# Patient Record
Sex: MALE | Race: WHITE | HISPANIC OR LATINO
[De-identification: names, ages, dates, MRNs, and addresses within clinical notes are randomized per-mention and may not be internally consistent; named-entity substitution may affect disease eponyms.]

---

## 2017-01-09 ENCOUNTER — APPOINTMENT (OUTPATIENT)
Age: 62
End: 2017-01-09

## 2017-01-09 VITALS
HEART RATE: 88 BPM | DIASTOLIC BLOOD PRESSURE: 90 MMHG | HEIGHT: 66 IN | TEMPERATURE: 98.1 F | SYSTOLIC BLOOD PRESSURE: 174 MMHG | RESPIRATION RATE: 18 BRPM | BODY MASS INDEX: 30.37 KG/M2 | WEIGHT: 189 LBS

## 2017-04-09 ENCOUNTER — FORM ENCOUNTER (OUTPATIENT)
Age: 62
End: 2017-04-09

## 2017-04-10 ENCOUNTER — OUTPATIENT (OUTPATIENT)
Dept: OUTPATIENT SERVICES | Facility: HOSPITAL | Age: 62
LOS: 1 days | End: 2017-04-10
Payer: COMMERCIAL

## 2017-04-10 ENCOUNTER — APPOINTMENT (OUTPATIENT)
Dept: ULTRASOUND IMAGING | Facility: CLINIC | Age: 62
End: 2017-04-10

## 2017-04-10 DIAGNOSIS — Z98.89 OTHER SPECIFIED POSTPROCEDURAL STATES: Chronic | ICD-10-CM

## 2017-04-10 DIAGNOSIS — B18.2 CHRONIC VIRAL HEPATITIS C: ICD-10-CM

## 2017-04-10 PROCEDURE — 76700 US EXAM ABDOM COMPLETE: CPT

## 2017-05-11 ENCOUNTER — APPOINTMENT (OUTPATIENT)
Age: 62
End: 2017-05-11

## 2017-05-11 VITALS
RESPIRATION RATE: 17 BRPM | SYSTOLIC BLOOD PRESSURE: 150 MMHG | HEART RATE: 80 BPM | WEIGHT: 190 LBS | BODY MASS INDEX: 30.53 KG/M2 | DIASTOLIC BLOOD PRESSURE: 82 MMHG | TEMPERATURE: 97.7 F | HEIGHT: 66 IN

## 2017-11-16 ENCOUNTER — FORM ENCOUNTER (OUTPATIENT)
Age: 62
End: 2017-11-16

## 2017-11-17 ENCOUNTER — APPOINTMENT (OUTPATIENT)
Dept: ULTRASOUND IMAGING | Facility: CLINIC | Age: 62
End: 2017-11-17
Payer: COMMERCIAL

## 2017-11-17 ENCOUNTER — OUTPATIENT (OUTPATIENT)
Dept: OUTPATIENT SERVICES | Facility: HOSPITAL | Age: 62
LOS: 1 days | End: 2017-11-17
Payer: COMMERCIAL

## 2017-11-17 DIAGNOSIS — Z98.89 OTHER SPECIFIED POSTPROCEDURAL STATES: Chronic | ICD-10-CM

## 2017-11-17 DIAGNOSIS — B18.2 CHRONIC VIRAL HEPATITIS C: ICD-10-CM

## 2017-11-17 PROCEDURE — 76700 US EXAM ABDOM COMPLETE: CPT | Mod: 26

## 2017-11-17 PROCEDURE — 76700 US EXAM ABDOM COMPLETE: CPT

## 2017-12-07 ENCOUNTER — APPOINTMENT (OUTPATIENT)
Age: 62
End: 2017-12-07
Payer: COMMERCIAL

## 2017-12-07 LAB
IRON SATN MFR SERPL: 31 %
IRON SERPL-MCNC: 75 UG/DL
TIBC SERPL-MCNC: 243 UG/DL
UIBC SERPL-MCNC: 168 UG/DL

## 2017-12-07 PROCEDURE — 99214 OFFICE O/P EST MOD 30 MIN: CPT

## 2017-12-08 LAB — FERRITIN SERPL-MCNC: 156 NG/ML

## 2017-12-12 LAB — HFE GENE MUT ANL BLD/T: NORMAL

## 2018-01-10 ENCOUNTER — APPOINTMENT (OUTPATIENT)
Age: 63
End: 2018-01-10
Payer: COMMERCIAL

## 2018-01-10 PROCEDURE — 91200 LIVER ELASTOGRAPHY: CPT

## 2018-01-12 LAB
BASOPHILS # BLD AUTO: 0.03 K/UL
BASOPHILS NFR BLD AUTO: 0.4 %
EOSINOPHIL # BLD AUTO: 0.46 K/UL
EOSINOPHIL NFR BLD AUTO: 6.8 %
HCT VFR BLD CALC: 42.9 %
HGB BLD-MCNC: 14.8 G/DL
IMM GRANULOCYTES NFR BLD AUTO: 0.1 %
LYMPHOCYTES # BLD AUTO: 1.51 K/UL
LYMPHOCYTES NFR BLD AUTO: 22.4 %
MAN DIFF?: NORMAL
MCHC RBC-ENTMCNC: 30.5 PG
MCHC RBC-ENTMCNC: 34.5 GM/DL
MCV RBC AUTO: 88.5 FL
MONOCYTES # BLD AUTO: 1.16 K/UL
MONOCYTES NFR BLD AUTO: 17.2 %
NEUTROPHILS # BLD AUTO: 3.57 K/UL
NEUTROPHILS NFR BLD AUTO: 53.1 %
PLATELET # BLD AUTO: 257 K/UL
RBC # BLD: 4.85 M/UL
RBC # FLD: 13.6 %
WBC # FLD AUTO: 6.74 K/UL

## 2018-01-16 LAB
ALBUMIN SERPL ELPH-MCNC: 4.2 G/DL
ALP BLD-CCNC: 71 U/L
ALT SERPL-CCNC: 14 U/L
ANION GAP SERPL CALC-SCNC: 22 MMOL/L
AST SERPL-CCNC: 23 U/L
BILIRUB SERPL-MCNC: 0.9 MG/DL
BUN SERPL-MCNC: 18 MG/DL
CALCIUM SERPL-MCNC: 10.1 MG/DL
CHLORIDE SERPL-SCNC: 103 MMOL/L
CO2 SERPL-SCNC: 19 MMOL/L
CREAT SERPL-MCNC: 1.21 MG/DL
GLUCOSE SERPL-MCNC: 199 MG/DL
INR PPP: 1.16 RATIO
POTASSIUM SERPL-SCNC: 3.8 MMOL/L
PROT SERPL-MCNC: 8.1 G/DL
PT BLD: 13.1 SEC
SODIUM SERPL-SCNC: 144 MMOL/L

## 2018-01-22 ENCOUNTER — RESULT REVIEW (OUTPATIENT)
Age: 63
End: 2018-01-22

## 2018-01-22 ENCOUNTER — OUTPATIENT (OUTPATIENT)
Dept: OUTPATIENT SERVICES | Facility: HOSPITAL | Age: 63
LOS: 1 days | End: 2018-01-22
Payer: COMMERCIAL

## 2018-01-22 ENCOUNTER — APPOINTMENT (OUTPATIENT)
Dept: ULTRASOUND IMAGING | Facility: IMAGING CENTER | Age: 63
End: 2018-01-22
Payer: COMMERCIAL

## 2018-01-22 DIAGNOSIS — Z98.89 OTHER SPECIFIED POSTPROCEDURAL STATES: Chronic | ICD-10-CM

## 2018-01-22 DIAGNOSIS — E83.119 HEMOCHROMATOSIS, UNSPECIFIED: ICD-10-CM

## 2018-01-22 DIAGNOSIS — K74.60 UNSPECIFIED CIRRHOSIS OF LIVER: ICD-10-CM

## 2018-01-22 DIAGNOSIS — K76.9 LIVER DISEASE, UNSPECIFIED: ICD-10-CM

## 2018-01-22 PROCEDURE — 47000 NEEDLE BIOPSY OF LIVER PERQ: CPT

## 2018-01-22 PROCEDURE — 76942 ECHO GUIDE FOR BIOPSY: CPT | Mod: 26

## 2018-01-22 PROCEDURE — 88313 SPECIAL STAINS GROUP 2: CPT

## 2018-01-22 PROCEDURE — 76942 ECHO GUIDE FOR BIOPSY: CPT

## 2018-01-22 PROCEDURE — 88307 TISSUE EXAM BY PATHOLOGIST: CPT

## 2018-01-22 PROCEDURE — 88313 SPECIAL STAINS GROUP 2: CPT | Mod: 26

## 2018-01-22 PROCEDURE — 88307 TISSUE EXAM BY PATHOLOGIST: CPT | Mod: 26

## 2018-03-15 ENCOUNTER — FORM ENCOUNTER (OUTPATIENT)
Age: 63
End: 2018-03-15

## 2018-03-16 ENCOUNTER — APPOINTMENT (OUTPATIENT)
Dept: ULTRASOUND IMAGING | Facility: CLINIC | Age: 63
End: 2018-03-16
Payer: COMMERCIAL

## 2018-03-16 ENCOUNTER — OUTPATIENT (OUTPATIENT)
Dept: OUTPATIENT SERVICES | Facility: HOSPITAL | Age: 63
LOS: 1 days | End: 2018-03-16
Payer: COMMERCIAL

## 2018-03-16 DIAGNOSIS — Z98.89 OTHER SPECIFIED POSTPROCEDURAL STATES: Chronic | ICD-10-CM

## 2018-03-16 DIAGNOSIS — K76.9 LIVER DISEASE, UNSPECIFIED: ICD-10-CM

## 2018-03-16 DIAGNOSIS — K74.60 UNSPECIFIED CIRRHOSIS OF LIVER: ICD-10-CM

## 2018-03-16 PROCEDURE — 76700 US EXAM ABDOM COMPLETE: CPT

## 2018-03-16 PROCEDURE — 76700 US EXAM ABDOM COMPLETE: CPT | Mod: 26

## 2018-06-14 ENCOUNTER — APPOINTMENT (OUTPATIENT)
Dept: HEPATOLOGY | Facility: CLINIC | Age: 63
End: 2018-06-14
Payer: COMMERCIAL

## 2018-06-14 VITALS
WEIGHT: 160 LBS | OXYGEN SATURATION: 99 % | RESPIRATION RATE: 17 BRPM | BODY MASS INDEX: 25.71 KG/M2 | HEIGHT: 66 IN | DIASTOLIC BLOOD PRESSURE: 92 MMHG | HEART RATE: 77 BPM | SYSTOLIC BLOOD PRESSURE: 184 MMHG | TEMPERATURE: 98.3 F

## 2018-06-14 DIAGNOSIS — Z86.39 PERSONAL HISTORY OF OTHER ENDOCRINE, NUTRITIONAL AND METABOLIC DISEASE: ICD-10-CM

## 2018-06-14 PROCEDURE — 99214 OFFICE O/P EST MOD 30 MIN: CPT

## 2018-06-14 RX ORDER — METOPROLOL SUCCINATE 100 MG/1
100 TABLET, EXTENDED RELEASE ORAL
Qty: 90 | Refills: 0 | Status: ACTIVE | COMMUNITY
Start: 2017-12-05

## 2018-06-14 RX ORDER — BLOOD SUGAR DIAGNOSTIC
STRIP MISCELLANEOUS
Qty: 300 | Refills: 0 | Status: ACTIVE | COMMUNITY
Start: 2018-04-01

## 2018-06-14 RX ORDER — LISINOPRIL 2.5 MG/1
2.5 TABLET ORAL
Qty: 30 | Refills: 0 | Status: DISCONTINUED | COMMUNITY
Start: 2018-06-07 | End: 2018-06-14

## 2018-06-14 RX ORDER — LANCETS 33 GAUGE
EACH MISCELLANEOUS
Qty: 300 | Refills: 0 | Status: ACTIVE | COMMUNITY
Start: 2017-07-07

## 2018-06-14 RX ORDER — HYDROCODONE BITARTRATE AND ACETAMINOPHEN 10; 325 MG/1; MG/1
10-325 TABLET ORAL
Qty: 180 | Refills: 0 | Status: ACTIVE | COMMUNITY
Start: 2018-01-11

## 2018-06-14 RX ORDER — AMOXICILLIN 500 MG/1
500 CAPSULE ORAL
Qty: 21 | Refills: 0 | Status: DISCONTINUED | COMMUNITY
Start: 2018-04-06 | End: 2018-06-14

## 2018-11-28 ENCOUNTER — FORM ENCOUNTER (OUTPATIENT)
Age: 63
End: 2018-11-28

## 2018-11-29 ENCOUNTER — OUTPATIENT (OUTPATIENT)
Dept: OUTPATIENT SERVICES | Facility: HOSPITAL | Age: 63
LOS: 1 days | End: 2018-11-29
Payer: COMMERCIAL

## 2018-11-29 ENCOUNTER — APPOINTMENT (OUTPATIENT)
Dept: RADIOLOGY | Facility: CLINIC | Age: 63
End: 2018-11-29
Payer: COMMERCIAL

## 2018-11-29 ENCOUNTER — APPOINTMENT (OUTPATIENT)
Dept: MRI IMAGING | Facility: CLINIC | Age: 63
End: 2018-11-29
Payer: COMMERCIAL

## 2018-11-29 DIAGNOSIS — Z98.89 OTHER SPECIFIED POSTPROCEDURAL STATES: Chronic | ICD-10-CM

## 2018-11-29 DIAGNOSIS — K74.60 UNSPECIFIED CIRRHOSIS OF LIVER: ICD-10-CM

## 2018-11-29 PROCEDURE — 73030 X-RAY EXAM OF SHOULDER: CPT

## 2018-11-29 PROCEDURE — 72156 MRI NECK SPINE W/O & W/DYE: CPT | Mod: 26

## 2018-11-29 PROCEDURE — 74183 MRI ABD W/O CNTR FLWD CNTR: CPT | Mod: 26

## 2018-11-29 PROCEDURE — 73221 MRI JOINT UPR EXTREM W/O DYE: CPT | Mod: 26,RT

## 2018-11-29 PROCEDURE — 73030 X-RAY EXAM OF SHOULDER: CPT | Mod: 26,RT

## 2018-11-29 PROCEDURE — A9585: CPT

## 2018-11-29 PROCEDURE — 72156 MRI NECK SPINE W/O & W/DYE: CPT

## 2018-11-29 PROCEDURE — 74183 MRI ABD W/O CNTR FLWD CNTR: CPT

## 2018-11-29 PROCEDURE — 73221 MRI JOINT UPR EXTREM W/O DYE: CPT

## 2019-02-14 ENCOUNTER — APPOINTMENT (OUTPATIENT)
Dept: HEPATOLOGY | Facility: CLINIC | Age: 64
End: 2019-02-14
Payer: COMMERCIAL

## 2019-02-14 VITALS
WEIGHT: 180 LBS | HEIGHT: 65 IN | SYSTOLIC BLOOD PRESSURE: 172 MMHG | DIASTOLIC BLOOD PRESSURE: 87 MMHG | TEMPERATURE: 97.9 F | BODY MASS INDEX: 29.99 KG/M2 | RESPIRATION RATE: 16 BRPM | HEART RATE: 94 BPM

## 2019-02-14 PROCEDURE — 99214 OFFICE O/P EST MOD 30 MIN: CPT

## 2019-06-25 ENCOUNTER — FORM ENCOUNTER (OUTPATIENT)
Age: 64
End: 2019-06-25

## 2019-06-26 ENCOUNTER — APPOINTMENT (OUTPATIENT)
Dept: ULTRASOUND IMAGING | Facility: CLINIC | Age: 64
End: 2019-06-26
Payer: COMMERCIAL

## 2019-06-26 ENCOUNTER — OUTPATIENT (OUTPATIENT)
Dept: OUTPATIENT SERVICES | Facility: HOSPITAL | Age: 64
LOS: 1 days | End: 2019-06-26

## 2019-06-26 DIAGNOSIS — K74.60 UNSPECIFIED CIRRHOSIS OF LIVER: ICD-10-CM

## 2019-06-26 DIAGNOSIS — Z98.89 OTHER SPECIFIED POSTPROCEDURAL STATES: Chronic | ICD-10-CM

## 2019-06-26 PROCEDURE — 76700 US EXAM ABDOM COMPLETE: CPT | Mod: 26

## 2019-07-05 ENCOUNTER — APPOINTMENT (OUTPATIENT)
Dept: CARDIOLOGY | Facility: CLINIC | Age: 64
End: 2019-07-05
Payer: COMMERCIAL

## 2019-07-05 ENCOUNTER — OTHER (OUTPATIENT)
Age: 64
End: 2019-07-05

## 2019-07-05 ENCOUNTER — NON-APPOINTMENT (OUTPATIENT)
Age: 64
End: 2019-07-05

## 2019-07-05 VITALS
HEART RATE: 80 BPM | WEIGHT: 187 LBS | DIASTOLIC BLOOD PRESSURE: 72 MMHG | HEIGHT: 66 IN | OXYGEN SATURATION: 99 % | SYSTOLIC BLOOD PRESSURE: 160 MMHG | BODY MASS INDEX: 30.05 KG/M2

## 2019-07-05 PROCEDURE — 99244 OFF/OP CNSLTJ NEW/EST MOD 40: CPT

## 2019-07-05 PROCEDURE — 93000 ELECTROCARDIOGRAM COMPLETE: CPT

## 2019-08-15 ENCOUNTER — APPOINTMENT (OUTPATIENT)
Dept: HEPATOLOGY | Facility: CLINIC | Age: 64
End: 2019-08-15

## 2019-08-19 ENCOUNTER — APPOINTMENT (OUTPATIENT)
Dept: CARDIOLOGY | Facility: CLINIC | Age: 64
End: 2019-08-19
Payer: COMMERCIAL

## 2019-08-19 PROCEDURE — 93306 TTE W/DOPPLER COMPLETE: CPT

## 2019-08-21 ENCOUNTER — APPOINTMENT (OUTPATIENT)
Dept: HEPATOLOGY | Facility: CLINIC | Age: 64
End: 2019-08-21
Payer: COMMERCIAL

## 2019-08-21 VITALS
HEIGHT: 66 IN | SYSTOLIC BLOOD PRESSURE: 144 MMHG | TEMPERATURE: 98.1 F | RESPIRATION RATE: 16 BRPM | DIASTOLIC BLOOD PRESSURE: 72 MMHG | HEART RATE: 81 BPM

## 2019-08-21 VITALS — BODY MASS INDEX: 29.7 KG/M2 | WEIGHT: 184 LBS

## 2019-08-21 PROCEDURE — 99214 OFFICE O/P EST MOD 30 MIN: CPT

## 2019-08-21 NOTE — HISTORY OF PRESENT ILLNESS
[de-identified] : Mr. Palmer comes in today for followup visit. He has cirrhosis secondary to hepatitis C. He was treated for hepatitis C  in 2013 and is a sustained virologic responder.  He reports feeling well. He has no abdominal pain, nausea, vomiting, jaundice or pruritus. \par \par Liver biopsy in 2006 shows Stage 3-4 disease. He reports that he has hemochromatosis and was diagnosed in 2005 and has been seeing his gastroenterologist Dr. Nunn for periodic phlebotomies as needed. He had a repeat liver biopsy performed January 22, 2018 revealed F3/4 disease without steatosis and no iron. HII is 0.1\par \par Fibroscan test done 1/21/15 was consistent with F3\par Fibroscan test from 1/10/18 showed F2, S1\par \par Blood tests from 7/9/19 ALT 21, AST 20, Tbil 0.7, Albumin 4.7, CR 1.02, plts 312,000\par Abdo US from 6/26/19 showed no hepatic lesions, no ascites\par \par Blood test June 20, 2018 ALT 27, AST 30, platelet count 291,000. MRI November 29, 2018 shows 0.6 cm lesion in segment to unchanged since 2009 and possibility of early cirrhosis\par An abdominal sonogram March 16, 2018 shows no lesions in the liver. The common bile duct measures 9 mm\par \par An abdominal sonogram November 17, 2017 without lesions in the liver. Blood test November 25, 2017 ALT 18, AST 19, alkaline phosphatase 68, creatinine 1.1, CBC with platelets unremarkable, alpha-fetoprotein 0.8\par \par Abdominal sonogram of April 10, 2017 showed no lesions in the liver. Blood test May 1, 2017 ALT 16, AST 21, CBC with platelets unremarkable, INR 1.1, HCV RNA not detected\par \par An abdominal sonogram November 28, 2016 shows no lesions in the liver\par \par Sonogram January 11, 2016 showed no lesions in the liver. Blood tests January 6, 2016 platelet count 271,000, INR 1.14, iron saturation was 38%, ALT 22, creatinine 1.15, ferritin 124, alpha-fetoprotein 1.6, HCV RNA not detected\par \par Abdominal MRI from January 14, 2015 shows indeterminate finding in the left hepatic lobe measuring 0.3 cm with heterogeneous micronodular enhancement pattern in the arterial phase. Repeat MRI on April 6, 2015 shows stable subcentimeter lesion and is indeterminate.\par \par  He had a CAT scan done by Dr. Nunn on June 27, 2013 revealing no hepatic lesions. Incidental finding of small pulmonary nodule in left lower lobe. \par \par blood tests from April 30, 2015 ALT 27, AST 24, AFP 0.8, HCV RNA not detected.\par \par  blood tests from January 5, 2015 platelets 248,000, AFP 0.8, ALT 27, AST 26.\par \par Blood tests from January 2, 2014 serum glucose 399, platelets 229,000, ALT 23, AST 14, total bilirubin 0.5, HCV RNA not detected.\par \par abdominal sonogram from March 19, 2014 shows no hepatic lesions, slight dilatation of the common bile duct.\par \par

## 2019-08-21 NOTE — ASSESSMENT
[FreeTextEntry1] : 64-year-old male with  history of hepatitis C S/P hepatitis C treatment on 1/27/13. He is a sustained virologic responder. Liver biopsy in 2018 shows Stage 3-4 disease\par \par I explained the meaning of sustained virological response. I explained the that hepatitis C antibody will be present for life. I explained that the patient will not be able to donate blood because of the positive antibody. I have explained that the antibody is not protective and that hepatitis C infection can be acquired again if  exposed. I reviewed the risk factors for acquiring hepatitis C. \par \par I discussed the meaning of cirrhosis with the patient. I reviewed the natural history of the disease. I explained the risks for the development of esophageal varices with and without bleeding, hepatic encephalopathy, ascites, hepatocellular carcinoma, and liver failure. I have explained that disease can progress to the point of requiring an evaluation for liver transplantation. I have explained the need for imaging every 6 months to screen for liver cancer.\par \par  I would like to see him back in 6 months with repeat laboratory tests and sonogram. He will repeat fibroscan test at next follow up visit. \par \par

## 2019-08-21 NOTE — CONSULT LETTER
[Courtesy Letter:] : I had the pleasure of seeing your patient, [unfilled], in my office today. [Dear  ___] : Dear  [unfilled], [Please see my note below.] : Please see my note below. [Consult Closing:] : Thank you very much for allowing me to participate in the care of this patient.  If you have any questions, please do not hesitate to contact me. [Sincerely,] : Sincerely, [Chief, Division of Hepatology] : Chief, Division of Hepatology [Karlo Nelson MD, FACP, ROBERTA, GUILLE, ANAHY] : Karlo Nelson MD, FACP, ROBERTA, GUILLE, ANAHY [[facility]] : [facility] [Professor of Medicine] : Professor of Medicine

## 2019-08-23 ENCOUNTER — APPOINTMENT (OUTPATIENT)
Dept: CARDIOLOGY | Facility: CLINIC | Age: 64
End: 2019-08-23
Payer: COMMERCIAL

## 2019-08-23 VITALS
DIASTOLIC BLOOD PRESSURE: 80 MMHG | WEIGHT: 184 LBS | HEIGHT: 66 IN | BODY MASS INDEX: 29.57 KG/M2 | HEART RATE: 81 BPM | SYSTOLIC BLOOD PRESSURE: 140 MMHG | OXYGEN SATURATION: 97 %

## 2019-08-23 PROCEDURE — 99214 OFFICE O/P EST MOD 30 MIN: CPT

## 2019-09-09 ENCOUNTER — APPOINTMENT (OUTPATIENT)
Dept: ULTRASOUND IMAGING | Facility: CLINIC | Age: 64
End: 2019-09-09
Payer: COMMERCIAL

## 2019-09-09 PROCEDURE — 76536 US EXAM OF HEAD AND NECK: CPT

## 2019-09-10 ENCOUNTER — OUTPATIENT (OUTPATIENT)
Dept: OUTPATIENT SERVICES | Facility: HOSPITAL | Age: 64
LOS: 1 days | End: 2019-09-10
Payer: COMMERCIAL

## 2019-09-10 DIAGNOSIS — Z98.89 OTHER SPECIFIED POSTPROCEDURAL STATES: Chronic | ICD-10-CM

## 2019-09-10 PROCEDURE — 93320 DOPPLER ECHO COMPLETE: CPT | Mod: 26

## 2019-09-10 PROCEDURE — 93312 ECHO TRANSESOPHAGEAL: CPT | Mod: 26

## 2019-09-23 ENCOUNTER — APPOINTMENT (OUTPATIENT)
Dept: CARDIOLOGY | Facility: CLINIC | Age: 64
End: 2019-09-23
Payer: COMMERCIAL

## 2019-09-23 VITALS
WEIGHT: 188 LBS | HEIGHT: 66 IN | SYSTOLIC BLOOD PRESSURE: 150 MMHG | BODY MASS INDEX: 30.22 KG/M2 | HEART RATE: 76 BPM | OXYGEN SATURATION: 97 % | DIASTOLIC BLOOD PRESSURE: 86 MMHG

## 2019-09-23 PROCEDURE — 93000 ELECTROCARDIOGRAM COMPLETE: CPT

## 2019-09-23 PROCEDURE — 99214 OFFICE O/P EST MOD 30 MIN: CPT | Mod: 25

## 2019-10-11 ENCOUNTER — APPOINTMENT (OUTPATIENT)
Dept: MRI IMAGING | Facility: CLINIC | Age: 64
End: 2019-10-11
Payer: COMMERCIAL

## 2019-10-11 PROCEDURE — 72156 MRI NECK SPINE W/O & W/DYE: CPT

## 2019-10-11 PROCEDURE — A9585A: CUSTOM

## 2019-10-11 PROCEDURE — A9585: CPT | Mod: JW

## 2019-11-18 ENCOUNTER — APPOINTMENT (OUTPATIENT)
Dept: MRI IMAGING | Facility: CLINIC | Age: 64
End: 2019-11-18
Payer: COMMERCIAL

## 2019-11-18 PROCEDURE — 73221 MRI JOINT UPR EXTREM W/O DYE: CPT | Mod: RT

## 2019-11-18 PROCEDURE — 72141 MRI NECK SPINE W/O DYE: CPT

## 2019-12-20 ENCOUNTER — APPOINTMENT (OUTPATIENT)
Dept: CARDIOLOGY | Facility: CLINIC | Age: 64
End: 2019-12-20
Payer: COMMERCIAL

## 2019-12-20 PROCEDURE — 93306 TTE W/DOPPLER COMPLETE: CPT

## 2019-12-23 ENCOUNTER — APPOINTMENT (OUTPATIENT)
Dept: CARDIOLOGY | Facility: CLINIC | Age: 64
End: 2019-12-23
Payer: COMMERCIAL

## 2019-12-23 VITALS
BODY MASS INDEX: 30.22 KG/M2 | HEART RATE: 85 BPM | WEIGHT: 188 LBS | HEIGHT: 66 IN | DIASTOLIC BLOOD PRESSURE: 80 MMHG | SYSTOLIC BLOOD PRESSURE: 138 MMHG | OXYGEN SATURATION: 96 %

## 2019-12-23 PROCEDURE — 99214 OFFICE O/P EST MOD 30 MIN: CPT

## 2020-01-21 ENCOUNTER — APPOINTMENT (OUTPATIENT)
Dept: ULTRASOUND IMAGING | Facility: CLINIC | Age: 65
End: 2020-01-21
Payer: COMMERCIAL

## 2020-01-21 PROCEDURE — 76700 US EXAM ABDOM COMPLETE: CPT

## 2020-01-24 ENCOUNTER — OTHER (OUTPATIENT)
Age: 65
End: 2020-01-24

## 2020-02-12 ENCOUNTER — APPOINTMENT (OUTPATIENT)
Dept: HEPATOLOGY | Facility: CLINIC | Age: 65
End: 2020-02-12

## 2020-02-27 NOTE — PHYSICAL EXAM
abnormal lab result [General Appearance - In No Acute Distress] : in no acute distress [Sclera] : the sclera and conjunctiva were normal [General Appearance - Alert] : alert [Neck Appearance] : the appearance of the neck was normal [Neck Cervical Mass (___cm)] : no neck mass was observed [Jugular Venous Distention Increased] : there was no jugular-venous distention [Thyroid Nodule] : there were no palpable thyroid nodules [Thyroid Diffuse Enlargement] : the thyroid was not enlarged [Heart Rate And Rhythm] : heart rate was normal and rhythm regular [Auscultation Breath Sounds / Voice Sounds] : lungs were clear to auscultation bilaterally [Heart Sounds] : normal S1 and S2 [Full Pulse] : the pedal pulses are present [Edema] : there was no peripheral edema [Bowel Sounds] : normal bowel sounds [Abdomen Soft] : soft [Abdomen Tenderness] : non-tender [Abdomen Mass (___ Cm)] : no abdominal mass palpated [No CVA Tenderness] : no ~M costovertebral angle tenderness [] : no hepato-splenomegaly [No Spinal Tenderness] : no spinal tenderness [No Focal Deficits] : no focal deficits [Oriented To Time, Place, And Person] : oriented to person, place, and time [Affect] : the affect was normal [Scleral Icterus] : No Scleral Icterus [Hepatojugular Reflux] : patient did not have a sustained hepatojugular reflux [Spider Angioma] : No spider angioma(s) were observed [Abdominal Bruit] : no abdominal bruit [Abdominal  Ascites] : no ascites [Asterixis] : no asterixis observed [Splenomegaly] : no splenomegaly [Jaundice] : No jaundice [Palmar Erythema] : no Palmar Erythema

## 2020-03-10 ENCOUNTER — APPOINTMENT (OUTPATIENT)
Dept: MRI IMAGING | Facility: CLINIC | Age: 65
End: 2020-03-10
Payer: COMMERCIAL

## 2020-03-10 PROCEDURE — A9585A: CUSTOM

## 2020-03-10 PROCEDURE — 74183 MRI ABD W/O CNTR FLWD CNTR: CPT

## 2020-03-10 PROCEDURE — A9585: CPT | Mod: JW

## 2020-03-25 ENCOUNTER — APPOINTMENT (OUTPATIENT)
Dept: GASTROENTEROLOGY | Facility: CLINIC | Age: 65
End: 2020-03-25
Payer: MEDICARE

## 2020-03-25 ENCOUNTER — APPOINTMENT (OUTPATIENT)
Dept: HEPATOLOGY | Facility: CLINIC | Age: 65
End: 2020-03-25

## 2020-03-25 VITALS
WEIGHT: 190 LBS | HEIGHT: 66 IN | BODY MASS INDEX: 30.53 KG/M2 | DIASTOLIC BLOOD PRESSURE: 93 MMHG | HEART RATE: 93 BPM | SYSTOLIC BLOOD PRESSURE: 173 MMHG | RESPIRATION RATE: 16 BRPM | OXYGEN SATURATION: 98 % | TEMPERATURE: 98.3 F

## 2020-03-25 DIAGNOSIS — K80.50 CALCULUS OF BILE DUCT W/OUT CHOLANGITIS OR CHOLECYSTITIS W/OUT OBSTRUCTION: ICD-10-CM

## 2020-03-25 PROCEDURE — 99203 OFFICE O/P NEW LOW 30 MIN: CPT

## 2020-03-25 NOTE — PHYSICAL EXAM
[General Appearance - Alert] : alert [General Appearance - In No Acute Distress] : in no acute distress [Sclera] : the sclera and conjunctiva were normal [PERRL With Normal Accommodation] : pupils were equal in size, round, and reactive to light [Extraocular Movements] : extraocular movements were intact [Outer Ear] : the ears and nose were normal in appearance [Oropharynx] : the oropharynx was normal [Neck Appearance] : the appearance of the neck was normal [Neck Cervical Mass (___cm)] : no neck mass was observed [Jugular Venous Distention Increased] : there was no jugular-venous distention [Thyroid Diffuse Enlargement] : the thyroid was not enlarged [Thyroid Nodule] : there were no palpable thyroid nodules [Auscultation Breath Sounds / Voice Sounds] : lungs were clear to auscultation bilaterally [Heart Sounds] : normal S1 and S2 [Bowel Sounds] : normal bowel sounds [Abdomen Soft] : soft [Abdomen Tenderness] : non-tender [] : no hepato-splenomegaly [Abdomen Mass (___ Cm)] : no abdominal mass palpated [No CVA Tenderness] : no ~M costovertebral angle tenderness [No Spinal Tenderness] : no spinal tenderness [Oriented To Time, Place, And Person] : oriented to person, place, and time [Impaired Insight] : insight and judgment were intact [Affect] : the affect was normal [FreeTextEntry1] : obese

## 2020-03-25 NOTE — CONSULT LETTER
[Dear  ___] : Dear  [unfilled], [Consult Letter:] : I had the pleasure of evaluating your patient, [unfilled]. [Please see my note below.] : Please see my note below. [Consult Closing:] : Thank you very much for allowing me to participate in the care of this patient.  If you have any questions, please do not hesitate to contact me. [Sincerely,] : Sincerely, [DrEvelyn  ___] : Dr. SUERO [FreeTextEntry3] : Henry Solo MD [DrEvelyn ___] : Dr. SUERO

## 2020-03-25 NOTE — HISTORY OF PRESENT ILLNESS
[de-identified] : This is a 65 year old man with a history of HTN, DM,AS, Hep C cirrhosis who was treated for Hepatitis C and is a sustained virologic responder.  He is a patient of Dr. Nelson and follows up with him regularly. He had a routine ultrasound of the abdomen which showed a dilated CBD to 1 cm. A follow up MRCP showed a distal CBD stone. He has normal liver function tests. He states that he has occasional right upper quadrant pain. It is intermittent and usually mild. It can last hours to days when it occurs but he is not aware of any precipitating factors. He denies fevers, chills, nausea or vomiting currently.  [de-identified] : FINDINGS: \par \par LOWER CHEST: Clear. \par \par LIVER: Posterior nodule of the right lobe again noted. Normal signal. Patent \par hepatic vasculature including portal and hepatic veins. Conventional \par arterial anatomy. No varices. No evidence of hepatocellular carcinoma. \par \par BILE DUCTS: 11 mm common bile duct dilatation with normal distal tapering. 3 \par mm distal CBD calculus. No intrahepatic dilatation. Cystic duct is \par unremarkable. \par \par GALLBLADDER: Normal without stones or thickening. \par SPLEEN: Normal. \par PANCREAS: Normal. \par ADRENALS: Normal. \par KIDNEYS/URETERS: Symmetric nephrograms. No hydronephrosis. \par \par VISUALIZED PORTIONS: \par \par BOWEL: No bowel-related abnormality. \par PERITONEUM: No ascites. \par VESSELS: Normal caliber aorta. \par RETROPERITONEUM/LYMPH NODES: Stable increased signal of the small bowel \par mesentery with associated mild mesenteric lymphadenopathy. \par ABDOMINAL WALL: Normal. \par BONES: No aggressive lesion. \par \par IMPRESSION: \par \par No evidence of hepatocellular carcinoma. \par \par 3 mm distal common bile duct calculus with 11 mm dilatation of the common \par duct.  [de-identified] : FINDINGS: \par \par Liver: Normal echogenicity and echotexture. Smooth surface contour. No \par focal lesion. A it measures 17.2 cm sagittally. The main portal vein is \par patent. \par \par Bile ducts: No intrahepatic biliary ductal dilatation. Common bile duct \par measures 10 mm. \par \par Gallbladder: Within normal limits. \par \par Pancreas: Visualized portions are within normal limits. \par \par Spleen: 9.1 cm. Within normal limits. \par \par Right kidney: 11.7 cm. No hydronephrosis. Normal cortical echogenicity. \par \par Left kidney: 11.5 cm. No hydronephrosis. Normal cortical echogenicity. \par \par Ascites: None. \par \par Aorta and IVC: Visualized portions are within normal limits. \par \par IMPRESSION: \par \par Dilated CBD measuring 1 cm. \par Otherwise unremarkable abdominal ultrasound.

## 2020-03-25 NOTE — ASSESSMENT
[FreeTextEntry1] : This is a 65 year old man with a history of hepatitis C and cirrhosis although his most recent fibroscan shows an improvement in his fibrosis score. He was incidentally found to have a distal CBD stone. I will schedule him for an ERCP. We discussed the risks, benefits and alternatives of the procedure in detail. He will follow up with his cardiologist Dr. Desouza regarding cardiac clearance.

## 2020-03-26 ENCOUNTER — APPOINTMENT (OUTPATIENT)
Dept: CARDIOLOGY | Facility: CLINIC | Age: 65
End: 2020-03-26
Payer: MEDICARE

## 2020-03-26 ENCOUNTER — NON-APPOINTMENT (OUTPATIENT)
Age: 65
End: 2020-03-26

## 2020-03-26 VITALS
OXYGEN SATURATION: 98 % | HEART RATE: 87 BPM | WEIGHT: 190 LBS | HEIGHT: 66 IN | BODY MASS INDEX: 30.53 KG/M2 | DIASTOLIC BLOOD PRESSURE: 86 MMHG | SYSTOLIC BLOOD PRESSURE: 142 MMHG

## 2020-03-26 DIAGNOSIS — Z00.00 ENCOUNTER FOR GENERAL ADULT MEDICAL EXAMINATION W/OUT ABNORMAL FINDINGS: ICD-10-CM

## 2020-03-26 PROCEDURE — 99215 OFFICE O/P EST HI 40 MIN: CPT

## 2020-03-26 PROCEDURE — 93000 ELECTROCARDIOGRAM COMPLETE: CPT

## 2020-03-26 NOTE — REASON FOR VISIT
[Follow-Up - Clinic] : a clinic follow-up of [Aortic Stenosis] : aortic stenosis [Hyperlipidemia] : hyperlipidemia [Hypertension] : hypertension [Medication Management] : Medication management [Family Member] : family member

## 2020-03-26 NOTE — DISCUSSION/SUMMARY
[Patient] : the patient [___ Month(s)] : [unfilled] month(s) [With Me] : with me [Procedure Low Risk] : the procedure risk is low [Patient Low Risk] : the patient is a low surgical risk [Optimized for Surgery] : the patient is optimized for surgery [As per surgery] : as per surgery [Continue] : Continue medications as currently directed [FreeTextEntry1] : 65yr M w/ PMH as above presenting for clearance for ERCP\par Cleared for ERCP. \par \par  He has severe MR but no other echo parameters to warrant surgery at this point.  \par \par 1. Severe MR - asymptomatic, repeat echo in 6 months time to look for change in echo parameters\par 2. HTN - Exforge  mg PO daily, HCTZ 12.5 mg PO daily, toprol 150 mg PO daily \par 3. Continue healthy diet and exercise\par \par

## 2020-03-26 NOTE — PHYSICAL EXAM
[General Appearance - Well Developed] : well developed [Normal Appearance] : normal appearance [Well Groomed] : well groomed [General Appearance - Well Nourished] : well nourished [No Deformities] : no deformities [General Appearance - In No Acute Distress] : no acute distress [Normal Conjunctiva] : the conjunctiva exhibited no abnormalities [Eyelids - No Xanthelasma] : the eyelids demonstrated no xanthelasmas [Normal Oral Mucosa] : normal oral mucosa [No Oral Pallor] : no oral pallor [No Oral Cyanosis] : no oral cyanosis [Normal Jugular Venous A Waves Present] : normal jugular venous A waves present [Normal Jugular Venous V Waves Present] : normal jugular venous V waves present [No Jugular Venous Boo A Waves] : no jugular venous boo A waves [Respiration, Rhythm And Depth] : normal respiratory rhythm and effort [Exaggerated Use Of Accessory Muscles For Inspiration] : no accessory muscle use [Auscultation Breath Sounds / Voice Sounds] : lungs were clear to auscultation bilaterally [Heart Rate And Rhythm] : heart rate and rhythm were normal [Heart Sounds] : normal S1 and S2 [Arterial Pulses Normal] : the arterial pulses were normal [Edema] : no peripheral edema present [Bowel Sounds] : normal bowel sounds [Abdomen Soft] : soft [Abdomen Tenderness] : non-tender [Abdomen Mass (___ Cm)] : no abdominal mass palpated [Abnormal Walk] : normal gait [Gait - Sufficient For Exercise Testing] : the gait was sufficient for exercise testing [Nail Clubbing] : no clubbing of the fingernails [Cyanosis, Localized] : no localized cyanosis [Petechial Hemorrhages (___cm)] : no petechial hemorrhages [Skin Color & Pigmentation] : normal skin color and pigmentation [] : no rash [No Venous Stasis] : no venous stasis [Skin Lesions] : no skin lesions [No Skin Ulcers] : no skin ulcer [No Xanthoma] : no  xanthoma was observed [Oriented To Time, Place, And Person] : oriented to person, place, and time [Affect] : the affect was normal [Mood] : the mood was normal [No Anxiety] : not feeling anxious [FreeTextEntry1] : 2/6 holosystolic murmur at the apex, early peaking

## 2020-03-26 NOTE — HISTORY OF PRESENT ILLNESS
[Preoperative Visit] : for a medical evaluation prior to surgery [Scheduled Procedure ___] : a [unfilled] [Surgeon Name ___] : surgeon: [unfilled] [Good] : Good [Abdominal Pain] : abdominal pain [Diabetes] : diabetes [Cardiovascular Disease] : cardiovascular disease [GI Disease] : gastrointestinal disease [Prior Anesthesia] : Prior anesthesia [Electrocardiogram] : ~T an ECG ~C was performed [Echocardiogram] : ~T an echocardiogram ~C was performed [Fever] : no fever [Chills] : no chills [Fatigue] : no fatigue [Chest Pain] : no chest pain [Cough] : no cough [Dyspnea] : no dyspnea [Dysuria] : no dysuria [Urinary Frequency] : no urinary frequency [Nausea] : no nausea [Vomiting] : no vomiting [Diarrhea] : no diarrhea [Easy Bruising] : no easy bruising [Lower Extremity Swelling] : no lower extremity swelling [Poor Exercise Tolerance] : no poor exercise tolerance [Pulmonary Disease] : no pulmonary disease [Anti-Platelet Agents] : no anti-platelet agents [Nicotine Dependence] : no nicotine dependence [Alcohol Use] : no  alcohol use [Renal Disease] : no renal disease [Sleep Apnea] : no sleep apnea [Thromboembolic Problems] : no thromboembolic problems [Frequent use of NSAIDs] : no use of NSAIDs [Transfusion Reaction] : no transfusion reaction [Impaired Immunity] : no impaired immunity [Steroid Use in Last 6 Months] : no steroid use in the last six months [Frequent Aspirin Use] : no frequent aspirin use [Prev Anesthesia Reaction] : no previous anesthesia reaction [Anesthesia Reaction] : no anesthesia reaction [Sudden Death] : no sudden death [Clotting Disorder] : no clotting disorder [Bleeding Disorder] : no bleeding disorder [FreeTextEntry1] : 65M was seen on 03/26/20 for clearance for ERCP.\par  w/ PMH of HTN, HLD, DM on insulin, HCV s/p treatment, AS with long-term history of murmur  He is active at the gym and is able to use the elipitical and bike for up to an hour at a time without chest pain, SOB or palpitations.  He has previously had a non-invasive workup through [facility] but has switched insurance.  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  LVIDs < 4.0 and EF was 60% by visual estimation. QUINCY revealed severe eccentric MR with a partially flail PMVL. He continues to be asymptomatic and exercise regularly at the gym.\par \par Repeat TTE showed similar EF, MR and pulmonary pressures\par \par he has no chest pain\par He has no shortness of breath\par He has no palpitations\par He has no syncope\par He is neurologically intact\par He has no edema\par He has no GI symptoms\par

## 2020-04-01 ENCOUNTER — APPOINTMENT (OUTPATIENT)
Dept: GASTROENTEROLOGY | Facility: HOSPITAL | Age: 65
End: 2020-04-01

## 2020-04-02 ENCOUNTER — APPOINTMENT (OUTPATIENT)
Dept: DISASTER EMERGENCY | Facility: CLINIC | Age: 65
End: 2020-04-02
Payer: MEDICARE

## 2020-04-02 VITALS — TEMPERATURE: 98.4 F

## 2020-04-02 DIAGNOSIS — R68.89 OTHER GENERAL SYMPTOMS AND SIGNS: ICD-10-CM

## 2020-04-02 DIAGNOSIS — Z20.828 CONTACT WITH AND (SUSPECTED) EXPOSURE TO OTHER VIRAL COMMUNICABLE DISEASES: ICD-10-CM

## 2020-04-02 PROCEDURE — 99202 OFFICE O/P NEW SF 15 MIN: CPT | Mod: CS

## 2020-04-02 NOTE — HISTORY OF PRESENT ILLNESS
[] : no diaphoresis [None Known] : none known [Age >= 60 years] : age >= 60 years [None] : none [Speaks in full sentences] : speaks in full sentences [Grossly normal, interacts, not tired or weak] : grossly normal, interacts, not tired or weak [COVID-19 testing ordered and done] : COVID-19 testing ordered and done [FreeTextEntry1] : LYDIA is a 65 year male who is here for COVID 19 testing.\par Fever broke yesterday afternoon. He is supposed to have gal blader surgery. needs testing to access surgery [TextBox_[id number]] : sinus congestion \par no diarrhea

## 2020-04-07 LAB — SARS-COV-2 N GENE NPH QL NAA+PROBE: NOT DETECTED

## 2020-04-20 ENCOUNTER — OUTPATIENT (OUTPATIENT)
Dept: OUTPATIENT SERVICES | Facility: HOSPITAL | Age: 65
LOS: 1 days | End: 2020-04-20

## 2020-04-20 DIAGNOSIS — Z98.89 OTHER SPECIFIED POSTPROCEDURAL STATES: Chronic | ICD-10-CM

## 2020-04-23 ENCOUNTER — TRANSCRIPTION ENCOUNTER (OUTPATIENT)
Age: 65
End: 2020-04-23

## 2020-04-24 ENCOUNTER — RESULT REVIEW (OUTPATIENT)
Age: 65
End: 2020-04-24

## 2020-04-24 ENCOUNTER — INPATIENT (INPATIENT)
Facility: HOSPITAL | Age: 65
LOS: 1 days | Discharge: HOME | DRG: 393 | End: 2020-04-26
Attending: HOSPITALIST
Payer: MEDICARE

## 2020-04-24 ENCOUNTER — APPOINTMENT (OUTPATIENT)
Dept: GASTROENTEROLOGY | Facility: HOSPITAL | Age: 65
End: 2020-04-24

## 2020-04-24 ENCOUNTER — OUTPATIENT (OUTPATIENT)
Dept: OUTPATIENT SERVICES | Facility: HOSPITAL | Age: 65
LOS: 1 days | End: 2020-04-24
Payer: MEDICARE

## 2020-04-24 VITALS
HEIGHT: 66 IN | RESPIRATION RATE: 20 BRPM | TEMPERATURE: 98 F | OXYGEN SATURATION: 96 % | SYSTOLIC BLOOD PRESSURE: 150 MMHG | DIASTOLIC BLOOD PRESSURE: 76 MMHG | HEART RATE: 83 BPM | WEIGHT: 182.1 LBS

## 2020-04-24 DIAGNOSIS — Z98.89 OTHER SPECIFIED POSTPROCEDURAL STATES: Chronic | ICD-10-CM

## 2020-04-24 DIAGNOSIS — K80.50 CALCULUS OF BILE DUCT WITHOUT CHOLANGITIS OR CHOLECYSTITIS WITHOUT OBSTRUCTION: ICD-10-CM

## 2020-04-24 LAB
ALBUMIN SERPL ELPH-MCNC: 4.5 G/DL — SIGNIFICANT CHANGE UP (ref 3.3–5.2)
ALP SERPL-CCNC: 111 U/L — SIGNIFICANT CHANGE UP (ref 40–120)
ALT FLD-CCNC: 62 U/L — HIGH
ANION GAP SERPL CALC-SCNC: 17 MMOL/L — SIGNIFICANT CHANGE UP (ref 5–17)
AST SERPL-CCNC: 82 U/L — HIGH
BASOPHILS # BLD AUTO: 0.01 K/UL — SIGNIFICANT CHANGE UP (ref 0–0.2)
BASOPHILS NFR BLD AUTO: 0.1 % — SIGNIFICANT CHANGE UP (ref 0–2)
BILIRUB SERPL-MCNC: 2.7 MG/DL — HIGH (ref 0.4–2)
BUN SERPL-MCNC: 18 MG/DL — SIGNIFICANT CHANGE UP (ref 8–20)
CALCIUM SERPL-MCNC: 9.5 MG/DL — SIGNIFICANT CHANGE UP (ref 8.6–10.2)
CHLORIDE SERPL-SCNC: 98 MMOL/L — SIGNIFICANT CHANGE UP (ref 98–107)
CO2 SERPL-SCNC: 23 MMOL/L — SIGNIFICANT CHANGE UP (ref 22–29)
CREAT SERPL-MCNC: 0.87 MG/DL — SIGNIFICANT CHANGE UP (ref 0.5–1.3)
EOSINOPHIL # BLD AUTO: 0 K/UL — SIGNIFICANT CHANGE UP (ref 0–0.5)
EOSINOPHIL NFR BLD AUTO: 0 % — SIGNIFICANT CHANGE UP (ref 0–6)
GLUCOSE BLDC GLUCOMTR-MCNC: 168 MG/DL — HIGH (ref 70–99)
GLUCOSE BLDC GLUCOMTR-MCNC: 274 MG/DL — HIGH (ref 70–99)
GLUCOSE SERPL-MCNC: 311 MG/DL — HIGH (ref 70–99)
HCT VFR BLD CALC: 43.5 % — SIGNIFICANT CHANGE UP (ref 39–50)
HGB BLD-MCNC: 15.5 G/DL — SIGNIFICANT CHANGE UP (ref 13–17)
IMM GRANULOCYTES NFR BLD AUTO: 0.6 % — SIGNIFICANT CHANGE UP (ref 0–1.5)
LACTATE BLDV-MCNC: 1.9 MMOL/L — SIGNIFICANT CHANGE UP (ref 0.5–2)
LIDOCAIN IGE QN: 114 U/L — HIGH (ref 22–51)
LYMPHOCYTES # BLD AUTO: 0.58 K/UL — LOW (ref 1–3.3)
LYMPHOCYTES # BLD AUTO: 7 % — LOW (ref 13–44)
MCHC RBC-ENTMCNC: 30.6 PG — SIGNIFICANT CHANGE UP (ref 27–34)
MCHC RBC-ENTMCNC: 35.6 GM/DL — SIGNIFICANT CHANGE UP (ref 32–36)
MCV RBC AUTO: 86 FL — SIGNIFICANT CHANGE UP (ref 80–100)
MONOCYTES # BLD AUTO: 0.18 K/UL — SIGNIFICANT CHANGE UP (ref 0–0.9)
MONOCYTES NFR BLD AUTO: 2.2 % — SIGNIFICANT CHANGE UP (ref 2–14)
NEUTROPHILS # BLD AUTO: 7.51 K/UL — HIGH (ref 1.8–7.4)
NEUTROPHILS NFR BLD AUTO: 90.1 % — HIGH (ref 43–77)
PLATELET # BLD AUTO: 320 K/UL — SIGNIFICANT CHANGE UP (ref 150–400)
POTASSIUM SERPL-MCNC: 4.1 MMOL/L — SIGNIFICANT CHANGE UP (ref 3.5–5.3)
POTASSIUM SERPL-SCNC: 4.1 MMOL/L — SIGNIFICANT CHANGE UP (ref 3.5–5.3)
PROT SERPL-MCNC: 7.7 G/DL — SIGNIFICANT CHANGE UP (ref 6.6–8.7)
RBC # BLD: 5.06 M/UL — SIGNIFICANT CHANGE UP (ref 4.2–5.8)
RBC # FLD: 13 % — SIGNIFICANT CHANGE UP (ref 10.3–14.5)
SODIUM SERPL-SCNC: 138 MMOL/L — SIGNIFICANT CHANGE UP (ref 135–145)
WBC # BLD: 8.33 K/UL — SIGNIFICANT CHANGE UP (ref 3.8–10.5)
WBC # FLD AUTO: 8.33 K/UL — SIGNIFICANT CHANGE UP (ref 3.8–10.5)

## 2020-04-24 PROCEDURE — 71045 X-RAY EXAM CHEST 1 VIEW: CPT | Mod: 26

## 2020-04-24 PROCEDURE — 74177 CT ABD & PELVIS W/CONTRAST: CPT | Mod: 26

## 2020-04-24 PROCEDURE — C1773: CPT

## 2020-04-24 PROCEDURE — 99218: CPT

## 2020-04-24 PROCEDURE — C1769: CPT

## 2020-04-24 PROCEDURE — 43264 ERCP REMOVE DUCT CALCULI: CPT

## 2020-04-24 PROCEDURE — 74330 X-RAY BILE/PANC ENDOSCOPY: CPT

## 2020-04-24 PROCEDURE — 82962 GLUCOSE BLOOD TEST: CPT

## 2020-04-24 PROCEDURE — 43262 ENDO CHOLANGIOPANCREATOGRAPH: CPT

## 2020-04-24 RX ORDER — METRONIDAZOLE 500 MG
500 TABLET ORAL EVERY 8 HOURS
Refills: 0 | Status: DISCONTINUED | OUTPATIENT
Start: 2020-04-25 | End: 2020-04-26

## 2020-04-24 RX ORDER — DEXTROSE 50 % IN WATER 50 %
25 SYRINGE (ML) INTRAVENOUS ONCE
Refills: 0 | Status: DISCONTINUED | OUTPATIENT
Start: 2020-04-24 | End: 2020-04-26

## 2020-04-24 RX ORDER — GLUCAGON INJECTION, SOLUTION 0.5 MG/.1ML
1 INJECTION, SOLUTION SUBCUTANEOUS ONCE
Refills: 0 | Status: DISCONTINUED | OUTPATIENT
Start: 2020-04-24 | End: 2020-04-26

## 2020-04-24 RX ORDER — METRONIDAZOLE 500 MG
500 TABLET ORAL ONCE
Refills: 0 | Status: COMPLETED | OUTPATIENT
Start: 2020-04-24 | End: 2020-04-24

## 2020-04-24 RX ORDER — VALSARTAN 80 MG/1
320 TABLET ORAL DAILY
Refills: 0 | Status: DISCONTINUED | OUTPATIENT
Start: 2020-04-24 | End: 2020-04-26

## 2020-04-24 RX ORDER — SODIUM CHLORIDE 9 MG/ML
1000 INJECTION, SOLUTION INTRAVENOUS
Refills: 0 | Status: DISCONTINUED | OUTPATIENT
Start: 2020-04-24 | End: 2020-04-26

## 2020-04-24 RX ORDER — AMLODIPINE BESYLATE 2.5 MG/1
10 TABLET ORAL DAILY
Refills: 0 | Status: DISCONTINUED | OUTPATIENT
Start: 2020-04-24 | End: 2020-04-26

## 2020-04-24 RX ORDER — HYDROCHLOROTHIAZIDE 25 MG
12.5 TABLET ORAL DAILY
Refills: 0 | Status: DISCONTINUED | OUTPATIENT
Start: 2020-04-24 | End: 2020-04-26

## 2020-04-24 RX ORDER — SODIUM CHLORIDE 9 MG/ML
1000 INJECTION INTRAMUSCULAR; INTRAVENOUS; SUBCUTANEOUS ONCE
Refills: 0 | Status: COMPLETED | OUTPATIENT
Start: 2020-04-24 | End: 2020-04-24

## 2020-04-24 RX ORDER — DEXTROSE 50 % IN WATER 50 %
12.5 SYRINGE (ML) INTRAVENOUS ONCE
Refills: 0 | Status: DISCONTINUED | OUTPATIENT
Start: 2020-04-24 | End: 2020-04-26

## 2020-04-24 RX ORDER — ACETAMINOPHEN 500 MG
650 TABLET ORAL EVERY 6 HOURS
Refills: 0 | Status: DISCONTINUED | OUTPATIENT
Start: 2020-04-24 | End: 2020-04-26

## 2020-04-24 RX ORDER — DEXTROSE 50 % IN WATER 50 %
15 SYRINGE (ML) INTRAVENOUS ONCE
Refills: 0 | Status: DISCONTINUED | OUTPATIENT
Start: 2020-04-24 | End: 2020-04-26

## 2020-04-24 RX ORDER — MORPHINE SULFATE 50 MG/1
4 CAPSULE, EXTENDED RELEASE ORAL ONCE
Refills: 0 | Status: DISCONTINUED | OUTPATIENT
Start: 2020-04-24 | End: 2020-04-24

## 2020-04-24 RX ORDER — MORPHINE SULFATE 50 MG/1
4 CAPSULE, EXTENDED RELEASE ORAL EVERY 6 HOURS
Refills: 0 | Status: DISCONTINUED | OUTPATIENT
Start: 2020-04-24 | End: 2020-04-26

## 2020-04-24 RX ORDER — INSULIN LISPRO 100/ML
VIAL (ML) SUBCUTANEOUS
Refills: 0 | Status: DISCONTINUED | OUTPATIENT
Start: 2020-04-24 | End: 2020-04-26

## 2020-04-24 RX ORDER — SODIUM CHLORIDE 9 MG/ML
1000 INJECTION INTRAMUSCULAR; INTRAVENOUS; SUBCUTANEOUS
Refills: 0 | Status: DISCONTINUED | OUTPATIENT
Start: 2020-04-24 | End: 2020-04-26

## 2020-04-24 RX ORDER — METRONIDAZOLE 500 MG
TABLET ORAL
Refills: 0 | Status: DISCONTINUED | OUTPATIENT
Start: 2020-04-24 | End: 2020-04-26

## 2020-04-24 RX ORDER — CIPROFLOXACIN LACTATE 400MG/40ML
400 VIAL (ML) INTRAVENOUS EVERY 12 HOURS
Refills: 0 | Status: DISCONTINUED | OUTPATIENT
Start: 2020-04-24 | End: 2020-04-26

## 2020-04-24 RX ORDER — ASPIRIN/CALCIUM CARB/MAGNESIUM 324 MG
81 TABLET ORAL DAILY
Refills: 0 | Status: DISCONTINUED | OUTPATIENT
Start: 2020-04-24 | End: 2020-04-26

## 2020-04-24 RX ORDER — METOPROLOL TARTRATE 50 MG
100 TABLET ORAL DAILY
Refills: 0 | Status: DISCONTINUED | OUTPATIENT
Start: 2020-04-24 | End: 2020-04-26

## 2020-04-24 RX ADMIN — MORPHINE SULFATE 4 MILLIGRAM(S): 50 CAPSULE, EXTENDED RELEASE ORAL at 22:51

## 2020-04-24 RX ADMIN — Medication 400 MILLIGRAM(S): at 22:20

## 2020-04-24 RX ADMIN — SODIUM CHLORIDE 125 MILLILITER(S): 9 INJECTION INTRAMUSCULAR; INTRAVENOUS; SUBCUTANEOUS at 22:50

## 2020-04-24 RX ADMIN — MORPHINE SULFATE 4 MILLIGRAM(S): 50 CAPSULE, EXTENDED RELEASE ORAL at 17:59

## 2020-04-24 RX ADMIN — Medication 200 MILLIGRAM(S): at 21:20

## 2020-04-24 RX ADMIN — Medication 100 MILLIGRAM(S): at 22:50

## 2020-04-24 RX ADMIN — SODIUM CHLORIDE 1000 MILLILITER(S): 9 INJECTION INTRAMUSCULAR; INTRAVENOUS; SUBCUTANEOUS at 17:52

## 2020-04-24 NOTE — ED CLERICAL - NS ED CLERK NOTE PRE-ARRIVAL INFORMATION; ADDITIONAL PRE-ARRIVAL INFORMATION
Received call from Dr. Solo. patient with ERCP today- now with severe abdominal pain. Sent to ED to evaluate for post ERCP pancreatitis.

## 2020-04-24 NOTE — ED STATDOCS - PROGRESS NOTE DETAILS
NP NOTE:  Charting reviewed.  Had ERCP today with Dr Solo at [facility]  Presents with right sided abdominal pain and nausea.  Ct pending. CT showing small foci possibly representing extraluminal air in anterior proximal CBD within faby hepatis. Findings d/w Dr. Solo who is recommending prophylactic abx, NPO, IVF. Dr. Solo/GI will see in AM. Pt noting some improvement in pain at this time, mildly ttp RUQ. Will keep in obs overnight.

## 2020-04-24 NOTE — ED CDU PROVIDER INITIAL DAY NOTE - ATTENDING CONTRIBUTION TO CARE
I agree with the PA's note and was available for any issues/concerns. I was directly involved in patient care. My brief overall assessment is as follows: ercp today, had some abd discomfort, with very small air near site, abd non tender, mild distension, no rigidity. reports improved pain and states he feels fine now. lipase wnl. GI requesting pt be obs overnight with abx, and they will reassess in morning. pt well appearing with nl wbc, lactate and benign abd.

## 2020-04-24 NOTE — ED STATDOCS - OBJECTIVE STATEMENT
64 y/o M pt with significant PMHx of DM, HTN, s/p appe, and s/p hernia repair presents to the ED c/o constant right sided stabbing pain s/p ERCP. He notes 1 episode of emesis.  Denies associated fever, chills, SOB, nausea, vomiting, diarrhea, dizziness at the time of evaluation. 66 y/o M pt with significant PMHx of DM, HTN, s/p appe, and s/p hernia repair presents to the ED c/o constant right sided stabbing pain s/p ERCP earlier today. he states that since the procedure he has been having abdominal pain and nausea. He notes 1 episode of emesis.  Denies associated fever, chills, SOB, nausea, vomiting, diarrhea, dizziness at the time of evaluation. Sent in by his gastroenterologist, Dr. Solo, for evaluation and concern for post-ERCP pancreatitis.

## 2020-04-24 NOTE — ED STATDOCS - PSH
H/O hernia repair  1985  H/O neck surgery  PCDF 1992,  ACDF 1993  S/P appendectomy  1974,  right shoulder dislocation 1962

## 2020-04-24 NOTE — ED CDU PROVIDER INITIAL DAY NOTE - OBJECTIVE STATEMENT
64 y/o Male  pt with significant PMHx of DM, HTN, hx of hep C and cirrhosis,  s/p appe, and s/p hernia repair presents to the ED c/o constant right sided stabbing pain s/p ERCP earlier today. he states that since the procedure he has been having abdominal pain and nausea. He notes 1 episode of emesis and 1 episode of NBNB diarrhea . states this happened after he had some Shira min after ERCP . pt states now he is feeling better after fluid. pt is feeling to have BM

## 2020-04-24 NOTE — ED ADULT TRIAGE NOTE - CHIEF COMPLAINT QUOTE
Pt s/o abd surgery this morning , as per pt he had stone removed from bile duct this am , pt c/o  worsening abd pain n/v

## 2020-04-24 NOTE — ED CDU PROVIDER INITIAL DAY NOTE - MEDICAL DECISION MAKING DETAILS
64 y/o Male  pt with significant PMHx of DM, HTN, hx of hep C and cirrhosis,  s/p appe, and s/p hernia repair presents to the ED c/o constant right sided stabbing pain s/p ERCP earlier today  spoke With DR DR Solo,  as per Gi keep the pt on cipro and flagyl and NPO over night pain control , AM GI eval , Home med

## 2020-04-24 NOTE — ED STATDOCS - ATTENDING CONTRIBUTION TO CARE
I, Vera Meyers, performed a face to face bedside interview with this patient regarding history of present illness, review of symptoms and relevant past medical, social and family history.  I completed an independent physical examination. Medical decision making, follow-up on ordered tests (ie labs, radiologic studies) and re-evaluation of the patient's status has been communicated to the ACP.  Disposition of the patient will be based on test outcome and response to ED interventions.

## 2020-04-24 NOTE — ED STATDOCS - CLINICAL SUMMARY MEDICAL DECISION MAKING FREE TEXT BOX
66 y/o M with right sided abd pain with vomiting s/p ERCP. differential includes pancreatitis vs perforation vs other acute intraabdominal pathology will check labs Ct give fluids, analgesia and reassess

## 2020-04-24 NOTE — BRIEF OPERATIVE NOTE - OPERATION/FINDINGS
CBD selectively cannulated. CBD dilated to 10 mm, filling defect noted  s/p sphincterotomy  multiple balloon sweeps with 12 mm extraction balloon with removal of a 5 mm yellow pigmented stone

## 2020-04-24 NOTE — ED ADULT NURSE NOTE - OBJECTIVE STATEMENT
66 y/o male presenting to ED post discharge after stone extraction this am at [facility].  c/o right sided abdominal pain radiating to flank, 1 episode of vomiting and loose yellow bowel.  vomitus clear.

## 2020-04-24 NOTE — ED STATDOCS - PMH
Chronic neck pain    Diabetes  history of retinal hemorrhage  Hepatitis C  treated 2014  Hypertension    Lung nodule    Murmur

## 2020-04-25 DIAGNOSIS — K85.90 ACUTE PANCREATITIS WITHOUT NECROSIS OR INFECTION, UNSPECIFIED: ICD-10-CM

## 2020-04-25 LAB
ALBUMIN SERPL ELPH-MCNC: 4.2 G/DL — SIGNIFICANT CHANGE UP (ref 3.3–5.2)
ALP SERPL-CCNC: 99 U/L — SIGNIFICANT CHANGE UP (ref 40–120)
ALT FLD-CCNC: 66 U/L — HIGH
AMYLASE P1 CFR SERPL: 375 U/L — HIGH (ref 36–128)
ANION GAP SERPL CALC-SCNC: 17 MMOL/L — SIGNIFICANT CHANGE UP (ref 5–17)
AST SERPL-CCNC: 48 U/L — HIGH
BILIRUB DIRECT SERPL-MCNC: 0.4 MG/DL — HIGH (ref 0–0.3)
BILIRUB SERPL-MCNC: 1.4 MG/DL — SIGNIFICANT CHANGE UP (ref 0.4–2)
BUN SERPL-MCNC: 14 MG/DL — SIGNIFICANT CHANGE UP (ref 8–20)
CALCIUM SERPL-MCNC: 8.8 MG/DL — SIGNIFICANT CHANGE UP (ref 8.6–10.2)
CHLORIDE SERPL-SCNC: 102 MMOL/L — SIGNIFICANT CHANGE UP (ref 98–107)
CO2 SERPL-SCNC: 22 MMOL/L — SIGNIFICANT CHANGE UP (ref 22–29)
CREAT SERPL-MCNC: 0.8 MG/DL — SIGNIFICANT CHANGE UP (ref 0.5–1.3)
GLUCOSE BLDC GLUCOMTR-MCNC: 183 MG/DL — HIGH (ref 70–99)
GLUCOSE BLDC GLUCOMTR-MCNC: 203 MG/DL — HIGH (ref 70–99)
GLUCOSE BLDC GLUCOMTR-MCNC: 210 MG/DL — HIGH (ref 70–99)
GLUCOSE BLDC GLUCOMTR-MCNC: 214 MG/DL — HIGH (ref 70–99)
GLUCOSE SERPL-MCNC: 210 MG/DL — HIGH (ref 70–99)
LIDOCAIN IGE QN: 382 U/L — HIGH (ref 22–51)
POTASSIUM SERPL-MCNC: 3.9 MMOL/L — SIGNIFICANT CHANGE UP (ref 3.5–5.3)
POTASSIUM SERPL-SCNC: 3.9 MMOL/L — SIGNIFICANT CHANGE UP (ref 3.5–5.3)
PROT SERPL-MCNC: 7.4 G/DL — SIGNIFICANT CHANGE UP (ref 6.6–8.7)
SODIUM SERPL-SCNC: 141 MMOL/L — SIGNIFICANT CHANGE UP (ref 135–145)

## 2020-04-25 PROCEDURE — 99223 1ST HOSP IP/OBS HIGH 75: CPT

## 2020-04-25 PROCEDURE — 99217: CPT

## 2020-04-25 PROCEDURE — 74181 MRI ABDOMEN W/O CONTRAST: CPT | Mod: 26

## 2020-04-25 RX ORDER — HEPARIN SODIUM 5000 [USP'U]/ML
5000 INJECTION INTRAVENOUS; SUBCUTANEOUS EVERY 8 HOURS
Refills: 0 | Status: DISCONTINUED | OUTPATIENT
Start: 2020-04-25 | End: 2020-04-26

## 2020-04-25 RX ORDER — DEXTROSE 50 % IN WATER 50 %
25 SYRINGE (ML) INTRAVENOUS ONCE
Refills: 0 | Status: DISCONTINUED | OUTPATIENT
Start: 2020-04-25 | End: 2020-04-26

## 2020-04-25 RX ORDER — DEXTROSE 50 % IN WATER 50 %
15 SYRINGE (ML) INTRAVENOUS ONCE
Refills: 0 | Status: DISCONTINUED | OUTPATIENT
Start: 2020-04-25 | End: 2020-04-26

## 2020-04-25 RX ORDER — SODIUM CHLORIDE 9 MG/ML
1000 INJECTION, SOLUTION INTRAVENOUS
Refills: 0 | Status: DISCONTINUED | OUTPATIENT
Start: 2020-04-25 | End: 2020-04-26

## 2020-04-25 RX ORDER — ONDANSETRON 8 MG/1
4 TABLET, FILM COATED ORAL EVERY 6 HOURS
Refills: 0 | Status: DISCONTINUED | OUTPATIENT
Start: 2020-04-25 | End: 2020-04-26

## 2020-04-25 RX ORDER — INSULIN LISPRO 100/ML
VIAL (ML) SUBCUTANEOUS AT BEDTIME
Refills: 0 | Status: DISCONTINUED | OUTPATIENT
Start: 2020-04-25 | End: 2020-04-26

## 2020-04-25 RX ORDER — INSULIN LISPRO 100/ML
VIAL (ML) SUBCUTANEOUS
Refills: 0 | Status: DISCONTINUED | OUTPATIENT
Start: 2020-04-25 | End: 2020-04-26

## 2020-04-25 RX ORDER — HUMAN INSULIN 100 [IU]/ML
4 INJECTION, SUSPENSION SUBCUTANEOUS AT BEDTIME
Refills: 0 | Status: DISCONTINUED | OUTPATIENT
Start: 2020-04-25 | End: 2020-04-26

## 2020-04-25 RX ORDER — GLUCAGON INJECTION, SOLUTION 0.5 MG/.1ML
1 INJECTION, SOLUTION SUBCUTANEOUS ONCE
Refills: 0 | Status: DISCONTINUED | OUTPATIENT
Start: 2020-04-25 | End: 2020-04-26

## 2020-04-25 RX ORDER — DEXTROSE 50 % IN WATER 50 %
12.5 SYRINGE (ML) INTRAVENOUS ONCE
Refills: 0 | Status: DISCONTINUED | OUTPATIENT
Start: 2020-04-25 | End: 2020-04-26

## 2020-04-25 RX ADMIN — Medication 200 MILLIGRAM(S): at 17:04

## 2020-04-25 RX ADMIN — Medication 81 MILLIGRAM(S): at 12:34

## 2020-04-25 RX ADMIN — Medication 650 MILLIGRAM(S): at 21:32

## 2020-04-25 RX ADMIN — SODIUM CHLORIDE 200 MILLILITER(S): 9 INJECTION, SOLUTION INTRAVENOUS at 12:34

## 2020-04-25 RX ADMIN — Medication 12.5 MILLIGRAM(S): at 05:38

## 2020-04-25 RX ADMIN — HEPARIN SODIUM 5000 UNIT(S): 5000 INJECTION INTRAVENOUS; SUBCUTANEOUS at 21:34

## 2020-04-25 RX ADMIN — Medication 100 MILLIGRAM(S): at 16:59

## 2020-04-25 RX ADMIN — Medication 500 MILLIGRAM(S): at 08:07

## 2020-04-25 RX ADMIN — HEPARIN SODIUM 5000 UNIT(S): 5000 INJECTION INTRAVENOUS; SUBCUTANEOUS at 14:41

## 2020-04-25 RX ADMIN — HUMAN INSULIN 4 UNIT(S): 100 INJECTION, SUSPENSION SUBCUTANEOUS at 21:28

## 2020-04-25 RX ADMIN — Medication 400 MILLIGRAM(S): at 10:27

## 2020-04-25 RX ADMIN — Medication 1: at 08:13

## 2020-04-25 RX ADMIN — SODIUM CHLORIDE 125 MILLILITER(S): 9 INJECTION INTRAMUSCULAR; INTRAVENOUS; SUBCUTANEOUS at 05:37

## 2020-04-25 RX ADMIN — Medication 100 MILLIGRAM(S): at 21:23

## 2020-04-25 RX ADMIN — Medication 200 MILLIGRAM(S): at 08:19

## 2020-04-25 RX ADMIN — VALSARTAN 320 MILLIGRAM(S): 80 TABLET ORAL at 05:38

## 2020-04-25 RX ADMIN — Medication 2: at 11:37

## 2020-04-25 RX ADMIN — AMLODIPINE BESYLATE 10 MILLIGRAM(S): 2.5 TABLET ORAL at 05:39

## 2020-04-25 RX ADMIN — Medication 100 MILLIGRAM(S): at 05:38

## 2020-04-25 RX ADMIN — Medication 100 MILLIGRAM(S): at 05:37

## 2020-04-25 RX ADMIN — Medication 2: at 16:26

## 2020-04-25 NOTE — H&P ADULT - NSHPPHYSICALEXAM_GEN_ALL_CORE
Vital Signs Last 24 Hrs  T(C): 37.1 (25 Apr 2020 11:05), Max: 37.1 (25 Apr 2020 04:04)  T(F): 98.8 (25 Apr 2020 11:05), Max: 98.8 (25 Apr 2020 08:00)  HR: 79 (25 Apr 2020 11:05) (79 - 89)  BP: 128/81 (25 Apr 2020 11:05) (128/74 - 159/83)  BP(mean): --  RR: 18 (25 Apr 2020 11:05) (18 - 20)  SpO2: 97% (25 Apr 2020 11:05) (96% - 97%) Vital Signs Last 24 Hrs  T(C): 37.1 (25 Apr 2020 11:05), Max: 37.1 (25 Apr 2020 04:04)  T(F): 98.8 (25 Apr 2020 11:05), Max: 98.8 (25 Apr 2020 08:00)  HR: 79 (25 Apr 2020 11:05) (79 - 89)  BP: 128/81 (25 Apr 2020 11:05) (128/74 - 159/83)  RR: 18 (25 Apr 2020 11:05) (18 - 20)  SpO2: 97% (25 Apr 2020 11:05) (96% - 97%)    PHYSICAL EXAM:    GENERAL: Elderly male looking comfortable   HEENT: PERRL, +EOMI  NECK: soft, Supple, No JVD,   CHEST/LUNG: Clear to auscultate bilaterally; No wheezing  HEART: S1S2+, Regular rate and rhythm; No murmurs  ABDOMEN: Soft, RUQ mild tenderness, Nondistended; Bowel sounds present  EXTREMITIES:  1+ Peripheral Pulses, No edema  SKIN: No rashes or lesions  NEURO: AAOX3, no focal deficits, no motor r sensory loss  PSYCH: normal mood

## 2020-04-25 NOTE — ED CDU PROVIDER DISPOSITION NOTE - CLINICAL COURSE
This is 64 y/o M pt with significant PMHx of DM, HTN, s/p appe, and s/p hernia repair presents to the ED c/o constant right sided stabbing pain s/p ERCP earlier today. he states that since the procedure he has been having abdominal pain and nausea. He notes 1 episode of emesis.  Denies associated fever, chills, SOB, nausea, vomiting, diarrhea, dizziness at the time of evaluation. Sent in by his gastroenterologist, Dr. Solo, for evaluation and concern for post-ERCP pancreatitis.  While in ED patient had lab results indicating pancreatitis, CT showed tiny foci of air likely due to instrumentation from recent ERCP. Patient placed in observation for MRCP, negative for retained stone.  Patient feeling better in NAD, abdomen soft NT ND, stable for discharge home.  Given copies of all results.  Understands and agrees to proceed. This is 64 y/o M pt with significant PMHx of DM, HTN, s/p appe, and s/p hernia repair presents to the ED c/o constant right sided stabbing pain s/p ERCP earlier today. he states that since the procedure he has been having abdominal pain and nausea. He notes 1 episode of emesis.  Denies associated fever, chills, SOB, nausea, vomiting, diarrhea, dizziness at the time of evaluation. Sent in by his gastroenterologist, Dr. Solo, for evaluation and concern for post-ERCP pancreatitis.  While in ED patient had lab results indicating pancreatitis, CT showed tiny foci of air likely due to instrumentation from recent ERCP. Patient placed in observation for MRCP, negative for retained stone.  Spoke to GI impression post ERCP biliary pancreatitis, MD Solo recommends admission for IVF hydration and trending amylase and lipase and LFTs.   Case to be discussed with tele hospitalist for MD Sultana.

## 2020-04-25 NOTE — PROGRESS NOTE ADULT - ASSESSMENT
65y man with choledocholithiasis s/p ERCP with stone removal. He developed pain yesterday evening. Mildly elevated lipase likely mild post ERCP pancreatitis. Abnormal CT a/p.   monitor LFT  cont antibiotics  MRCP pending  NPO  cont LR     Thanks

## 2020-04-25 NOTE — ED CDU PROVIDER SUBSEQUENT DAY NOTE - MEDICAL DECISION MAKING DETAILS
66 y/o Male  pt with significant PMHx of DM, HTN, hx of hep C and cirrhosis,  s/p appe, and s/p hernia repair presents to the ED c/o constant right sided stabbing pain s/p ERCP earlier today  spoke With DR DR Solo,  as per Gi keep the pt on cipro and flagyl and NPO over night pain control , AM GI eval ,

## 2020-04-25 NOTE — ED ADULT NURSE REASSESSMENT NOTE - COMFORT CARE
plan of care explained/side rails up/ambulated to bathroom/darkened lights/repositioned/wait time explained

## 2020-04-25 NOTE — ED ADULT NURSE REASSESSMENT NOTE - NS ED NURSE REASSESS COMMENT FT1
Assumed pt. care at 2215 from off going RN. Pt. A&Ox3. Pt. respirations even and unlabored. Pt. resting comfortably in stretcher in no apparent distress. Pt. states he had an in and out procedure this morning to remove stones. Pt. states when he went home he had a lot of pain with N/V/D. Pt. states his pain is tolerable right now. Pt. informed of plan of care for IV antibiotics, fluids and an MRI in the morning.
Pt. mental status unchanged. Pt. respirations  even and unlabored. Pt. ambulatory with steady gait. Pt. states the morphine helped his pain.
Pt. mental status unchanged. Pt. respirations even and unlabored. Pt. resting comfortably in stretcher. Pt. has fluids and antibiotics infusing well. FS performed as ordered.
Assumed care of the patient at 0730. Patient A&Ox4. No s/s of distress or pain. Denies abdominal pain/nausea or vomiting. Patient remains NPO for MRCP testing this am. IVF infusing as per orders. Patient ambulatory. Patient also pending rpt blood work and GI consult. Patient in understanding of plan of care. Patient with no further questions for the RN. Call bell within reach and encouraged to use when assistance needed. Resting in comfort. Will continue to monitor.

## 2020-04-25 NOTE — H&P ADULT - ASSESSMENT
65 year old male w DM, hemochromatosis, HCV w chronic liver disease + cirrhosis, HTN presented to ED 04-24 c/o constant right sided stabbing pain s/p ERCP earlier.    #Acute pancreatitis  s/p ERCP 04-24  1. admit to med  2. advance NPO to consistant carb liquids  3. zofran prn  4. morphine prn  5. IVF  6. GI following  7. cipro /flagyl  IV    #DM  1. BGM  2. ISS    #elevated LFT  #Hemochromatosis  #HCV associated liver disease  1. trend  2. w PT/INR in AM    #HTN  already ordered  1. amlodipine 10 mg  2. valsartan 320 mg  3. HCTZ 12.5  4. toprol 100 mg      IMPROVE VTE Individual Risk Assessment    RISK                                                                Points    [  ] Previous VTE                                                  3    [  ] Thrombophilia                                               2    [  ] Lower limb paralysis                                      2        (unable to hold up >15 seconds)      [  ] Current Cancer                                              2         (within 6 months)    [  ] Immobilization > 24 hrs                                1    [  ] ICU/CCU stay > 24 hours                              1    [ X ] Age > 60                                                      1    IMPROVE VTE Score ____1_____    IMPROVE Score 0-1: Low Risk, No VTE prophylaxis required for most patients, encourage ambulation.   IMPROVE Score 2-3: At risk, pharmacologic VTE prophylaxis is indicated for most patients (in the absence of a contraindication)  IMPROVE Score > or = 4: High Risk, pharmacologic VTE prophylaxis is indicated for most patients (in the absence of a contraindication) 65 year old male w DM, hemochromatosis, HCV w chronic liver disease + cirrhosis, HTN presented to ED 04-24 c/o constant right sided stabbing pain s/p ERCP earlier.    #Acute pancreatitis  s/p ERCP 04-24  1. admit to med  2. advance NPO to consistant carb liquids  3. zofran prn  4. morphine prn  5. IVF  6. GI following  7. cipro /flagyl  IV    #DM  1. BGM  2. ISS    #elevated LFT  #Hemochromatosis  #HCV associated liver disease  1. trend  2. w PT/INR in AM    #HTN  already ordered  1. amlodipine 10 mg  2. valsartan 320 mg  3. HCTZ 12.5  4. toprol 100 mg    #Hyperthyroid  1. methimazole 5 mg pt states q 48 hrs      IMPROVE VTE Individual Risk Assessment    RISK                                                                Points    [  ] Previous VTE                                                  3    [  ] Thrombophilia                                               2    [  ] Lower limb paralysis                                      2        (unable to hold up >15 seconds)      [  ] Current Cancer                                              2         (within 6 months)    [  ] Immobilization > 24 hrs                                1    [  ] ICU/CCU stay > 24 hours                              1    [ X ] Age > 60                                                      1    IMPROVE VTE Score ____1_____    IMPROVE Score 0-1: Low Risk, No VTE prophylaxis required for most patients, encourage ambulation.   IMPROVE Score 2-3: At risk, pharmacologic VTE prophylaxis is indicated for most patients (in the absence of a contraindication)  IMPROVE Score > or = 4: High Risk, pharmacologic VTE prophylaxis is indicated for most patients (in the absence of a contraindication) 65 year old male w DM, hemochromatosis, HCV w chronic liver disease + cirrhosis, HTN presented to ED 04-24 c/o constant right sided stabbing pain s/p ERCP earlier.    #Acute pancreatitis  s/p ERCP 04-24  1. admit to med  2. advance NPO to consistant carb liquids once ok from GI  3. zofran prn  4. morphine prn  5. IVF  6. GI following  7. cipro /flagyl  IV as per GI.     #DM  1. BGM  2. ISS    #elevated LFT:   #Hemochromatosis  #HCV associated liver disease  1. trend  2. w PT/INR in AM    #HTN  already ordered  1. amlodipine 10 mg  2. valsartan 320 mg  3. HCTZ 12.5  4. toprol 100 mg    #Hyperthyroid  1. methimazole 5 mg pt states q 48 hrs      IMPROVE VTE Individual Risk Assessment    RISK                                                                Points    [  ] Previous VTE                                                  3    [  ] Thrombophilia                                               2    [  ] Lower limb paralysis                                      2        (unable to hold up >15 seconds)      [  ] Current Cancer                                              2         (within 6 months)    [  ] Immobilization > 24 hrs                                1    [  ] ICU/CCU stay > 24 hours                              1    [ X ] Age > 60                                                      1    IMPROVE VTE Score ____1_____    IMPROVE Score 0-1: Low Risk, No VTE prophylaxis required for most patients, encourage ambulation.   IMPROVE Score 2-3: At risk, pharmacologic VTE prophylaxis is indicated for most patients (in the absence of a contraindication)  IMPROVE Score > or = 4: High Risk, pharmacologic VTE prophylaxis is indicated for most patients (in the absence of a contraindication)

## 2020-04-25 NOTE — H&P ADULT - GIT GEN HX ROS MEA POS PC
Patient received IM Depo Provera to the upper outer side of left buttock on 03/19/2019     The patient was instructed to get the next injection on 06/04-06/18/19     Lot Number: [id number]  Expiration Date: 12/2020  BY KENA BLACK     nausea/vomiting/abdominal pain

## 2020-04-25 NOTE — H&P ADULT - NSICDXPASTSURGICALHX_GEN_ALL_CORE_FT
PAST SURGICAL HISTORY:  H/O hernia repair 1985    H/O neck surgery PCDF 1992,  ACDF 1993    S/P appendectomy 1974,  right shoulder dislocation 1962

## 2020-04-25 NOTE — H&P ADULT - NSICDXPASTMEDICALHX_GEN_ALL_CORE_FT
PAST MEDICAL HISTORY:  Chronic neck pain     Diabetes history of retinal hemorrhage    Hepatitis C treated 2014    Hypertension     Lung nodule     Murmur

## 2020-04-25 NOTE — H&P ADULT - NSHPLABSRESULTS_GEN_ALL_CORE
15.5   8.33  )-----------( 320      ( 24 Apr 2020 18:00 )             43.5     04-25    141  |  102  |  14.0  ----------------------------<  210<H>  3.9   |  22.0  |  0.80    Ca    8.8      25 Apr 2020 08:28    TPro  7.4  /  Alb  4.2  /  TBili  1.4  /  DBili  0.4<H>  /  AST  48<H>  /  ALT  66<H>  /  AlkPhos  99  04-25

## 2020-04-25 NOTE — ED CDU PROVIDER SUBSEQUENT DAY NOTE - ATTENDING CONTRIBUTION TO CARE
I, Quentin Harrison, performed the initial face to face bedside interview with this patient regarding history of present illness, review of symptoms and relevant past medical, social and family history.  I completed an independent physical examination.  I was the initial provider who evaluated this patient. I have signed out the follow up of any pending tests (i.e. labs, radiological studies) to the ACP.  I have communicated the patient’s plan of care and disposition with the ACP.

## 2020-04-25 NOTE — PROGRESS NOTE ADULT - SUBJECTIVE AND OBJECTIVE BOX
INTERVAL HPI/OVERNIGHT EVENTS: Mildly elevated lipase.  Awaiting MRCP. He denies any abdominal pain currently and overnight. He did not need any pain meds.     ROS wnl     PAST MEDICAL & SURGICAL HISTORY:  Lung nodule  Murmur  Chronic neck pain  Hepatitis C: treated 2014  Hypertension  Diabetes: history of retinal hemorrhage  H/O hernia repair: 1985  S/P appendectomy: 1974,  right shoulder dislocation 1962  H/O neck surgery: PCDF 1992,  ACDF 1993      Home Medications:  Exforge HCT 10 mg-160 mg-12.5 mg oral tablet: 1 tab(s) orally once a day (30 Jul 2015 05:54)  labetalol 100 mg oral tablet: 1 tab(s) orally 2 times a day (30 Jul 2015 05:54)  NovoLIN 70/30 PenFill:   once a day (30 Jul 2015 05:54)  oxyCODONE 10 mg oral tablet: 1 tab(s) orally every 6 hours, As Needed (30 Jul 2015 05:54)      MEDICATIONS  (STANDING):  amLODIPine   Tablet 10 milliGRAM(s) Oral daily  aspirin enteric coated 81 milliGRAM(s) Oral daily  ciprofloxacin   IVPB 400 milliGRAM(s) IV Intermittent every 12 hours  dextrose 5%. 1000 milliLiter(s) (50 mL/Hr) IV Continuous <Continuous>  dextrose 50% Injectable 12.5 Gram(s) IV Push once  dextrose 50% Injectable 25 Gram(s) IV Push once  dextrose 50% Injectable 25 Gram(s) IV Push once  hydrochlorothiazide 12.5 milliGRAM(s) Oral daily  insulin lispro (HumaLOG) corrective regimen sliding scale   SubCutaneous three times a day before meals  methimazole 5 milliGRAM(s) Oral daily  metoprolol succinate  milliGRAM(s) Oral daily  metroNIDAZOLE  IVPB      metroNIDAZOLE  IVPB 500 milliGRAM(s) IV Intermittent every 8 hours  sodium chloride 0.9%. 1000 milliLiter(s) (125 mL/Hr) IV Continuous <Continuous>  valsartan 320 milliGRAM(s) Oral daily    MEDICATIONS  (PRN):  acetaminophen   Tablet .. 650 milliGRAM(s) Oral every 6 hours PRN Temp greater or equal to 38C (100.4F), Mild Pain (1 - 3)  dextrose 40% Gel 15 Gram(s) Oral once PRN Blood Glucose LESS THAN 70 milliGRAM(s)/deciliter  glucagon  Injectable 1 milliGRAM(s) IntraMuscular once PRN Glucose LESS THAN 70 milligrams/deciliter  morphine  - Injectable 4 milliGRAM(s) IV Push every 6 hours PRN Severe Pain (7 - 10)      Allergies    No Known Allergies    Intolerances          PHYSICAL EXAM:   Vital Signs:  Vital Signs Last 24 Hrs  T(C): 37.1 (25 Apr 2020 08:00), Max: 37.1 (25 Apr 2020 04:04)  T(F): 98.8 (25 Apr 2020 08:00), Max: 98.8 (25 Apr 2020 08:00)  HR: 84 (25 Apr 2020 08:00) (80 - 89)  BP: 136/79 (25 Apr 2020 08:00) (128/74 - 159/83)  BP(mean): --  RR: 18 (25 Apr 2020 08:00) (18 - 20)  SpO2: 97% (25 Apr 2020 08:00) (96% - 97%)  Daily Height in cm: 167.64 (24 Apr 2020 17:05)    Daily     GENERAL:  no distress  HEENT:  NC/AT,  anicteric  ABDOMEN:  Soft, non-tender, non-distended, normoactive bowel sounds  EXTREMITIES  no cyanosis      LABS:                        15.5   8.33  )-----------( 320      ( 24 Apr 2020 18:00 )             43.5       Hemoglobin: 15.5 g/dL (04-24-20 @ 18:00)      04-24    138  |  98  |  18.0  ----------------------------<  311<H>  4.1   |  23.0  |  0.87    Ca    9.5      24 Apr 2020 18:00    TPro  7.7  /  Alb  4.5  /  TBili  2.7<H>  /  DBili  x   /  AST  82<H>  /  ALT  62<H>  /  AlkPhos  111  04-24          Alanine Aminotransferase (ALT/SGPT): 62 U/L (04-24-20 @ 18:00)  Alkaline Phosphatase, Serum: 111 U/L (04-24-20 @ 18:00)  Aspartate Aminotransferase (AST/SGOT): 82 U/L (04-24-20 @ 18:00)      RADIOLOGY & ADDITIONAL TESTS:  < from: CT Abdomen and Pelvis w/ IV Cont (04.24.20 @ 19:10) >  FINDINGS:    LOWER CHEST: Scarlike opacities are identified within the bilateral lower lung fields.    LIVER: Within normal limits.  BILE DUCTS: Contrast material introduced at the time of ERCP examination is identified within the gallbladder lumen as well as the intrahepatic and extrahepatic biliary tree. There is a tiny focus of air attenuation identified anterior to the proximal common bile duct within the faby hepatis (series 3, image 47) which may represent a small focus of extraluminal air. No additional foci of air attenuation are identified in the region of the faby hepatis or within the intraperitoneal space.  GALLBLADDER: No filling defects. No gallbladder wall thickening.  SPLEEN: Within normal limits.  PANCREAS: Within normal limits.  ADRENALS: Within normal limits.  KIDNEYS/URETERS: There is no evidence for bilateral hydronephrosis, renal calculi or space-occupying lesions of the renal parenchyma.    BLADDER: Within normal limits.  REPRODUCTIVE ORGANS: The prostate appears enlarged.    BOWEL: Fecal material scattered throughout the colon. There is no evidence for mechanical bowel obstruction. No colonic wall thickening is identified. The appendix is not visualized.  PERITONEUM: No free intraperitoneal fluid is identified.  VESSELS: Within normal limits.  RETROPERITONEUM/LYMPH NODES: No lymphadenopathy.    ABDOMINAL WALL: Within normal limits.  BONES: Degenerative changes.    IMPRESSION:  Contrast material introduced at the time of ERCP examination is identified within the gallbladder lumen as well as the intrahepatic and extrahepatic biliary tree. There is a tiny focus of air attenuation identified anterior to the proximal common bile duct within the faby hepatis (series 3, image 47) which may represent a small focus of extraluminal air. No additional foci of air attenuation are identified in the region of the faby hepatis or within the intraperitoneal space.    < end of copied text >

## 2020-04-25 NOTE — H&P ADULT - ATTENDING COMMENTS
VTE sq heparin  med rec done w pt VTE sq heparin  med rec done w pt  Patient is seen and evaluated, Physical exam added in the chart, agree with assessment and plan.

## 2020-04-25 NOTE — ED CDU PROVIDER SUBSEQUENT DAY NOTE - HISTORY
66 y/o Male  pt with significant PMHx of DM, HTN, hx of hep C and cirrhosis,  s/p appe, and s/p hernia repair presents to the ED c/o constant right sided stabbing pain s/p ERCP earlier yesterday . with 1 episode of Diarrhea and vomiting NBNB once and. pt came in ER With small air . called DR keshawn butler NPO , Cipro and flagyl IV  and Am MR ERCP for the pt , am GI re eval . no pain or compliant since last might. pt rest over night

## 2020-04-25 NOTE — H&P ADULT - HISTORY OF PRESENT ILLNESS
65 year old male w DM, hemochromatosis, HCV w chronic liver disease + cirrhosis, HTN presented to ED 04-24 c/o constant right sided stabbing pain s/p ERCP earlier.    He notes 1 episode of emesis  RUQ pain 9/10 constant.    Denies associated fever, chills, SOB, nausea, vomiting, diarrhea, dizziness at the time of evaluation. Sent in by his gastroenterologist, Dr. Solo, for evaluation and concern for post-ERCP pancreatitis.    In ED VSS on review  lipase 114 to 382, amylase 375  MRCP  Given 1 L NS then placed on  cc/hr  In ED given morphine and RUQ pain is 1/10    PAST MEDICAL HX  AS aortic stenosis  Chronic liver disease and cirrhosis due to HCV  Chronic neck pain    Diabetes  history of retinal hemorrhage  Hemochromatosis dx 2005; Dr. Nunn for periodic phlebotomies as needed  Hepatitis C  treated 2014 and is a sustained virologic responder  HTN hypertension    Lung nodule    Murmur of AS long standing  Severe eccentric MR mitral regurg    PAST SURGICAL HX  H/o back surgery  H/O inguinal hernia repair  1985  H/O neck surgery  PCDF 1992,  ACDF 1993  S/P appendectomy  1974,  Right shoulder dislocation 1962.  Liver biopsy 2006 stage 3-4 disease      FAMILY HX  no anesthesia reaction, no sudden death, no clotting disorder and no bleeding disorder  Family history of Hypertension   Family history of Diabetes Mellitus      SOCIAL HX  former smoker  stopped alc 65 year old male w DM, hemochromatosis, HCV w chronic liver disease + cirrhosis, HTN presented to ED 04-24 c/o constant right sided stabbing pain s/p ERCP earlier.    He notes 1 episode of emesis  RUQ pain 9/10 constant.    Denies associated fever, chills, SOB, nausea, vomiting, diarrhea, dizziness at the time of evaluation. Sent in by his gastroenterologist, Dr. Solo, for evaluation and concern for post-ERCP pancreatitis.    In ED VSS on review  lipase 114 to 382, amylase 375  MRCP  Given 1 L NS then placed on  cc/hr  In ED given morphine and RUQ pain is 1/10    PAST MEDICAL HX  AS aortic stenosis  Chronic liver disease and cirrhosis due to HCV  Chronic neck pain    Diabetes  history of retinal hemorrhage  Hemochromatosis dx 2005; Dr. Nunn for periodic phlebotomies as needed  Hepatitis C  treated 2014 and is a sustained virologic responder  HTN hypertension    Hyperthyroid dx 2019  Lung nodule    Murmur of AS long standing  Severe eccentric MR mitral regurg    PAST SURGICAL HX  H/o back surgery  H/O inguinal hernia repair  1985  H/O neck surgery  PCDF 1992,  ACDF 1993  S/P appendectomy  1974,  Right shoulder dislocation 1962.  Liver biopsy 2006 stage 3-4 disease      FAMILY HX  no anesthesia reaction, no sudden death, no clotting disorder and no bleeding disorder  Family history of Hypertension   Family history of Diabetes Mellitus      SOCIAL HX  former smoker  stopped alc

## 2020-04-25 NOTE — ED CDU PROVIDER DISPOSITION NOTE - PATIENT PORTAL LINK FT
You can access the FollowMyHealth Patient Portal offered by [facility] by registering at the following website: [email or web address]. By joining [employer]’s FollowMyHealth portal, you will also be able to view your health information using other applications (apps) compatible with our system.

## 2020-04-26 ENCOUNTER — TRANSCRIPTION ENCOUNTER (OUTPATIENT)
Age: 65
End: 2020-04-26

## 2020-04-26 VITALS
SYSTOLIC BLOOD PRESSURE: 121 MMHG | HEART RATE: 71 BPM | RESPIRATION RATE: 18 BRPM | DIASTOLIC BLOOD PRESSURE: 71 MMHG | TEMPERATURE: 98 F | OXYGEN SATURATION: 99 %

## 2020-04-26 LAB
A1C WITH ESTIMATED AVERAGE GLUCOSE RESULT: 6.7 % — HIGH (ref 4–5.6)
ALBUMIN SERPL ELPH-MCNC: 3.8 G/DL — SIGNIFICANT CHANGE UP (ref 3.3–5.2)
ALBUMIN SERPL ELPH-MCNC: 3.8 G/DL — SIGNIFICANT CHANGE UP (ref 3.3–5.2)
ALBUMIN SERPL ELPH-MCNC: 4 G/DL — SIGNIFICANT CHANGE UP (ref 3.3–5.2)
ALP SERPL-CCNC: 138 U/L — HIGH (ref 40–120)
ALP SERPL-CCNC: 139 U/L — HIGH (ref 40–120)
ALP SERPL-CCNC: 139 U/L — HIGH (ref 40–120)
ALT FLD-CCNC: 241 U/L — HIGH
ALT FLD-CCNC: 241 U/L — HIGH
ALT FLD-CCNC: 245 U/L — HIGH
AMYLASE P1 CFR SERPL: 105 U/L — SIGNIFICANT CHANGE UP (ref 36–128)
ANION GAP SERPL CALC-SCNC: 14 MMOL/L — SIGNIFICANT CHANGE UP (ref 5–17)
AST SERPL-CCNC: 223 U/L — HIGH
AST SERPL-CCNC: 267 U/L — HIGH
AST SERPL-CCNC: 267 U/L — HIGH
BASOPHILS # BLD AUTO: 0.03 K/UL — SIGNIFICANT CHANGE UP (ref 0–0.2)
BASOPHILS NFR BLD AUTO: 0.5 % — SIGNIFICANT CHANGE UP (ref 0–2)
BILIRUB DIRECT SERPL-MCNC: 1.2 MG/DL — HIGH (ref 0–0.3)
BILIRUB DIRECT SERPL-MCNC: 1.7 MG/DL — HIGH (ref 0–0.3)
BILIRUB INDIRECT FLD-MCNC: 1 MG/DL — SIGNIFICANT CHANGE UP (ref 0.2–1)
BILIRUB INDIRECT FLD-MCNC: 1.2 MG/DL — HIGH (ref 0.2–1)
BILIRUB SERPL-MCNC: 2.2 MG/DL — HIGH (ref 0.4–2)
BILIRUB SERPL-MCNC: 2.9 MG/DL — HIGH (ref 0.4–2)
BILIRUB SERPL-MCNC: 2.9 MG/DL — HIGH (ref 0.4–2)
BUN SERPL-MCNC: 11 MG/DL — SIGNIFICANT CHANGE UP (ref 8–20)
CALCIUM SERPL-MCNC: 8.4 MG/DL — LOW (ref 8.6–10.2)
CHLORIDE SERPL-SCNC: 103 MMOL/L — SIGNIFICANT CHANGE UP (ref 98–107)
CO2 SERPL-SCNC: 22 MMOL/L — SIGNIFICANT CHANGE UP (ref 22–29)
CREAT SERPL-MCNC: 0.6 MG/DL — SIGNIFICANT CHANGE UP (ref 0.5–1.3)
EOSINOPHIL # BLD AUTO: 0.18 K/UL — SIGNIFICANT CHANGE UP (ref 0–0.5)
EOSINOPHIL NFR BLD AUTO: 3.1 % — SIGNIFICANT CHANGE UP (ref 0–6)
ESTIMATED AVERAGE GLUCOSE: 146 MG/DL — HIGH (ref 68–114)
GLUCOSE BLDC GLUCOMTR-MCNC: 181 MG/DL — HIGH (ref 70–99)
GLUCOSE BLDC GLUCOMTR-MCNC: 212 MG/DL — HIGH (ref 70–99)
GLUCOSE SERPL-MCNC: 225 MG/DL — HIGH (ref 70–99)
HCT VFR BLD CALC: 41 % — SIGNIFICANT CHANGE UP (ref 39–50)
HCV AB S/CO SERPL IA: 15.27 S/CO — HIGH (ref 0–0.99)
HCV AB SERPL-IMP: REACTIVE
HGB BLD-MCNC: 14.4 G/DL — SIGNIFICANT CHANGE UP (ref 13–17)
IMM GRANULOCYTES NFR BLD AUTO: 0.3 % — SIGNIFICANT CHANGE UP (ref 0–1.5)
INR BLD: 1.26 RATIO — HIGH (ref 0.88–1.16)
LACTATE SERPL-SCNC: 1.1 MMOL/L — SIGNIFICANT CHANGE UP (ref 0.5–2)
LIDOCAIN IGE QN: 71 U/L — HIGH (ref 22–51)
LYMPHOCYTES # BLD AUTO: 2.15 K/UL — SIGNIFICANT CHANGE UP (ref 1–3.3)
LYMPHOCYTES # BLD AUTO: 37.4 % — SIGNIFICANT CHANGE UP (ref 13–44)
MAGNESIUM SERPL-MCNC: 2.1 MG/DL — SIGNIFICANT CHANGE UP (ref 1.6–2.6)
MCHC RBC-ENTMCNC: 30.8 PG — SIGNIFICANT CHANGE UP (ref 27–34)
MCHC RBC-ENTMCNC: 35.1 GM/DL — SIGNIFICANT CHANGE UP (ref 32–36)
MCV RBC AUTO: 87.6 FL — SIGNIFICANT CHANGE UP (ref 80–100)
MONOCYTES # BLD AUTO: 0.72 K/UL — SIGNIFICANT CHANGE UP (ref 0–0.9)
MONOCYTES NFR BLD AUTO: 12.5 % — SIGNIFICANT CHANGE UP (ref 2–14)
NEUTROPHILS # BLD AUTO: 2.65 K/UL — SIGNIFICANT CHANGE UP (ref 1.8–7.4)
NEUTROPHILS NFR BLD AUTO: 46.2 % — SIGNIFICANT CHANGE UP (ref 43–77)
PHOSPHATE SERPL-MCNC: 2 MG/DL — LOW (ref 2.4–4.7)
PLATELET # BLD AUTO: 276 K/UL — SIGNIFICANT CHANGE UP (ref 150–400)
POTASSIUM SERPL-MCNC: 3.7 MMOL/L — SIGNIFICANT CHANGE UP (ref 3.5–5.3)
POTASSIUM SERPL-SCNC: 3.7 MMOL/L — SIGNIFICANT CHANGE UP (ref 3.5–5.3)
PROT SERPL-MCNC: 6.6 G/DL — SIGNIFICANT CHANGE UP (ref 6.6–8.7)
PROTHROM AB SERPL-ACNC: 14.3 SEC — HIGH (ref 10–12.9)
RBC # BLD: 4.68 M/UL — SIGNIFICANT CHANGE UP (ref 4.2–5.8)
RBC # FLD: 13.2 % — SIGNIFICANT CHANGE UP (ref 10.3–14.5)
SODIUM SERPL-SCNC: 138 MMOL/L — SIGNIFICANT CHANGE UP (ref 135–145)
WBC # BLD: 5.75 K/UL — SIGNIFICANT CHANGE UP (ref 3.8–10.5)
WBC # FLD AUTO: 5.75 K/UL — SIGNIFICANT CHANGE UP (ref 3.8–10.5)

## 2020-04-26 PROCEDURE — 82962 GLUCOSE BLOOD TEST: CPT

## 2020-04-26 PROCEDURE — 85610 PROTHROMBIN TIME: CPT

## 2020-04-26 PROCEDURE — 82248 BILIRUBIN DIRECT: CPT

## 2020-04-26 PROCEDURE — 82150 ASSAY OF AMYLASE: CPT

## 2020-04-26 PROCEDURE — 83036 HEMOGLOBIN GLYCOSYLATED A1C: CPT

## 2020-04-26 PROCEDURE — 99233 SBSQ HOSP IP/OBS HIGH 50: CPT

## 2020-04-26 PROCEDURE — 99285 EMERGENCY DEPT VISIT HI MDM: CPT | Mod: 25

## 2020-04-26 PROCEDURE — 36415 COLL VENOUS BLD VENIPUNCTURE: CPT

## 2020-04-26 PROCEDURE — 74330 X-RAY BILE/PANC ENDOSCOPY: CPT

## 2020-04-26 PROCEDURE — 87521 HEPATITIS C PROBE&RVRS TRNSC: CPT

## 2020-04-26 PROCEDURE — G0378: CPT

## 2020-04-26 PROCEDURE — 74177 CT ABD & PELVIS W/CONTRAST: CPT

## 2020-04-26 PROCEDURE — 85027 COMPLETE CBC AUTOMATED: CPT

## 2020-04-26 PROCEDURE — 86803 HEPATITIS C AB TEST: CPT

## 2020-04-26 PROCEDURE — 99239 HOSP IP/OBS DSCHRG MGMT >30: CPT

## 2020-04-26 PROCEDURE — 80053 COMPREHEN METABOLIC PANEL: CPT

## 2020-04-26 PROCEDURE — 83605 ASSAY OF LACTIC ACID: CPT

## 2020-04-26 PROCEDURE — 83690 ASSAY OF LIPASE: CPT

## 2020-04-26 PROCEDURE — 74181 MRI ABDOMEN W/O CONTRAST: CPT

## 2020-04-26 PROCEDURE — 83735 ASSAY OF MAGNESIUM: CPT

## 2020-04-26 PROCEDURE — 96366 THER/PROPH/DIAG IV INF ADDON: CPT

## 2020-04-26 PROCEDURE — 96375 TX/PRO/DX INJ NEW DRUG ADDON: CPT

## 2020-04-26 PROCEDURE — 96376 TX/PRO/DX INJ SAME DRUG ADON: CPT

## 2020-04-26 PROCEDURE — 80076 HEPATIC FUNCTION PANEL: CPT

## 2020-04-26 PROCEDURE — 96365 THER/PROPH/DIAG IV INF INIT: CPT

## 2020-04-26 PROCEDURE — 84100 ASSAY OF PHOSPHORUS: CPT

## 2020-04-26 PROCEDURE — 71045 X-RAY EXAM CHEST 1 VIEW: CPT

## 2020-04-26 PROCEDURE — 96367 TX/PROPH/DG ADDL SEQ IV INF: CPT

## 2020-04-26 RX ORDER — SODIUM,POTASSIUM PHOSPHATES 278-250MG
1 POWDER IN PACKET (EA) ORAL
Qty: 16 | Refills: 0
Start: 2020-04-26 | End: 2020-04-29

## 2020-04-26 RX ORDER — SODIUM,POTASSIUM PHOSPHATES 278-250MG
1 POWDER IN PACKET (EA) ORAL
Refills: 0 | Status: DISCONTINUED | OUTPATIENT
Start: 2020-04-26 | End: 2020-04-26

## 2020-04-26 RX ORDER — ONDANSETRON 8 MG/1
1 TABLET, FILM COATED ORAL
Qty: 15 | Refills: 0
Start: 2020-04-26 | End: 2020-04-30

## 2020-04-26 RX ADMIN — Medication 100 MILLIGRAM(S): at 05:12

## 2020-04-26 RX ADMIN — HEPARIN SODIUM 5000 UNIT(S): 5000 INJECTION INTRAVENOUS; SUBCUTANEOUS at 05:15

## 2020-04-26 RX ADMIN — Medication 12.5 MILLIGRAM(S): at 05:17

## 2020-04-26 RX ADMIN — Medication 100 MILLIGRAM(S): at 05:19

## 2020-04-26 RX ADMIN — Medication 200 MILLIGRAM(S): at 06:28

## 2020-04-26 RX ADMIN — Medication 2: at 11:43

## 2020-04-26 RX ADMIN — AMLODIPINE BESYLATE 10 MILLIGRAM(S): 2.5 TABLET ORAL at 05:17

## 2020-04-26 RX ADMIN — Medication 4: at 08:26

## 2020-04-26 RX ADMIN — VALSARTAN 320 MILLIGRAM(S): 80 TABLET ORAL at 05:17

## 2020-04-26 NOTE — PROGRESS NOTE ADULT - SUBJECTIVE AND OBJECTIVE BOX
INTERVAL HPI/OVERNIGHT EVENTS: S/p MRCP yesterday unremarkable for retained stone/sludge. Reviewed MR and CT imaging with radiology yesterday focus of air seen on CT was likely pneumobilia which was seen layering anteriorly due to the contrast in the CBD. Diet was advanced by the medical team last night and he tolerated it well. He denies any abdominal pain. No fever or chills. Eager to go home.     ROS wnl     PAST MEDICAL & SURGICAL HISTORY:  Lung nodule  Murmur  Chronic neck pain  Hepatitis C: treated 2014  Hypertension  Diabetes: history of retinal hemorrhage  H/O hernia repair: 1985  S/P appendectomy: 1974,  right shoulder dislocation 1962  H/O neck surgery: PCDF 1992,  ACDF 1993      Home Medications:  amlodipine-valsartan 10 mg-320 mg oral tablet: 1 tab(s) orally once a day (25 Apr 2020 13:28)  hydroCHLOROthiazide 12.5 mg oral tablet: 1 tab(s) orally once a day (25 Apr 2020 13:28)  methIMAzole 5 mg oral tablet: orally every 48 hours (25 Apr 2020 13:28)  NovoLIN 70/30 PenFill:   once a day (25 Apr 2020 13:28)  oxyCODONE 10 mg oral tablet: 1 tab(s) orally every 6 hours, As Needed (25 Apr 2020 13:28)  Toprol- mg oral tablet, extended release: 1 tab(s) orally once a day (25 Apr 2020 13:28)  Trulicity Pen 0.75 mg/0.5 mL subcutaneous solution: subcutaneous every 7 days (25 Apr 2020 13:28)      MEDICATIONS  (STANDING):  amLODIPine   Tablet 10 milliGRAM(s) Oral daily  aspirin enteric coated 81 milliGRAM(s) Oral daily  ciprofloxacin   IVPB 400 milliGRAM(s) IV Intermittent every 12 hours  dextrose 5%. 1000 milliLiter(s) (50 mL/Hr) IV Continuous <Continuous>  dextrose 5%. 1000 milliLiter(s) (50 mL/Hr) IV Continuous <Continuous>  dextrose 50% Injectable 12.5 Gram(s) IV Push once  dextrose 50% Injectable 25 Gram(s) IV Push once  dextrose 50% Injectable 25 Gram(s) IV Push once  dextrose 50% Injectable 12.5 Gram(s) IV Push once  dextrose 50% Injectable 25 Gram(s) IV Push once  dextrose 50% Injectable 25 Gram(s) IV Push once  heparin   Injectable 5000 Unit(s) SubCutaneous every 8 hours  hydrochlorothiazide 12.5 milliGRAM(s) Oral daily  insulin lispro (HumaLOG) corrective regimen sliding scale   SubCutaneous three times a day before meals  insulin lispro (HumaLOG) corrective regimen sliding scale   SubCutaneous at bedtime  insulin lispro (HumaLOG) corrective regimen sliding scale   SubCutaneous three times a day before meals  insulin NPH human recombinant 4 Unit(s) SubCutaneous at bedtime  lactated ringers. 1000 milliLiter(s) (200 mL/Hr) IV Continuous <Continuous>  methimazole 5 milliGRAM(s) Oral daily  metoprolol succinate  milliGRAM(s) Oral daily  metroNIDAZOLE  IVPB      metroNIDAZOLE  IVPB 500 milliGRAM(s) IV Intermittent every 8 hours  potassium acid phosphate/sodium acid phosphate tablet (K-PHOS No. 2) 1 Tablet(s) Oral four times a day with meals  sodium chloride 0.9%. 1000 milliLiter(s) (125 mL/Hr) IV Continuous <Continuous>  valsartan 320 milliGRAM(s) Oral daily    MEDICATIONS  (PRN):  acetaminophen   Tablet .. 650 milliGRAM(s) Oral every 6 hours PRN Temp greater or equal to 38C (100.4F), Mild Pain (1 - 3)  dextrose 40% Gel 15 Gram(s) Oral once PRN Blood Glucose LESS THAN 70 milliGRAM(s)/deciliter  dextrose 40% Gel 15 Gram(s) Oral once PRN Blood Glucose LESS THAN 70 milliGRAM(s)/deciliter  glucagon  Injectable 1 milliGRAM(s) IntraMuscular once PRN Glucose LESS THAN 70 milligrams/deciliter  glucagon  Injectable 1 milliGRAM(s) IntraMuscular once PRN Glucose LESS THAN 70 milligrams/deciliter  morphine  - Injectable 4 milliGRAM(s) IV Push every 6 hours PRN Severe Pain (7 - 10)  ondansetron Injectable 4 milliGRAM(s) IV Push every 6 hours PRN Nausea      Allergies    No Known Allergies    Intolerances          PHYSICAL EXAM:   Vital Signs:  Vital Signs Last 24 Hrs  T(C): 37.2 (26 Apr 2020 04:05), Max: 37.2 (26 Apr 2020 04:05)  T(F): 98.9 (26 Apr 2020 04:05), Max: 98.9 (26 Apr 2020 04:05)  HR: 73 (26 Apr 2020 04:05) (73 - 79)  BP: 118/66 (26 Apr 2020 04:05) (118/66 - 152/75)  BP(mean): --  RR: 17 (26 Apr 2020 04:05) (17 - 18)  SpO2: 96% (26 Apr 2020 04:05) (96% - 97%)  Daily     Daily     GENERAL:  no distress  HEENT:  NC/AT,  anicteric  ABDOMEN:  Soft, non-tender, non-distended  EXTREMITIES  no cyanosis      LABS:                        14.4   5.75  )-----------( 276      ( 26 Apr 2020 07:14 )             41.0       Hemoglobin: 14.4 g/dL (04-26-20 @ 07:14)  Hemoglobin: 15.5 g/dL (04-24-20 @ 18:00)      04-26    138  |  103  |  11.0  ----------------------------<  225<H>  3.7   |  22.0  |  0.60    Ca    8.4<L>      26 Apr 2020 07:14  Phos  2.0     04-26  Mg     2.1     04-26    TPro  6.6  /  Alb  3.8  /  TBili  2.9<H>  /  DBili  1.7<H>  /  AST  267<H>  /  ALT  241<H>  /  AlkPhos  139<H>  04-26      INR: 1.26 ratio (04-26-20 @ 07:14)      Alanine Aminotransferase (ALT/SGPT): 241 U/L (04-26-20 @ 07:14)  Alkaline Phosphatase, Serum: 139 U/L (04-26-20 @ 07:14)  Aspartate Aminotransferase (AST/SGOT): 267 U/L (04-26-20 @ 07:14)  Bilirubin Direct, Serum: 1.7 mg/dL (04-26-20 @ 07:14)  Alanine Aminotransferase (ALT/SGPT): 241 U/L (04-26-20 @ 07:14)  Alkaline Phosphatase, Serum: 139 U/L (04-26-20 @ 07:14)  Aspartate Aminotransferase (AST/SGOT): 267 U/L (04-26-20 @ 07:14)  Bilirubin Direct, Serum: 0.4 mg/dL (04-25-20 @ 08:28)  Alkaline Phosphatase, Serum: 99 U/L (04-25-20 @ 08:28)  Aspartate Aminotransferase (AST/SGOT): 48 U/L (04-25-20 @ 08:28)  Alanine Aminotransferase (ALT/SGPT): 66 U/L (04-25-20 @ 08:28)  Alanine Aminotransferase (ALT/SGPT): 62 U/L (04-24-20 @ 18:00)  Aspartate Aminotransferase (AST/SGOT): 82 U/L (04-24-20 @ 18:00)  Alkaline Phosphatase, Serum: 111 U/L (04-24-20 @ 18:00)      RADIOLOGY & ADDITIONAL TESTS:  < from: MR MRCP No Cont (04.25.20 @ 09:57) >   EXAM:  MR MRCP                          PROCEDURE DATE:  04/25/2020          INTERPRETATION:  Clinical Information:  Status post ERCP with removal of stone now with elevated bilirubin and retained contrast.    Comparison:CT scan of the abdomen pelvis dated 4/24/2020     Procedure: Noncontrast evaluation of the abdomen was performed. 3D MRCP was also performed. Image quality is satisfactory.    Findings:    The liver demonstrates normal signal.    There is no ascites.    There is mild intrahepatic biliary ductal dilatation. Common bile duct measures up to 6 mm. No evidence of choledocholithiasis.    The gallbladder is normal.    The pancreas has normal signal intensity.  The pancreatic duct is normal in caliber.    The spleen is normal.      The adrenal glands are normal.      The kidneys are normal in size.      Lymph nodes are normal.        IMPRESSION: Mild intr    < end of copied text >

## 2020-04-26 NOTE — DISCHARGE NOTE PROVIDER - CARE PROVIDERS DIRECT ADDRESSES
[email or web address]

## 2020-04-26 NOTE — DISCHARGE NOTE PROVIDER - NSDCMRMEDTOKEN_GEN_ALL_CORE_FT
amlodipine-valsartan 10 mg-320 mg oral tablet: 1 tab(s) orally once a day  hydroCHLOROthiazide 12.5 mg oral tablet: 1 tab(s) orally once a day  methIMAzole 5 mg oral tablet: orally every 48 hours  NovoLIN 70/30 PenFill:   once a day  ondansetron 4 mg oral tablet: 1 tab(s) orally 3 times a day, As Needed   oxyCODONE 10 mg oral tablet: 1 tab(s) orally every 6 hours, As Needed  potassium phosphate-sodium phosphate 305 mg-700 mg oral tablet: 1 tab(s) orally 4 times a day (with meals and at bedtime)  Toprol- mg oral tablet, extended release: 1 tab(s) orally once a day  Trulicity Pen 0.75 mg/0.5 mL subcutaneous solution: subcutaneous every 7 days

## 2020-04-26 NOTE — DISCHARGE NOTE PROVIDER - NSDCCPCAREPLAN_GEN_ALL_CORE_FT
PRINCIPAL DISCHARGE DIAGNOSIS  Diagnosis: Pancreatitis  Assessment and Plan of Treatment:       SECONDARY DISCHARGE DIAGNOSES  Diagnosis: Elevated liver enzymes  Assessment and Plan of Treatment:

## 2020-04-26 NOTE — DISCHARGE NOTE NURSING/CASE MANAGEMENT/SOCIAL WORK - PATIENT PORTAL LINK FT
You can access the FollowMyHealth Patient Portal offered by [facility] by registering at the following website: [email or web address]. By joining [employer]’s FollowMyHealth portal, you will also be able to view your health information using other applications (apps) compatible with our system.

## 2020-04-26 NOTE — DISCHARGE NOTE PROVIDER - HOSPITAL COURSE
65 year old male w DM, hemochromatosis, HCV w chronic liver disease + cirrhosis, HTN presented to ED 04-24 c/o constant right sided stabbing pain s/p ERCP earlier, he was admitted under medicine for further management of Acute pancreatitis, his pancreatitis was likely from Post ERCP, he was initially kept NPO, was given zofran prn and morphine prn    along with IVFs, his symptoms started improving, seen by GI, patient had CT abdominal CT with showed contrast material introduced at the time of ERCP examination is identified within the gallbladder lumen as well as the intrahepatic and extrahepatic biliary tree, There is a tiny focus of air attenuation identified anterior to the proximal common bile duct within the faby hepatis (series 3, image 47) which may represent a small focus of extraluminal air. No additional foci of air attenuation are identified in the region of the faby hepatis or within the intraperitoneal space.    Patient also had MRCP showed Mild intrahepatic biliary ductal dilatation. Normal caliber common bile duct without evidence of choledocholithiasis, patient was seen and followed by GI, initially he was given IV cipro /flagyl, then d/elizabeth, he was started on diet, he tolerated it well, his Lipase and amylase trended down, he was noted to have rise in the Liver enzymes, he has Hx of Hemochromatosis, HCV associated liver disease, repeat later again today, slightly better, seen by GI recommended follow up as out patient in few days to repeat LFTs.     He has Hx of DM, his Hb a1c was noted to be 6.7, he wanted to see endocrinologist as out patient, he has been provided info.     Patient has Hx of HTN, he was continued with amlodipine 10 mg, valsartan 320 mg, HCTZ 12.5 and toprol 100 mg, he was continued with methimazole 5 mg q 48 hrs for his Hx of Hyperthyroidism.         Vital Signs Last 24 Hrs    T(C): 36.8 (26 Apr 2020 11:26), Max: 37.2 (26 Apr 2020 04:05)    T(F): 98.3 (26 Apr 2020 11:26), Max: 98.9 (26 Apr 2020 04:05)    HR: 71 (26 Apr 2020 11:26) (71 - 79)    BP: 121/71 (26 Apr 2020 11:26) (118/66 - 152/75)    RR: 18 (26 Apr 2020 11:26) (17 - 18)    SpO2: 99% (26 Apr 2020 11:26) (96% - 99%)        PHYSICAL EXAM:        GENERAL: Middle age male looking comfortable    HEENT: PERRL, +EOMI    NECK: soft, Supple, No JVD,     CHEST/LUNG: Clear to auscultate bilaterally; No wheezing    HEART: S1S2+, Regular rate and rhythm; No murmurs    ABDOMEN: Soft, Nontender, Nondistended; Bowel sounds present    EXTREMITIES:  2+ Peripheral Pulses, No edema    SKIN: No rashes or lesions    NEURO: AAOX3, no focal deficits, no motor r sensory loss    PSYCH: normal mood        Total time spent 36minutes

## 2020-04-26 NOTE — PROGRESS NOTE ADULT - ASSESSMENT
65y man with hx of HEP C presented with choledocholithiasis s/p ERCP with stone removal. He developed pain Friday evening. Mild post ERCP pancreatitis. Imaging reviewed with radiology.  Diet advanced yesterday and patient tolerated it well.   monitor LFTs will repeat in 3 days as out patient  d/c antibiotics no evidence of retained stone/sludge    Thanks 65y man with hx of HEP C presented with choledocholithiasis s/p ERCP with stone removal. He developed pain Friday evening. Mild post ERCP pancreatitis. Imaging reviewed with radiology.  Diet advanced yesterday and patient tolerated it well.   monitor LFTs will repeat later today  d/c antibiotics no evidence of retained stone/sludge  possible d/c today if LFTs improved    Thanks

## 2020-04-26 NOTE — DISCHARGE NOTE PROVIDER - NSDCFUADDAPPT_GEN_ALL_CORE_FT
Please call GI doctors office to get an appointment to get your Liver function checked   take low fat diet

## 2020-04-26 NOTE — DISCHARGE NOTE PROVIDER - CARE PROVIDER_API CALL
Henry Solo)  Gastroenterology; Internal Medicine  [address], [address]  [place] [address]  Phone: [phone]  Fax: [phone]  Follow Up Time:     Antionette Arteaga)  EndocrinologyMetabDiabetes  [address], [place] [id number]  Phone: [phone]  Fax: [phone]  Follow Up Time:

## 2020-04-27 LAB
HCV RNA FLD QL NAA+PROBE: SIGNIFICANT CHANGE UP
HCV RNA SPEC QL PROBE+SIG AMP: SIGNIFICANT CHANGE UP

## 2020-05-12 ENCOUNTER — APPOINTMENT (OUTPATIENT)
Dept: GASTROENTEROLOGY | Facility: CLINIC | Age: 65
End: 2020-05-12
Payer: MEDICARE

## 2020-05-12 ENCOUNTER — TRANSCRIPTION ENCOUNTER (OUTPATIENT)
Age: 65
End: 2020-05-12

## 2020-05-12 PROCEDURE — 99442: CPT | Mod: CR

## 2020-05-19 LAB
AFP-TM SERPL-MCNC: <1.8 NG/ML
AFP-TM SERPL-MCNC: <1.8 NG/ML
ALBUMIN SERPL ELPH-MCNC: 4.5 G/DL
ALBUMIN SERPL ELPH-MCNC: 4.6 G/DL
ALP BLD-CCNC: 80 U/L
ALP BLD-CCNC: 81 U/L
ALT SERPL-CCNC: 20 U/L
ALT SERPL-CCNC: 21 U/L
ANION GAP SERPL CALC-SCNC: 15 MMOL/L
ANION GAP SERPL CALC-SCNC: 15 MMOL/L
AST SERPL-CCNC: 16 U/L
AST SERPL-CCNC: 18 U/L
BASOPHILS # BLD AUTO: 0.03 K/UL
BASOPHILS NFR BLD AUTO: 0.5 %
BILIRUB SERPL-MCNC: 0.8 MG/DL
BILIRUB SERPL-MCNC: 0.8 MG/DL
BUN SERPL-MCNC: 17 MG/DL
BUN SERPL-MCNC: 17 MG/DL
CALCIUM SERPL-MCNC: 9.5 MG/DL
CALCIUM SERPL-MCNC: 9.8 MG/DL
CHLORIDE SERPL-SCNC: 105 MMOL/L
CHLORIDE SERPL-SCNC: 106 MMOL/L
CO2 SERPL-SCNC: 22 MMOL/L
CO2 SERPL-SCNC: 24 MMOL/L
CREAT SERPL-MCNC: 1.03 MG/DL
CREAT SERPL-MCNC: 1.07 MG/DL
EOSINOPHIL # BLD AUTO: 0.34 K/UL
EOSINOPHIL NFR BLD AUTO: 5.3 %
GLUCOSE SERPL-MCNC: 126 MG/DL
HCT VFR BLD CALC: 42.4 %
HGB BLD-MCNC: 14.5 G/DL
IMM GRANULOCYTES NFR BLD AUTO: 0.3 %
INR PPP: 1.18 RATIO
INR PPP: 1.2 RATIO
LYMPHOCYTES # BLD AUTO: 2.04 K/UL
LYMPHOCYTES NFR BLD AUTO: 31.7 %
MAN DIFF?: NORMAL
MCHC RBC-ENTMCNC: 30.6 PG
MCHC RBC-ENTMCNC: 34.2 GM/DL
MCV RBC AUTO: 89.5 FL
MONOCYTES # BLD AUTO: 0.78 K/UL
MONOCYTES NFR BLD AUTO: 12.1 %
NEUTROPHILS # BLD AUTO: 3.22 K/UL
NEUTROPHILS NFR BLD AUTO: 50.1 %
PLATELET # BLD AUTO: 370 K/UL
POTASSIUM SERPL-SCNC: 4.3 MMOL/L
POTASSIUM SERPL-SCNC: 4.4 MMOL/L
PROT SERPL-MCNC: 7.2 G/DL
PROT SERPL-MCNC: 7.2 G/DL
PT BLD: 13.5 SEC
PT BLD: 13.7 SEC
RBC # BLD: 4.74 M/UL
RBC # FLD: 12.8 %
SODIUM SERPL-SCNC: 143 MMOL/L
SODIUM SERPL-SCNC: 144 MMOL/L
WBC # FLD AUTO: 6.43 K/UL

## 2020-06-10 ENCOUNTER — APPOINTMENT (OUTPATIENT)
Dept: HEPATOLOGY | Facility: CLINIC | Age: 65
End: 2020-06-10
Payer: MEDICARE

## 2020-06-10 PROCEDURE — 91200 LIVER ELASTOGRAPHY: CPT

## 2020-06-11 ENCOUNTER — APPOINTMENT (OUTPATIENT)
Dept: HEPATOLOGY | Facility: CLINIC | Age: 65
End: 2020-06-11
Payer: MEDICARE

## 2020-06-11 PROCEDURE — 99214 OFFICE O/P EST MOD 30 MIN: CPT | Mod: 95

## 2020-06-19 ENCOUNTER — APPOINTMENT (OUTPATIENT)
Dept: CARDIOLOGY | Facility: CLINIC | Age: 65
End: 2020-06-19
Payer: MEDICARE

## 2020-06-19 PROCEDURE — 93306 TTE W/DOPPLER COMPLETE: CPT

## 2020-06-22 ENCOUNTER — APPOINTMENT (OUTPATIENT)
Dept: CARDIOLOGY | Facility: CLINIC | Age: 65
End: 2020-06-22
Payer: MEDICARE

## 2020-06-22 VITALS
SYSTOLIC BLOOD PRESSURE: 130 MMHG | HEART RATE: 92 BPM | WEIGHT: 172 LBS | DIASTOLIC BLOOD PRESSURE: 80 MMHG | OXYGEN SATURATION: 98 % | BODY MASS INDEX: 28.66 KG/M2 | HEIGHT: 65 IN

## 2020-06-22 PROCEDURE — 99214 OFFICE O/P EST MOD 30 MIN: CPT

## 2020-07-04 ENCOUNTER — APPOINTMENT (OUTPATIENT)
Dept: DISASTER EMERGENCY | Facility: CLINIC | Age: 65
End: 2020-07-04

## 2020-07-04 DIAGNOSIS — Z01.818 ENCOUNTER FOR OTHER PREPROCEDURAL EXAMINATION: ICD-10-CM

## 2020-07-05 LAB — SARS-COV-2 N GENE NPH QL NAA+PROBE: NOT DETECTED

## 2020-07-07 ENCOUNTER — OUTPATIENT (OUTPATIENT)
Dept: OUTPATIENT SERVICES | Facility: HOSPITAL | Age: 65
LOS: 1 days | End: 2020-07-07

## 2020-07-07 DIAGNOSIS — Z98.89 OTHER SPECIFIED POSTPROCEDURAL STATES: Chronic | ICD-10-CM

## 2020-07-20 ENCOUNTER — OUTPATIENT (OUTPATIENT)
Dept: OUTPATIENT SERVICES | Facility: HOSPITAL | Age: 65
LOS: 1 days | End: 2020-07-20

## 2020-07-20 DIAGNOSIS — Z98.89 OTHER SPECIFIED POSTPROCEDURAL STATES: Chronic | ICD-10-CM

## 2020-08-15 ENCOUNTER — APPOINTMENT (OUTPATIENT)
Dept: DISASTER EMERGENCY | Facility: CLINIC | Age: 65
End: 2020-08-15

## 2020-08-16 LAB — SARS-COV-2 N GENE NPH QL NAA+PROBE: NOT DETECTED

## 2020-08-18 ENCOUNTER — OUTPATIENT (OUTPATIENT)
Dept: OUTPATIENT SERVICES | Facility: HOSPITAL | Age: 65
LOS: 1 days | End: 2020-08-18

## 2020-08-18 DIAGNOSIS — Z98.89 OTHER SPECIFIED POSTPROCEDURAL STATES: Chronic | ICD-10-CM

## 2020-10-05 ENCOUNTER — OUTPATIENT (OUTPATIENT)
Dept: OUTPATIENT SERVICES | Facility: HOSPITAL | Age: 65
LOS: 1 days | End: 2020-10-05

## 2020-10-05 DIAGNOSIS — Z98.89 OTHER SPECIFIED POSTPROCEDURAL STATES: Chronic | ICD-10-CM

## 2020-10-07 ENCOUNTER — LABORATORY RESULT (OUTPATIENT)
Age: 65
End: 2020-10-07

## 2020-10-07 ENCOUNTER — OUTPATIENT (OUTPATIENT)
Dept: OUTPATIENT SERVICES | Facility: HOSPITAL | Age: 65
LOS: 1 days | End: 2020-10-07

## 2020-10-07 DIAGNOSIS — Z98.89 OTHER SPECIFIED POSTPROCEDURAL STATES: Chronic | ICD-10-CM

## 2020-10-08 LAB — AFP-TM SERPL-MCNC: <1.8 NG/ML

## 2020-10-28 ENCOUNTER — APPOINTMENT (OUTPATIENT)
Dept: ULTRASOUND IMAGING | Facility: CLINIC | Age: 65
End: 2020-10-28
Payer: MEDICARE

## 2020-10-28 PROCEDURE — 76700 US EXAM ABDOM COMPLETE: CPT

## 2020-10-28 PROCEDURE — 76536 US EXAM OF HEAD AND NECK: CPT

## 2020-12-24 ENCOUNTER — APPOINTMENT (OUTPATIENT)
Dept: HEPATOLOGY | Facility: CLINIC | Age: 65
End: 2020-12-24

## 2020-12-28 ENCOUNTER — APPOINTMENT (OUTPATIENT)
Dept: CARDIOTHORACIC SURGERY | Facility: CLINIC | Age: 65
End: 2020-12-28
Payer: MEDICARE

## 2020-12-28 VITALS
OXYGEN SATURATION: 98 % | TEMPERATURE: 97.1 F | DIASTOLIC BLOOD PRESSURE: 80 MMHG | HEIGHT: 66 IN | BODY MASS INDEX: 30.86 KG/M2 | HEART RATE: 87 BPM | SYSTOLIC BLOOD PRESSURE: 142 MMHG | WEIGHT: 192 LBS

## 2020-12-28 PROCEDURE — 99205 OFFICE O/P NEW HI 60 MIN: CPT

## 2020-12-28 RX ORDER — METOPROLOL SUCCINATE 50 MG/1
50 TABLET, EXTENDED RELEASE ORAL
Qty: 90 | Refills: 0 | Status: DISCONTINUED | COMMUNITY
Start: 2017-12-05 | End: 2020-12-28

## 2020-12-28 RX ORDER — IBUPROFEN 600 MG/1
600 TABLET, FILM COATED ORAL
Qty: 21 | Refills: 0 | Status: DISCONTINUED | COMMUNITY
Start: 2018-04-06 | End: 2020-12-28

## 2020-12-28 NOTE — CONSULT LETTER
[Dear  ___] : Dear  [unfilled], APER [FreeTextEntry2] : Adam marshall MD [FreeTextEntry3] : Wellington Islas MD\par  of Cardiovascular & Thoracic Surgery\par Associated Professor \par Cardiovascular & Thoracic Surgery\par [employer] [employer]\par \par [facility] \par [address] \[place] [address]\par Tel   [phone]\par Fax  [phone]\par  [DrEvelyn  ___] : Dr. SUERO

## 2020-12-28 NOTE — HISTORY OF PRESENT ILLNESS
[FreeTextEntry1] : Mr. Palmer is a 65 year old male referred by Dr. Desouza for initial evaluation of Severe Mitral Regurgitation. He has a a past medical history significant for Hypertension, Hyperlipidemia, Diabetes Mellitus, HCV s/p treatment and Cardiac Murmur. He denies shortness of breath, chest pain or leg edema. He has known Hepatitis C and Cirrhosis and is seen by Dr. Nelson.

## 2020-12-28 NOTE — ASSESSMENT
[FreeTextEntry1] : Mr. Palmer is a 65 year old male with severe, asymptomatic mitral regurgitation. Given his lack of symptoms and Cirrhotic liver at this time he needs no intervention. i will see him in three months. Should he have any symptoms he will return earlier at which point he will be considered for mitral clipping. \par \par \par I thank you for the opportunity to participate in the care of your patients. Please do not hesitate to contact me should you have any questions.\par

## 2020-12-28 NOTE — PHYSICAL EXAM
[General Appearance - Alert] : alert [General Appearance - In No Acute Distress] : in no acute distress [General Appearance - Well Nourished] : well nourished [General Appearance - Well Developed] : well developed [Sclera] : the sclera and conjunctiva were normal [Outer Ear] : the ears and nose were normal in appearance [Neck Appearance] : the appearance of the neck was normal [Neck Cervical Mass (___cm)] : no neck mass was observed [] : no respiratory distress [Exaggerated Use Of Accessory Muscles For Inspiration] : no accessory muscle use [Apical Impulse] : the apical impulse was normal [Heart Sounds] : normal S1 and S2 [FreeTextEntry1] : III/VI systolic murmru [Examination Of The Chest] : the chest was normal in appearance [Arterial Pulses Carotid] : carotid pulses were normal with no bruits [Arterial Pulses Femoral] : femoral pulses were normal without bruits [Edema] : there was no peripheral edema [Bowel Sounds] : normal bowel sounds [Abdomen Soft] : soft [Cervical Lymph Nodes Enlarged Posterior Bilaterally] : posterior cervical [No CVA Tenderness] : no ~M costovertebral angle tenderness [Abnormal Walk] : normal gait [Nail Clubbing] : no clubbing  or cyanosis of the fingernails [Cranial Nerves] : cranial nerves 2-12 were intact [Sensation] : the sensory exam was normal to light touch and pinprick [Oriented To Time, Place, And Person] : oriented to person, place, and time [Affect] : the affect was normal

## 2020-12-28 NOTE — DATA REVIEWED
[FreeTextEntry1] : Transthoracic Echocardiogram performed on 06/19/2020 revealed\par Mitral Valve prolapse involving the posterior mitral leaflet with calcified chordae. Severe Eccentric Mitral regurgitation.\par thickening aortic valve with calcified non coronary cusp\par mildly dilated left atrium. LA volume index= 36 cc/ms\par normal left ventricular internal dimensions and wall thicknesses.\par normal left ventricular systolic function. \par moderate diastolic dysfunction (stage II)\par Aortic root 3.8 cm\par ascending aorta 3.4 cm

## 2021-01-04 ENCOUNTER — OUTPATIENT (OUTPATIENT)
Dept: OUTPATIENT SERVICES | Facility: HOSPITAL | Age: 66
LOS: 1 days | End: 2021-01-04

## 2021-01-04 DIAGNOSIS — Z98.89 OTHER SPECIFIED POSTPROCEDURAL STATES: Chronic | ICD-10-CM

## 2021-01-06 ENCOUNTER — APPOINTMENT (OUTPATIENT)
Dept: HEPATOLOGY | Facility: CLINIC | Age: 66
End: 2021-01-06
Payer: MEDICARE

## 2021-01-06 VITALS
HEART RATE: 93 BPM | TEMPERATURE: 97.7 F | DIASTOLIC BLOOD PRESSURE: 77 MMHG | BODY MASS INDEX: 30.37 KG/M2 | SYSTOLIC BLOOD PRESSURE: 116 MMHG | RESPIRATION RATE: 16 BRPM | WEIGHT: 189 LBS | HEIGHT: 66 IN

## 2021-01-06 PROCEDURE — 99214 OFFICE O/P EST MOD 30 MIN: CPT

## 2021-01-26 ENCOUNTER — TRANSCRIPTION ENCOUNTER (OUTPATIENT)
Age: 66
End: 2021-01-26

## 2021-02-08 ENCOUNTER — APPOINTMENT (OUTPATIENT)
Dept: CARDIOLOGY | Facility: CLINIC | Age: 66
End: 2021-02-08
Payer: MEDICARE

## 2021-02-08 ENCOUNTER — NON-APPOINTMENT (OUTPATIENT)
Age: 66
End: 2021-02-08

## 2021-02-08 VITALS
BODY MASS INDEX: 30.37 KG/M2 | WEIGHT: 189 LBS | OXYGEN SATURATION: 98 % | SYSTOLIC BLOOD PRESSURE: 130 MMHG | TEMPERATURE: 97 F | HEART RATE: 94 BPM | HEIGHT: 66 IN | DIASTOLIC BLOOD PRESSURE: 84 MMHG

## 2021-02-08 PROCEDURE — 93000 ELECTROCARDIOGRAM COMPLETE: CPT

## 2021-02-08 PROCEDURE — 99214 OFFICE O/P EST MOD 30 MIN: CPT

## 2021-02-08 RX ORDER — ASPIRIN ENTERIC COATED TABLETS 81 MG 81 MG/1
81 TABLET, DELAYED RELEASE ORAL DAILY
Qty: 30 | Refills: 0 | Status: ACTIVE | COMMUNITY
Start: 2021-02-08

## 2021-02-08 RX ORDER — INSULIN ADMIN. SUPPLIES
30G X 8 MM INSULIN PEN (EA) SUBCUTANEOUS
Qty: 100 | Refills: 0 | Status: DISCONTINUED | COMMUNITY
Start: 2017-07-07 | End: 2021-02-08

## 2021-02-08 RX ORDER — FLASH GLUCOSE SCANNING READER
EACH MISCELLANEOUS
Qty: 1 | Refills: 0 | Status: ACTIVE | COMMUNITY
Start: 2021-01-06

## 2021-02-08 RX ORDER — FLASH GLUCOSE SENSOR
KIT MISCELLANEOUS
Qty: 2 | Refills: 0 | Status: ACTIVE | COMMUNITY
Start: 2021-01-06

## 2021-02-08 RX ORDER — DULAGLUTIDE 0.75 MG/.5ML
0.75 INJECTION, SOLUTION SUBCUTANEOUS
Refills: 0 | Status: DISCONTINUED | COMMUNITY
End: 2021-02-08

## 2021-02-08 RX ORDER — PEN NEEDLE, DIABETIC 32 GX 1/4"
32G X 6 MM NEEDLE, DISPOSABLE MISCELLANEOUS
Qty: 100 | Refills: 0 | Status: DISCONTINUED | COMMUNITY
Start: 2018-04-29 | End: 2021-02-08

## 2021-02-08 RX ORDER — INSULIN LISPRO 100 [IU]/ML
(75-25) 100 INJECTION, SUSPENSION SUBCUTANEOUS
Qty: 15 | Refills: 0 | Status: ACTIVE | COMMUNITY
Start: 2020-10-01

## 2021-02-08 RX ORDER — PEN NEEDLE, DIABETIC 29 G X1/2"
32G X 4 MM NEEDLE, DISPOSABLE MISCELLANEOUS
Qty: 100 | Refills: 0 | Status: ACTIVE | COMMUNITY
Start: 2020-02-28

## 2021-02-11 ENCOUNTER — LABORATORY RESULT (OUTPATIENT)
Age: 66
End: 2021-02-11

## 2021-02-13 ENCOUNTER — APPOINTMENT (OUTPATIENT)
Dept: DISASTER EMERGENCY | Facility: CLINIC | Age: 66
End: 2021-02-13

## 2021-02-14 LAB — SARS-COV-2 N GENE NPH QL NAA+PROBE: NOT DETECTED

## 2021-02-15 ENCOUNTER — APPOINTMENT (OUTPATIENT)
Dept: ULTRASOUND IMAGING | Facility: CLINIC | Age: 66
End: 2021-02-15
Payer: MEDICARE

## 2021-02-15 PROCEDURE — 93971 EXTREMITY STUDY: CPT | Mod: LT

## 2021-02-16 ENCOUNTER — OUTPATIENT (OUTPATIENT)
Dept: OUTPATIENT SERVICES | Facility: HOSPITAL | Age: 66
LOS: 1 days | End: 2021-02-16
Payer: MEDICARE

## 2021-02-16 DIAGNOSIS — Z98.89 OTHER SPECIFIED POSTPROCEDURAL STATES: Chronic | ICD-10-CM

## 2021-02-16 PROCEDURE — 93010 ELECTROCARDIOGRAM REPORT: CPT

## 2021-02-16 PROCEDURE — 92928 PRQ TCAT PLMT NTRAC ST 1 LES: CPT | Mod: RC

## 2021-02-16 PROCEDURE — 93571 IV DOP VEL&/PRESS C FLO 1ST: CPT | Mod: 26,52

## 2021-02-16 PROCEDURE — 93460 R&L HRT ART/VENTRICLE ANGIO: CPT | Mod: 26,XU

## 2021-02-17 PROCEDURE — 93010 ELECTROCARDIOGRAM REPORT: CPT

## 2021-02-19 ENCOUNTER — NON-APPOINTMENT (OUTPATIENT)
Age: 66
End: 2021-02-19

## 2021-02-19 ENCOUNTER — APPOINTMENT (OUTPATIENT)
Dept: CARDIOLOGY | Facility: CLINIC | Age: 66
End: 2021-02-19
Payer: MEDICARE

## 2021-02-19 VITALS
SYSTOLIC BLOOD PRESSURE: 150 MMHG | BODY MASS INDEX: 29.57 KG/M2 | WEIGHT: 184 LBS | TEMPERATURE: 97.3 F | OXYGEN SATURATION: 98 % | DIASTOLIC BLOOD PRESSURE: 90 MMHG | HEIGHT: 66 IN | HEART RATE: 93 BPM

## 2021-02-19 PROCEDURE — 99215 OFFICE O/P EST HI 40 MIN: CPT

## 2021-02-19 PROCEDURE — 93000 ELECTROCARDIOGRAM COMPLETE: CPT

## 2021-02-19 RX ORDER — ATORVASTATIN CALCIUM 80 MG/1
80 TABLET, FILM COATED ORAL
Qty: 90 | Refills: 3 | Status: ACTIVE | COMMUNITY
Start: 2021-02-08

## 2021-03-02 ENCOUNTER — APPOINTMENT (OUTPATIENT)
Dept: CARDIOTHORACIC SURGERY | Facility: CLINIC | Age: 66
End: 2021-03-02
Payer: MEDICARE

## 2021-03-02 DIAGNOSIS — Z86.79 PERSONAL HISTORY OF OTHER DISEASES OF THE CIRCULATORY SYSTEM: ICD-10-CM

## 2021-03-02 DIAGNOSIS — Z87.19 PERSONAL HISTORY OF OTHER DISEASES OF THE DIGESTIVE SYSTEM: ICD-10-CM

## 2021-03-02 PROCEDURE — 99214 OFFICE O/P EST MOD 30 MIN: CPT

## 2021-03-02 NOTE — PHYSICAL EXAM
[Sclera] : the sclera and conjunctiva were normal [Neck Appearance] : the appearance of the neck was normal [Exaggerated Use Of Accessory Muscles For Inspiration] : no accessory muscle use [Heart Rate And Rhythm] : heart rate was normal and rhythm regular [Examination Of The Chest] : the chest was normal in appearance [Chest Visual Inspection Thoracic Asymmetry] : no chest asymmetry [Abnormal Walk] : normal gait [] : no rash [Sensation] : the sensory exam was normal to light touch and pinprick [Motor Exam] : the motor exam was normal [Oriented To Time, Place, And Person] : oriented to person, place, and time

## 2021-03-03 PROBLEM — Z86.79 HISTORY OF CARDIAC MURMUR: Status: RESOLVED | Noted: 2021-03-03 | Resolved: 2021-03-03

## 2021-03-03 PROBLEM — Z87.19 HISTORY OF CIRRHOSIS: Status: RESOLVED | Noted: 2021-03-03 | Resolved: 2021-03-03

## 2021-03-03 PROBLEM — Z86.79 HISTORY OF HYPERTENSION: Status: RESOLVED | Noted: 2021-03-03 | Resolved: 2021-03-03

## 2021-03-05 ENCOUNTER — APPOINTMENT (OUTPATIENT)
Dept: CARDIOLOGY | Facility: CLINIC | Age: 66
End: 2021-03-05

## 2021-03-10 NOTE — REVIEW OF SYSTEMS
[Negative] : Heme/Lymph [As Noted in HPI] : as noted in HPI [FreeTextEntry5] : minimal shortness of breath on exertion  [FreeTextEntry6] : minimal shortness of breath with exertion

## 2021-03-10 NOTE — CONSULT LETTER
[Dear  ___] : Dear  [unfilled], [Please see my note below.] : Please see my note below. [Consult Closing:] : Thank you very much for allowing me to participate in the care of this patient.  If you have any questions, please do not hesitate to contact me. [Sincerely,] : Sincerely, [Consult Letter:] : I had the pleasure of evaluating your patient, [unfilled]. [FreeTextEntry2] : Adam Desouza MD [FreeTextEntry3] : Wellington Islas MD\par  of Cardiothoracic Surgery\par [facility]\par [address] [place] [address] \[place] [address]\par [phone]\par

## 2021-03-10 NOTE — DATA REVIEWED
[FreeTextEntry1] : Cardiac Catheterization from [facility] on 02/16/21\par - Obstructive mRCA lesion (eccentric and ulcerated) and iFR negative mLAD lesion. Proceed with PCI of the mRCA in preparation for MitraClip procedure as he has been previously turned down for open heart surgery. \par LM: Normal \par LAD: Normal \par MId LAD Diffuse 50% stenosis \par RCA: Normal \par Proximal RCA: Diffuse 30% stenosis \par Mid RCA : DIscrete 80% stenosis. The lesion was ulcerated and without evidence of  thrombus. MATTHEW 3 flow and large vascular territory distal to the lesion. Showed moderate atherosclerosis \par DIstal RCA moderate atherosclerosis \par Cx: Vessel large size. Minor luminal irregularity \par \par 7.19.2020 [facility] Transthoracic Echocardiogram \par -Mitral valve prolapse involving the posterior mitral leaflet with calcified chordae\par -Severe eccentric mitral regurgitation \par -Thickened aortic valve with calcified non coronary cusp \par -Mildly dilated left atrium.LA volume index 36 cc/m2 \par -Normal  left ventricular interna; systolic function. No segmental wall motion abnormalities \par -Moderate diastolic dysfunction (stage II \par -Note LVEF may be overestimated given severity of mitral valve regurgitation

## 2021-03-10 NOTE — ASSESSMENT
[FreeTextEntry1] : The patient's past medical history, past surgical history, family history, social history, allergies, medications, and multisystem review of systems were individually reviewed with the patient. The patient was personally seen and examined. Due to Mr. Palmer complex history of shortness of breath, Severe eccentric Mitral Regurgitation,   Cirrhosis and Hepatitis C treated concurrently by Dr. Nelson, I am highly recommending MItral Clip placement. Pre-operative education and post-operative activity reviewed.  All risks, benefits, and alternatives discussed at length with patient. All questions addressed. Patient would like to proceed with Mitral Clip placement as discussed. Prior to scheduling his Mitral Clip procedure we will obtain a Transesophageal Echocardiogram. Patient agrees and will adhere to the plan. \par  \par PLAN: \par -Schedule a Transesophageal  Echocardiogram \par -Return to office with images \par -After images reviewed consider possible Mitral Clip\par \par Delilah CLARK NP am scribing for and in the presence of  the following sections HISTORY OF PRESENT ILLNESS, PAST MEDICAL/FAMILY/SOCIAL HISTORY; REVIEW OF SYSTEMS; VITAL SIGNS; PHYSICAL EXAM; DISPOSITION.\par \par "I personally performed the services described in the documentation, reviewed the documentation recorded by the scribe in my presence and accurately and completely records my words and actions."\par \par

## 2021-03-10 NOTE — HISTORY OF PRESENT ILLNESS
[FreeTextEntry1] : Mr. Palmer is a 65 year old Male referred by Dr. Desouza for follow up evaluation of Severe Mitral Regurgitation. He has a a Past Medical History of  Hypertension, Hyperlipidemia, Diabetes Mellitus, Hepatitis C and Cirrhosis treated by Dr. Nelson,Aortic Stenosis,  Chronic Cardiac Murmur,   and  midRCA Stent x1(2/2021)  placed by Dr. Parker. \par \par He returns to the office today for follow up discussion on mitral clip candidacy. He was originally evaluated by Dr. Parker for his long  history of a  Cardiac Murmur and diagnosis of Mitral regurgitation. Today he  reports  minimal occasional shortness breath. He denies any chest pain, dizziness, palpitations, or lower extremity edema. He is here to  discuss interventional management.

## 2021-03-11 ENCOUNTER — OUTPATIENT (OUTPATIENT)
Dept: OUTPATIENT SERVICES | Facility: HOSPITAL | Age: 66
LOS: 1 days | End: 2021-03-11
Payer: MEDICARE

## 2021-03-11 DIAGNOSIS — Z98.89 OTHER SPECIFIED POSTPROCEDURAL STATES: Chronic | ICD-10-CM

## 2021-03-23 ENCOUNTER — OUTPATIENT (OUTPATIENT)
Dept: OUTPATIENT SERVICES | Facility: HOSPITAL | Age: 66
LOS: 1 days | End: 2021-03-23
Payer: MEDICARE

## 2021-03-23 VITALS
WEIGHT: 181.88 LBS | HEART RATE: 84 BPM | TEMPERATURE: 97 F | DIASTOLIC BLOOD PRESSURE: 80 MMHG | RESPIRATION RATE: 16 BRPM | HEIGHT: 66 IN | SYSTOLIC BLOOD PRESSURE: 135 MMHG

## 2021-03-23 DIAGNOSIS — Z98.89 OTHER SPECIFIED POSTPROCEDURAL STATES: Chronic | ICD-10-CM

## 2021-03-23 DIAGNOSIS — Z98.1 ARTHRODESIS STATUS: Chronic | ICD-10-CM

## 2021-03-23 DIAGNOSIS — Z29.9 ENCOUNTER FOR PROPHYLACTIC MEASURES, UNSPECIFIED: ICD-10-CM

## 2021-03-23 DIAGNOSIS — E11.9 TYPE 2 DIABETES MELLITUS WITHOUT COMPLICATIONS: ICD-10-CM

## 2021-03-23 DIAGNOSIS — I10 ESSENTIAL (PRIMARY) HYPERTENSION: ICD-10-CM

## 2021-03-23 DIAGNOSIS — Z01.818 ENCOUNTER FOR OTHER PREPROCEDURAL EXAMINATION: ICD-10-CM

## 2021-03-23 DIAGNOSIS — I34.0 NONRHEUMATIC MITRAL (VALVE) INSUFFICIENCY: ICD-10-CM

## 2021-03-23 DIAGNOSIS — Z98.890 OTHER SPECIFIED POSTPROCEDURAL STATES: Chronic | ICD-10-CM

## 2021-03-23 LAB
ANION GAP SERPL CALC-SCNC: 10 MMOL/L — SIGNIFICANT CHANGE UP (ref 5–17)
APTT BLD: 29.8 SEC — SIGNIFICANT CHANGE UP (ref 27.5–35.5)
BASOPHILS # BLD AUTO: 0.02 K/UL — SIGNIFICANT CHANGE UP (ref 0–0.2)
BASOPHILS NFR BLD AUTO: 0.3 % — SIGNIFICANT CHANGE UP (ref 0–2)
BUN SERPL-MCNC: 19 MG/DL — SIGNIFICANT CHANGE UP (ref 8–20)
CALCIUM SERPL-MCNC: 9.5 MG/DL — SIGNIFICANT CHANGE UP (ref 8.6–10.2)
CHLORIDE SERPL-SCNC: 100 MMOL/L — SIGNIFICANT CHANGE UP (ref 98–107)
CO2 SERPL-SCNC: 28 MMOL/L — SIGNIFICANT CHANGE UP (ref 22–29)
CREAT SERPL-MCNC: 0.94 MG/DL — SIGNIFICANT CHANGE UP (ref 0.5–1.3)
EOSINOPHIL # BLD AUTO: 0.27 K/UL — SIGNIFICANT CHANGE UP (ref 0–0.5)
EOSINOPHIL NFR BLD AUTO: 3.6 % — SIGNIFICANT CHANGE UP (ref 0–6)
GLUCOSE SERPL-MCNC: 223 MG/DL — HIGH (ref 70–99)
HCT VFR BLD CALC: 43.5 % — SIGNIFICANT CHANGE UP (ref 39–50)
HGB BLD-MCNC: 14.9 G/DL — SIGNIFICANT CHANGE UP (ref 13–17)
IMM GRANULOCYTES NFR BLD AUTO: 0.1 % — SIGNIFICANT CHANGE UP (ref 0–1.5)
INR BLD: 1.2 RATIO — HIGH (ref 0.88–1.16)
LYMPHOCYTES # BLD AUTO: 2.17 K/UL — SIGNIFICANT CHANGE UP (ref 1–3.3)
LYMPHOCYTES # BLD AUTO: 28.9 % — SIGNIFICANT CHANGE UP (ref 13–44)
MCHC RBC-ENTMCNC: 30 PG — SIGNIFICANT CHANGE UP (ref 27–34)
MCHC RBC-ENTMCNC: 34.3 GM/DL — SIGNIFICANT CHANGE UP (ref 32–36)
MCV RBC AUTO: 87.7 FL — SIGNIFICANT CHANGE UP (ref 80–100)
MONOCYTES # BLD AUTO: 0.79 K/UL — SIGNIFICANT CHANGE UP (ref 0–0.9)
MONOCYTES NFR BLD AUTO: 10.5 % — SIGNIFICANT CHANGE UP (ref 2–14)
NEUTROPHILS # BLD AUTO: 4.25 K/UL — SIGNIFICANT CHANGE UP (ref 1.8–7.4)
NEUTROPHILS NFR BLD AUTO: 56.6 % — SIGNIFICANT CHANGE UP (ref 43–77)
PLATELET # BLD AUTO: 281 K/UL — SIGNIFICANT CHANGE UP (ref 150–400)
POTASSIUM SERPL-MCNC: 4.5 MMOL/L — SIGNIFICANT CHANGE UP (ref 3.5–5.3)
POTASSIUM SERPL-SCNC: 4.5 MMOL/L — SIGNIFICANT CHANGE UP (ref 3.5–5.3)
PROTHROM AB SERPL-ACNC: 13.8 SEC — HIGH (ref 10.6–13.6)
RBC # BLD: 4.96 M/UL — SIGNIFICANT CHANGE UP (ref 4.2–5.8)
RBC # FLD: 12.9 % — SIGNIFICANT CHANGE UP (ref 10.3–14.5)
SARS-COV-2 RNA SPEC QL NAA+PROBE: SIGNIFICANT CHANGE UP
SODIUM SERPL-SCNC: 138 MMOL/L — SIGNIFICANT CHANGE UP (ref 135–145)
WBC # BLD: 7.51 K/UL — SIGNIFICANT CHANGE UP (ref 3.8–10.5)
WBC # FLD AUTO: 7.51 K/UL — SIGNIFICANT CHANGE UP (ref 3.8–10.5)

## 2021-03-23 PROCEDURE — U0005: CPT

## 2021-03-23 PROCEDURE — U0003: CPT

## 2021-03-23 PROCEDURE — G0463: CPT

## 2021-03-23 PROCEDURE — 80048 BASIC METABOLIC PNL TOTAL CA: CPT

## 2021-03-23 PROCEDURE — 85730 THROMBOPLASTIN TIME PARTIAL: CPT

## 2021-03-23 PROCEDURE — 36415 COLL VENOUS BLD VENIPUNCTURE: CPT

## 2021-03-23 PROCEDURE — 85025 COMPLETE CBC W/AUTO DIFF WBC: CPT

## 2021-03-23 PROCEDURE — 93005 ELECTROCARDIOGRAM TRACING: CPT

## 2021-03-23 PROCEDURE — 93010 ELECTROCARDIOGRAM REPORT: CPT

## 2021-03-23 PROCEDURE — 85610 PROTHROMBIN TIME: CPT

## 2021-03-23 RX ORDER — INSULIN LISPRO 100 [IU]/ML
0 INJECTION, SUSPENSION SUBCUTANEOUS
Qty: 0 | Refills: 0 | DISCHARGE

## 2021-03-23 NOTE — H&P PST ADULT - HISTORY OF PRESENT ILLNESS
schedule for QUINCY for evaluation for severe mitral regurgitation  possible surgical intervention with DR. Islas 66 year old male with past medical hx of HTN, HLD, DM, hepatitis C and Cirrhosis treated by Dr. Nelson, Aortic stenosis, CAD s/p mid RCA stent x1 ( 2/2021) schedule for QUINCY for evaluation for severe mitral regurgitation  for possible surgical intervention with DR. Islas in the future.    Cardiac cath 2/16/21 [facility].  Cardiac Catheterization from [facility] on 02/16/21  - Obstructive mRCA lesion (eccentric and ulcerated) and iFR negative mLAD lesion. Proceed with PCI of the mRCA in preparation for MitraClip procedure as he has been previously turned down for open heart surgery.   LM: Normal   LAD: Normal   MId LAD Diffuse 50% stenosis   RCA: Normal   Proximal RCA: Diffuse 30% stenosis   Mid RCA : DIscrete 80% stenosis. The lesion was ulcerated and without evidence of thrombus. MATTHEW 3 flow and large vascular territory distal to the lesion. Showed moderate atherosclerosis   DIstal RCA moderate atherosclerosis   Cx: Vessel large size. Minor luminal irregularity     7.19.2020 [facility] Transthoracic Echocardiogram   -Mitral valve prolapse involving the posterior mitral leaflet with calcified chordae  -Severe eccentric mitral regurgitation   -Thickened aortic valve with calcified non coronary cusp   -Mildly dilated left atrium.LA volume index 36 cc/m2   -Normal left ventricular interna; systolic function. No segmental wall motion abnormalities   -Moderate diastolic dysfunction (stage II   -Note LVEF may be overestimated given severity of mitral valve regurgitation.

## 2021-03-23 NOTE — H&P PST ADULT - NSICDXPROBLEM_GEN_ALL_CORE_FT
PROBLEM DIAGNOSES  Problem: HTN (hypertension)  Assessment and Plan: continue medication as directed  routine labs     Problem: Mitral insufficiency  Assessment and Plan: QUINCY with anesthesia    Problem: Diabetes mellitus  Assessment and Plan: continue medication as directed  f/u with PCP    Problem: Need for prophylactic measure  Assessment and Plan: Moderate Risk, surgical team should consider VTE prophylaxis

## 2021-03-23 NOTE — H&P PST ADULT - NSICDXPASTSURGICALHX_GEN_ALL_CORE_FT
PAST SURGICAL HISTORY:  H/O coronary angiogram midRCA sten (2/2021)    H/O hernia repair 1985    H/O neck surgery PCDF 1992,  ACDF 1993    S/P appendectomy 1974,  right shoulder dislocation 1962    S/P shoulder surgery     S/P spinal fusion

## 2021-03-23 NOTE — H&P PST ADULT - ASSESSMENT
CAPRINI SCORE [CLOT]    AGE RELATED RISK FACTORS                                                       MOBILITY RELATED FACTORS  [ ] Age 41-60 years                                            (1 Point)                  [ ] Bed rest                                                        (1 Point)  [x ] Age: 61-74 years                                           (2 Points)                 [ ] Plaster cast                                                   (2 Points)  [ ] Age= 75 years                                              (3 Points)                   [ ] Bed bound for more than 72 hours                 (2 Points)    DISEASE RELATED RISK FACTORS                                               GENDER SPECIFIC FACTORS  [ ] Edema in the lower extremities                       (1 Point)              [ ] Pregnancy                                                     (1 Point)  [ ] Varicose veins                                               (1 Point)                     [ ] Post-partum < 6 weeks                                   (1 Point)             [x ] BMI > 25 Kg/m2                                            (1 Point)                     [ ] Hormonal therapy  or oral contraception          (1 Point)                 [ ] Sepsis (in the previous month)                        (1 Point)                [ ] History of pregnancy complications                 (1 point)  [ ] Pneumonia or serious lung disease                                                [ ] Unexplained or recurrent                     (1 Point)           (in the previous month)                               (1 Point)  [ ] Abnormal pulmonary function test                     (1 Point)                 SURGERY RELATED RISK FACTORS  [ ] Acute myocardial infarction                              (1 Point)                   [ ]  Section                                             (1 Point)  [ ] Congestive heart failure (in the previous month)  (1 Point)           [ x] Minor surgery                                                  (1 Point)   [ ] Inflammatory bowel disease                             (1 Point)                   [ ] Arthroscopic surgery                                        (2 Points)  [ ] Central venous access                                      (2 Points)                    [ ] General surgery lasting more than 45 minutes   (2 Points)       [ ] Stroke (in the previous month)                          (5 Points)                 [ ] Elective arthroplasty                                         (5 Points)            [ ] Malignacy (past or present)                          (2 ponits)                                                                                                                            HEMATOLOGY RELATED FACTORS                                                 TRAUMA RELATED RISK FACTORS  [ ] Prior episodes of VTE                                     (3 Points)                [ ] Fracture of the hip, pelvis, or leg                       (5 Points)  [ ] Positive family history for VTE                         (3 Points)             [ ] Acute spinal cord injury (in the previous month)  (5 Points)  [ ] Prothrombin 26748 A                                     (3 Points)                [ ] Paralysis  (less than 1 month)                             (5 Points)  [ ] Factor V Leiden                                             (3 Points)                   [ ] Multiple Trauma within 1 month                        (5 Points)  [ ] Lupus anticoagulants                                     (3 Points)                                                           [ ] Anticardiolipin antibodies                               (3 Points)                                                       [ ] High homocysteine in the blood                      (3 Points)                                             [ ] Other congenital or acquired thrombophilia      (3 Points)                                                [ ] Heparin induced thrombocytopenia                  (3 Points)                                          Total Score [  4   ]    Caprini Score 0 - 2:  Low Risk, No VTE Prophylaxis required for most patients, encourage ambulation  Caprini Score 3 - 6:  At Risk, pharmacologic VTE prophylaxis is indicated for most patients (in the absence of a contraindication)  Caprini Score Greater than or = 7:  High Risk, pharmacologic VTE prophylaxis is indicated for most patients (in the absence of a contraindication)      66 year old male with past medical hx of HTN, HLD, DM, hepatitis C and Cirrhosis treated by Dr. Nelson, Aortic stenosis, CAD s/p mid RCA stent x1 ( 2021) schedule for QUINCY for evaluation for severe mitral regurgitation  possible surgical intervention with DR. Islas  Patient educated on written and verbal preop instructions.   medications reviewed, instructions given on what medications to take and what not to take.  Pt instructed to stop Herbals or anti-inflamatory meds one week prior to surgery and discuss with PMD.   CAPRINI SCORE [CLOT]    AGE RELATED RISK FACTORS                                                       MOBILITY RELATED FACTORS  [ ] Age 41-60 years                                            (1 Point)                  [ ] Bed rest                                                        (1 Point)  [x ] Age: 61-74 years                                           (2 Points)                 [ ] Plaster cast                                                   (2 Points)  [ ] Age= 75 years                                              (3 Points)                   [ ] Bed bound for more than 72 hours                 (2 Points)    DISEASE RELATED RISK FACTORS                                               GENDER SPECIFIC FACTORS  [ ] Edema in the lower extremities                       (1 Point)              [ ] Pregnancy                                                     (1 Point)  [ ] Varicose veins                                               (1 Point)                     [ ] Post-partum < 6 weeks                                   (1 Point)             [x ] BMI > 25 Kg/m2                                            (1 Point)                     [ ] Hormonal therapy  or oral contraception          (1 Point)                 [ ] Sepsis (in the previous month)                        (1 Point)                [ ] History of pregnancy complications                 (1 point)  [ ] Pneumonia or serious lung disease                                                [ ] Unexplained or recurrent                     (1 Point)           (in the previous month)                               (1 Point)  [ ] Abnormal pulmonary function test                     (1 Point)                 SURGERY RELATED RISK FACTORS  [ ] Acute myocardial infarction                              (1 Point)                   [ ]  Section                                             (1 Point)  [ ] Congestive heart failure (in the previous month)  (1 Point)           [ x] Minor surgery                                                  (1 Point)   [ ] Inflammatory bowel disease                             (1 Point)                   [ ] Arthroscopic surgery                                        (2 Points)  [ ] Central venous access                                      (2 Points)                    [ ] General surgery lasting more than 45 minutes   (2 Points)       [ ] Stroke (in the previous month)                          (5 Points)                 [ ] Elective arthroplasty                                         (5 Points)            [ ] Malignacy (past or present)                          (2 ponits)                                                                                                                            HEMATOLOGY RELATED FACTORS                                                 TRAUMA RELATED RISK FACTORS  [ ] Prior episodes of VTE                                     (3 Points)                [ ] Fracture of the hip, pelvis, or leg                       (5 Points)  [ ] Positive family history for VTE                         (3 Points)             [ ] Acute spinal cord injury (in the previous month)  (5 Points)  [ ] Prothrombin 56884 A                                     (3 Points)                [ ] Paralysis  (less than 1 month)                             (5 Points)  [ ] Factor V Leiden                                             (3 Points)                   [ ] Multiple Trauma within 1 month                        (5 Points)  [ ] Lupus anticoagulants                                     (3 Points)                                                           [ ] Anticardiolipin antibodies                               (3 Points)                                                       [ ] High homocysteine in the blood                      (3 Points)                                             [ ] Other congenital or acquired thrombophilia      (3 Points)                                                [ ] Heparin induced thrombocytopenia                  (3 Points)                                          Total Score [  4   ]    Caprini Score 0 - 2:  Low Risk, No VTE Prophylaxis required for most patients, encourage ambulation  Caprini Score 3 - 6:  At Risk, pharmacologic VTE prophylaxis is indicated for most patients (in the absence of a contraindication)  Caprini Score Greater than or = 7:  High Risk, pharmacologic VTE prophylaxis is indicated for most patients (in the absence of a contraindication)      66 year old male with past medical hx of HTN, HLD, DM, hepatitis C and Cirrhosis treated by Dr. Nelson, Aortic stenosis, CAD s/p mid RCA stent x1 ( 2021) schedule for [facility] for evaluation for severe mitral regurgitation  possible surgical intervention with DR. Islas  Patient educated on written and verbal preop instructions.   medications reviewed, instructions given on what medications to take and what not to take.  Pt instructed to stop Herbals or anti-inflamatory meds one week prior to surgery and discuss with PMD.    Cardiology NP Addendum:  -Patient seen and examined; Agree with above H&P  -Consent to be obtained by MD  -Patient NPO

## 2021-03-23 NOTE — H&P PST ADULT - NSICDXPASTMEDICALHX_GEN_ALL_CORE_FT
PAST MEDICAL HISTORY:  Aortic stenosis     CAD (coronary artery disease)     Chronic neck pain     Diabetes history of retinal hemorrhage    Hepatitis C treated 2014 by Dr. Nelson    Hypertension     Lung nodule     Murmur     Severe mitral regurgitation

## 2021-03-26 ENCOUNTER — TRANSCRIPTION ENCOUNTER (OUTPATIENT)
Age: 66
End: 2021-03-26

## 2021-03-26 ENCOUNTER — OUTPATIENT (OUTPATIENT)
Dept: OUTPATIENT SERVICES | Facility: HOSPITAL | Age: 66
LOS: 1 days | End: 2021-03-26
Payer: MEDICARE

## 2021-03-26 VITALS
SYSTOLIC BLOOD PRESSURE: 168 MMHG | TEMPERATURE: 98 F | RESPIRATION RATE: 18 BRPM | HEIGHT: 66 IN | DIASTOLIC BLOOD PRESSURE: 95 MMHG | WEIGHT: 185.19 LBS | OXYGEN SATURATION: 98 % | HEART RATE: 92 BPM

## 2021-03-26 DIAGNOSIS — Z98.89 OTHER SPECIFIED POSTPROCEDURAL STATES: Chronic | ICD-10-CM

## 2021-03-26 DIAGNOSIS — Z98.1 ARTHRODESIS STATUS: Chronic | ICD-10-CM

## 2021-03-26 DIAGNOSIS — Z98.890 OTHER SPECIFIED POSTPROCEDURAL STATES: Chronic | ICD-10-CM

## 2021-03-26 DIAGNOSIS — I34.0 NONRHEUMATIC MITRAL (VALVE) INSUFFICIENCY: ICD-10-CM

## 2021-03-26 PROCEDURE — 93312 ECHO TRANSESOPHAGEAL: CPT

## 2021-03-26 PROCEDURE — 93320 DOPPLER ECHO COMPLETE: CPT | Mod: 26

## 2021-03-26 PROCEDURE — 93320 DOPPLER ECHO COMPLETE: CPT

## 2021-03-26 PROCEDURE — 93325 DOPPLER ECHO COLOR FLOW MAPG: CPT

## 2021-03-26 PROCEDURE — 76376 3D RENDER W/INTRP POSTPROCES: CPT | Mod: 26

## 2021-03-26 PROCEDURE — 93325 DOPPLER ECHO COLOR FLOW MAPG: CPT | Mod: 26

## 2021-03-26 PROCEDURE — 93312 ECHO TRANSESOPHAGEAL: CPT | Mod: 26

## 2021-03-26 RX ORDER — METOPROLOL TARTRATE 50 MG
1 TABLET ORAL
Qty: 0 | Refills: 0 | DISCHARGE

## 2021-03-26 RX ORDER — DULAGLUTIDE 4.5 MG/.5ML
0 INJECTION, SOLUTION SUBCUTANEOUS
Qty: 0 | Refills: 0 | DISCHARGE

## 2021-03-26 RX ORDER — INSULIN LISPRO 100 [IU]/ML
12 INJECTION, SUSPENSION SUBCUTANEOUS
Qty: 0 | Refills: 0 | DISCHARGE

## 2021-03-26 NOTE — PROGRESS NOTE ADULT - SUBJECTIVE AND OBJECTIVE BOX
Cardiology NP post procedure note:    -s/p QUINCY: Degenerative posterior mitral valve leaflet (measured 0.8 cm) with prolapsed and partially flailed P2 segment, there is a calcific lesion (1.6 cm x 0.6 cm) immediately below the posterior leaflet. Severe eccentric mitral regurgitation noted, jet is anteriorly directed with Coanda effect, systolic pulmonary veins reversal noted as well. Mean mitral valve gradient of 2.3 mmHg (at HR of 88 bpm); No left atrial appendage thrombus, left atrial appendage enlargement and normal left atrial appendage velocities;  Small pericardial effusion (0.5 cm) adjacent to the RV; normal LV      TELE: NSR 80s    Allergies:  No Known Allergies      PAST MEDICAL & SURGICAL HISTORY:  Aortic stenosis    CAD (coronary artery disease)    Severe mitral regurgitation    Lung nodule    Murmur    Chronic neck pain    Hepatitis C  treated 2014 by Dr. Nelson    Hypertension    Diabetes  history of retinal hemorrhage    H/O coronary angiogram  midRCA sten (2/2021)    S/P shoulder surgery    S/P spinal fusion    H/O hernia repair  1985    S/P appendectomy  1974,  right shoulder dislocation 1962    H/O neck surgery  PCDF 1992,  ACDF 1993        Vital Signs Last 24 Hrs  T(C): 36.8 (26 Mar 2021 08:25), Max: 36.8 (26 Mar 2021 08:25)  T(F): 98.3 (26 Mar 2021 08:25), Max: 98.3 (26 Mar 2021 08:25)  HR: 92 (26 Mar 2021 08:25) (92 - 92)  BP: 168/95 (26 Mar 2021 08:25) (168/95 - 168/95)  BP(mean): --  RR: 18 (26 Mar 2021 08:25) (18 - 18)  SpO2: 98% (26 Mar 2021 08:25) (98% - 98%)    Physical Exam:  Constitutional: NAD, AAOx3  Cardiovascular: +S1S2 RRR  Pulmonary: CTA b/l, unlabored  GI: soft NTND +BS  Extremities: no pedal edema, +distal pulses b/l  Neuro: non focal, RICO x4    LABS:      RADIOLOGY & ADDITIONAL TESTS:  < from: [facility] Echo Doppler (03.26.21 @ 09:24) >  NTERPRETATION:  REPORT:  TRANSESOPHAGEAL ECHOCARDIOGRAM REPORT        Patient Name:   LYDIA PEACE Patient Location: Outpatient  Medical Rec #:  [id number]    Accession #:      [id number]  Account #:                  Height:           66.0 in 167.6 cm  YOB: 1955   Weight:           185.0 lb 83.92 kg  Patient Age:    66 years    BSA:        1.93 m²  Patient Gender: M           BP:               179/104 mmHg      Date of Exam:        3/26/2021 9:24:43 AM  Sonographer:         Mike Luciano  Referring Physician: Wellington Islas MD  Copy report sent to: Adam Desouza    Procedure:   Transesophageal Echocardiogram, 3-D Rendering with image post               Processing, same workstation and Saline Contrast (Bubble Study).  Indications: Nonrheumatic mitral (valve) insufficiency - I34.0  Diagnosis:   Nonrheumatic mitral (valve) insufficiency - I34.0    SPECTRAL DOPPLER ANALYSIS (where applicable):  Tricuspid Valve and PA/RV Systolic Pressure: TR Max Velocity: 1.96 m/s RA Pressure:  RVSP/PASP:        PROCEDURE: Intravenous sedation was performed by anesthesia. Images were obtained with the patient in a left lateral decubitus position. The patient's vital signs; including heart rate, blood pressure, and oxygen saturation; remained stable throughout the procedure. The patient tolerated the procedure well and without complications.    PHYSICIAN INTERPRETATION:  Left Ventricle:  Global LV systolic function was normal. Left ventricular ejection fraction, by visual estimation, is 60 to 65%. The left ventricular diastolic function could not be assessed in this study.  Left Atrium: Left atrial enlargement. No left atrial appendage thrombus is seen, the left atrial appendage is enlarged and normal left atrial appendage velocities. Color flow doppler and intravenous injection of agitated saline demonstrates the presence of an intact intra atrial septum.  Right Atrium: Normal right atrial size.  Pericardium: A small pericardial effusion is present.  Mitral Valve: Degenerative posterior mitral valve leaflet (measured 0.8 cm) with prolapsed and partially flailed P2 segment, there is a calcific lesion below the posterior leaflet. Severe eccentric mitral regurgitation noted, jet is anteriorly directed with Coanda effect, systolic pulmonary veins reversal noted as well. Mean mitral valve gradient of 2.3 mmHg (at HR of 88 bpm).  Tricuspid Valve: The tricuspid valve is normal in structure. Mild tricuspid regurgitation is visualized.  Aortic Valve: Sclerotic aortic valve with normal opening.  Pulmonic Valve: The pulmonic valve is normal. No indication of pulmonic valve regurgitation.  Shunts: Agitated saline contrast was given intravenously to evaluate for intracardiac shunting.  In comparison to the previous echocardiogram(s): There are no prior studies on this patient for comparison purposes. No prior QUINCY study is available for comparison.      Summary:   1. Normal global left ventricular systolic function.   2. Left ventricular ejection fraction, by visual estimation, is 60 to 65% (by 3D 61%).   3. The left ventricular diastolic function could not be assessed in this study.   4. Normal RV size and function, inadequate estimation of RVSP.   5. Left atrial enlargement.   6. Degenerative posterior mitral valve leaflet (measured 0.8 cm) with prolapsed and partially flailed P2 segment, there is a calcific lesion (1.6 cm x 0.6 cm) immediately below the posterior leaflet. Severe eccentric mitral regurgitation noted, jet is anteriorly directed with Coanda effect, systolic pulmonary veins reversal noted as well. Mean mitral valve gradient of 2.3 mmHg (at HR of 88 bpm).   7. Mild tricuspid regurgitation.   8. Sclerotic aortic valve with normal opening, CHAS (by 2D-planimetry) 2.59 cm2.   9. Color flow doppler and intravenous injection of agitated saline demonstrates the presence of an intact intra atrial septum.  10. No left atrial appendage thrombus, left atrial appendage enlargement and normal left atrial appendage velocities.  11. Small pericardial effusion (0.5 cm) adjacent to the RV.  12. No prior QUINCY study is available for comparison.    Derian Hill DO Electronically signed on 3/26/2021 at 10:45:59 AM    < end of copied text >

## 2021-03-26 NOTE — DISCHARGE NOTE NURSING/CASE MANAGEMENT/SOCIAL WORK - NSDCFUADDAPPT_GEN_ALL_CORE_FT
Follow up with Dr. Desouza in one to two weeks    Follow up with Dr. Islas as instructed    Notify your doctor if you develop a fever, cough up blood, have any chest pain, palpitations or difficulty breathing. You may take a throat lozenge if you develop a sore throat or try warm salt water gargle. Resume your diet with soft foods first. Follow up with your cardiologist within 2 weeks for a follow up or sooner with any concerns. Report to the nearest ER with any emergencies.

## 2021-03-26 NOTE — DISCHARGE NOTE NURSING/CASE MANAGEMENT/SOCIAL WORK - PATIENT PORTAL LINK FT
You can access the FollowMyHealth Patient Portal offered by [facility] by registering at the following website: [email or web address]. By joining [facility]’s FollowMyHealth portal, you will also be able to view your health information using other applications (apps) compatible with our system.

## 2021-03-26 NOTE — DISCHARGE NOTE PROVIDER - HOSPITAL COURSE
66 year old male with past medical hx of HTN, HLD, DM, hepatitis C and Cirrhosis treated by Dr. Nelson, Aortic stenosis, CAD s/p mid RCA stent x1 ( 2/2021) schedule for [facility]INCY for evaluation for severe mitral regurgitation  for possible surgical intervention with DR. Islas in the future now s/p QUINCY: Degenerative posterior mitral valve leaflet (measured 0.8 cm) with prolapsed and partially flailed P2 segment, there is a calcific lesion (1.6 cm x 0.6 cm) immediately below the posterior leaflet. Severe eccentric mitral regurgitation noted, jet is anteriorly directed with Coanda effect, systolic pulmonary veins reversal noted as well. Mean mitral valve gradient of 2.3 mmHg (at HR of 88 bpm); No left atrial appendage thrombus, left atrial appendage enlargement and normal left atrial appendage velocities;  Small pericardial effusion (0.5 cm) adjacent to the RV; normal LV.  -Recovery as per post QUINCY orders  -Follow up with private cardiologist and Dr. Islas within 2 weeks  -Continue current med regimen as before  -Discharge to home once fully awake and tolerating PO intake  -Activity instructions discussed with patient verbal understanding

## 2021-03-26 NOTE — DISCHARGE NOTE PROVIDER - CARE PROVIDER_API CALL
Adam Desouza)  Cardiology  [address] [place] [address]  Phone: [phone]  Fax: [phone]  Follow Up Time:     Wellington Islas)  Surgery; Thoracic and Cardiac Surgery  [address] [place] [address]  Phone: [phone]  Fax: [phone]  Follow Up Time:

## 2021-03-26 NOTE — DISCHARGE NOTE PROVIDER - NSDCCPTREATMENT_GEN_ALL_CORE_FT
PRINCIPAL PROCEDURE  Procedure: Complete transesophageal echocardiography (QUINCY) with color flow Doppler imaging  Findings and Treatment: Notify your doctor if you develop a fever, cough up blood, have any chest pain, palpitations or difficulty breathing. You may take a throat lozenge if you develop a sore throat or try warm salt water gargle. Resume your diet with soft foods first. Follow up with your cardiologist within 2 weeks for a follow up or sooner with any concerns. Report to the nearest ER with any emergencies.

## 2021-03-26 NOTE — PROGRESS NOTE ADULT - ASSESSMENT
A/P: 66 year old male with past medical hx of HTN, HLD, DM, hepatitis C and Cirrhosis treated by Dr. Nelson, Aortic stenosis, CAD s/p mid RCA stent x1 ( 2/2021) schedule for QUINCY for evaluation for severe mitral regurgitation  for possible surgical intervention with DR. Islas in the future now s/p QUINCY: Degenerative posterior mitral valve leaflet (measured 0.8 cm) with prolapsed and partially flailed P2 segment, there is a calcific lesion (1.6 cm x 0.6 cm) immediately below the posterior leaflet. Severe eccentric mitral regurgitation noted, jet is anteriorly directed with Coanda effect, systolic pulmonary veins reversal noted as well. Mean mitral valve gradient of 2.3 mmHg (at HR of 88 bpm); No left atrial appendage thrombus, left atrial appendage enlargement and normal left atrial appendage velocities;  Small pericardial effusion (0.5 cm) adjacent to the RV; normal LV.  -Recovery as per post QUINCY orders  -Follow up with private cardiologist and Dr. Islas within 2 weeks  -Continue current med regimen as before  -Discharge to home once fully awake and tolerating PO intake  -Activity instructions discussed with patient verbal understanding

## 2021-03-26 NOTE — DISCHARGE NOTE PROVIDER - NSDCFUSCHEDAPPT_GEN_ALL_CORE_FT
LYDIA PEACE ; 04/23/2021 ; [facility] Cardio [address] ; 04/26/2021 ; [facility] CT Surg [address] ; 06/01/2021 ; [facility] Rad [address]

## 2021-03-26 NOTE — DISCHARGE NOTE PROVIDER - CARE PROVIDERS DIRECT ADDRESSES
[email or web address]

## 2021-03-26 NOTE — DISCHARGE NOTE PROVIDER - NSDCMRMEDTOKEN_GEN_ALL_CORE_FT
amlodipine-valsartan 10 mg-320 mg oral tablet: 1 tab(s) orally once a day  aspirin 81 mg oral delayed release tablet: 1 tab(s) orally once a day  ATORVASTATIN 80 MG TABLET:   CLOPIDOGREL 75 MG TABLET:   FREESTYLE BLAIRE 14 DAY SENSOR: APPLY AS DIRECTED EVERY 14 DAYS  hydroCHLOROthiazide 12.5 mg oral tablet: 1 tab(s) orally once a day  HYDROCODONE-ACETAMIN  MG: TAKE 1 TABLET BY MOUTH EVERY 4 TO 6 HOURS AS NEEDED FOR PAIN  Insulin Lispro KwikPen 100 units/mL injectable solution: 72/25  12units   methIMAzole 5 mg oral tablet: orally every 48 hours  Metoprolol Succinate ER 25 mg oral tablet, extended release: 1 tab(s) orally once a day  Trulicity Pen 0.75 mg/0.5 mL subcutaneous solution:

## 2021-03-26 NOTE — DISCHARGE NOTE PROVIDER - NSDCCPCAREPLAN_GEN_ALL_CORE_FT
PRINCIPAL DISCHARGE DIAGNOSIS  Diagnosis: Severe mitral regurgitation  Assessment and Plan of Treatment: Follow up with surgeon

## 2021-04-05 ENCOUNTER — OUTPATIENT (OUTPATIENT)
Dept: OUTPATIENT SERVICES | Facility: HOSPITAL | Age: 66
LOS: 1 days | End: 2021-04-05

## 2021-04-05 DIAGNOSIS — Z98.890 OTHER SPECIFIED POSTPROCEDURAL STATES: Chronic | ICD-10-CM

## 2021-04-05 DIAGNOSIS — Z98.89 OTHER SPECIFIED POSTPROCEDURAL STATES: Chronic | ICD-10-CM

## 2021-04-05 DIAGNOSIS — Z98.1 ARTHRODESIS STATUS: Chronic | ICD-10-CM

## 2021-04-13 PROCEDURE — 99285 EMERGENCY DEPT VISIT HI MDM: CPT | Mod: CS

## 2021-04-14 ENCOUNTER — INPATIENT (INPATIENT)
Facility: HOSPITAL | Age: 66
LOS: 6 days | Discharge: HOME | End: 2021-04-21
Payer: MEDICARE

## 2021-04-14 ENCOUNTER — OUTPATIENT (OUTPATIENT)
Dept: OUTPATIENT SERVICES | Facility: HOSPITAL | Age: 66
LOS: 1 days | End: 2021-04-14

## 2021-04-14 DIAGNOSIS — Z98.1 ARTHRODESIS STATUS: Chronic | ICD-10-CM

## 2021-04-14 DIAGNOSIS — Z98.89 OTHER SPECIFIED POSTPROCEDURAL STATES: Chronic | ICD-10-CM

## 2021-04-14 DIAGNOSIS — Z98.890 OTHER SPECIFIED POSTPROCEDURAL STATES: Chronic | ICD-10-CM

## 2021-04-14 PROCEDURE — 71045 X-RAY EXAM CHEST 1 VIEW: CPT | Mod: 26

## 2021-04-14 PROCEDURE — 93010 ELECTROCARDIOGRAM REPORT: CPT

## 2021-04-14 PROCEDURE — 74177 CT ABD & PELVIS W/CONTRAST: CPT | Mod: 26

## 2021-04-15 ENCOUNTER — OUTPATIENT (OUTPATIENT)
Dept: OUTPATIENT SERVICES | Facility: HOSPITAL | Age: 66
LOS: 1 days | End: 2021-04-15

## 2021-04-15 DIAGNOSIS — Z98.89 OTHER SPECIFIED POSTPROCEDURAL STATES: Chronic | ICD-10-CM

## 2021-04-15 DIAGNOSIS — Z98.890 OTHER SPECIFIED POSTPROCEDURAL STATES: Chronic | ICD-10-CM

## 2021-04-15 DIAGNOSIS — Z98.1 ARTHRODESIS STATUS: Chronic | ICD-10-CM

## 2021-04-16 ENCOUNTER — OUTPATIENT (OUTPATIENT)
Dept: OUTPATIENT SERVICES | Facility: HOSPITAL | Age: 66
LOS: 1 days | End: 2021-04-16

## 2021-04-16 DIAGNOSIS — Z98.1 ARTHRODESIS STATUS: Chronic | ICD-10-CM

## 2021-04-16 DIAGNOSIS — Z98.890 OTHER SPECIFIED POSTPROCEDURAL STATES: Chronic | ICD-10-CM

## 2021-04-16 DIAGNOSIS — Z98.89 OTHER SPECIFIED POSTPROCEDURAL STATES: Chronic | ICD-10-CM

## 2021-04-17 ENCOUNTER — OUTPATIENT (OUTPATIENT)
Dept: OUTPATIENT SERVICES | Facility: HOSPITAL | Age: 66
LOS: 1 days | End: 2021-04-17

## 2021-04-17 DIAGNOSIS — Z98.1 ARTHRODESIS STATUS: Chronic | ICD-10-CM

## 2021-04-17 DIAGNOSIS — Z98.89 OTHER SPECIFIED POSTPROCEDURAL STATES: Chronic | ICD-10-CM

## 2021-04-17 DIAGNOSIS — Z98.890 OTHER SPECIFIED POSTPROCEDURAL STATES: Chronic | ICD-10-CM

## 2021-04-17 PROCEDURE — 74177 CT ABD & PELVIS W/CONTRAST: CPT | Mod: 26

## 2021-04-18 ENCOUNTER — OUTPATIENT (OUTPATIENT)
Dept: OUTPATIENT SERVICES | Facility: HOSPITAL | Age: 66
LOS: 1 days | End: 2021-04-18

## 2021-04-18 DIAGNOSIS — Z98.89 OTHER SPECIFIED POSTPROCEDURAL STATES: Chronic | ICD-10-CM

## 2021-04-18 DIAGNOSIS — Z98.890 OTHER SPECIFIED POSTPROCEDURAL STATES: Chronic | ICD-10-CM

## 2021-04-18 DIAGNOSIS — Z98.1 ARTHRODESIS STATUS: Chronic | ICD-10-CM

## 2021-04-18 PROCEDURE — 71250 CT THORAX DX C-: CPT | Mod: 26

## 2021-04-19 ENCOUNTER — OUTPATIENT (OUTPATIENT)
Dept: OUTPATIENT SERVICES | Facility: HOSPITAL | Age: 66
LOS: 1 days | End: 2021-04-19

## 2021-04-19 DIAGNOSIS — Z98.1 ARTHRODESIS STATUS: Chronic | ICD-10-CM

## 2021-04-19 DIAGNOSIS — Z98.890 OTHER SPECIFIED POSTPROCEDURAL STATES: Chronic | ICD-10-CM

## 2021-04-19 DIAGNOSIS — Z98.89 OTHER SPECIFIED POSTPROCEDURAL STATES: Chronic | ICD-10-CM

## 2021-04-20 ENCOUNTER — OUTPATIENT (OUTPATIENT)
Dept: OUTPATIENT SERVICES | Facility: HOSPITAL | Age: 66
LOS: 1 days | End: 2021-04-20

## 2021-04-20 DIAGNOSIS — Z98.89 OTHER SPECIFIED POSTPROCEDURAL STATES: Chronic | ICD-10-CM

## 2021-04-20 DIAGNOSIS — Z98.890 OTHER SPECIFIED POSTPROCEDURAL STATES: Chronic | ICD-10-CM

## 2021-04-20 DIAGNOSIS — Z98.1 ARTHRODESIS STATUS: Chronic | ICD-10-CM

## 2021-04-21 ENCOUNTER — OUTPATIENT (OUTPATIENT)
Dept: OUTPATIENT SERVICES | Facility: HOSPITAL | Age: 66
LOS: 1 days | End: 2021-04-21

## 2021-04-21 DIAGNOSIS — Z98.89 OTHER SPECIFIED POSTPROCEDURAL STATES: Chronic | ICD-10-CM

## 2021-04-21 DIAGNOSIS — Z98.890 OTHER SPECIFIED POSTPROCEDURAL STATES: Chronic | ICD-10-CM

## 2021-04-21 DIAGNOSIS — Z98.1 ARTHRODESIS STATUS: Chronic | ICD-10-CM

## 2021-04-21 PROCEDURE — 71046 X-RAY EXAM CHEST 2 VIEWS: CPT | Mod: 26

## 2021-04-23 PROBLEM — Z95.5 STATUS POST PLACEMENT OF STENT IN RIGHT CORONARY ARTERY: Status: ACTIVE | Noted: 2021-03-10

## 2021-04-23 PROBLEM — I35.0 NONRHEUMATIC AORTIC (VALVE) STENOSIS: Chronic | Status: ACTIVE | Noted: 2021-03-23

## 2021-04-23 PROBLEM — I34.0 NONRHEUMATIC MITRAL (VALVE) INSUFFICIENCY: Chronic | Status: ACTIVE | Noted: 2021-03-23

## 2021-04-26 ENCOUNTER — APPOINTMENT (OUTPATIENT)
Dept: CARDIOTHORACIC SURGERY | Facility: CLINIC | Age: 66
End: 2021-04-26
Payer: MEDICARE

## 2021-04-26 VITALS
BODY MASS INDEX: 28.12 KG/M2 | HEIGHT: 66 IN | DIASTOLIC BLOOD PRESSURE: 80 MMHG | TEMPERATURE: 97 F | SYSTOLIC BLOOD PRESSURE: 128 MMHG | WEIGHT: 175 LBS | HEART RATE: 88 BPM | OXYGEN SATURATION: 99 %

## 2021-04-26 DIAGNOSIS — Z95.5 PRESENCE OF CORONARY ANGIOPLASTY IMPLANT AND GRAFT: ICD-10-CM

## 2021-04-26 PROCEDURE — 99213 OFFICE O/P EST LOW 20 MIN: CPT

## 2021-04-28 NOTE — PHYSICAL EXAM
[Sclera] : the sclera and conjunctiva were normal [Neck Appearance] : the appearance of the neck was normal [] : no respiratory distress [Exaggerated Use Of Accessory Muscles For Inspiration] : no accessory muscle use [Apical Impulse] : the apical impulse was normal [Heart Sounds] : normal S1 and S2 [Examination Of The Chest] : the chest was normal in appearance [Arterial Pulses Carotid] : carotid pulses were normal with no bruits [Arterial Pulses Femoral] : femoral pulses were normal without bruits [Edema] : there was no peripheral edema [Breast Appearance] : normal in appearance [Bowel Sounds] : normal bowel sounds [Abdomen Soft] : soft [Cervical Lymph Nodes Enlarged Posterior Bilaterally] : posterior cervical [Supraclavicular Lymph Nodes Enlarged Bilaterally] : supraclavicular [No CVA Tenderness] : no ~M costovertebral angle tenderness [Abnormal Walk] : normal gait [Nail Clubbing] : no clubbing  or cyanosis of the fingernails [Involuntary Movements] : no involuntary movements were seen [Skin Color & Pigmentation] : normal skin color and pigmentation [Skin Turgor] : normal skin turgor [Cranial Nerves] : cranial nerves 2-12 were intact [Oriented To Time, Place, And Person] : oriented to person, place, and time [FreeTextEntry1] : III/VI systolic murmur at the apex

## 2021-04-28 NOTE — DATA REVIEWED
[FreeTextEntry1] : Transesophageal Echocardiogram  [facility] on 3/26/21\par - LVEF 60-65%\par - Degenerative posterior mitral leaflet with prolapsed and partially flailed P2 segment, there is calcific lesion immediately below the posterior leaflet. Severe eccentric mitral regurgitation noted. Jet is anteriorly directed with Coanda effect, systolic pulmonary veins reversal noted as well. Mean mitral valve gradient of 2.3 mmHg\par - Sclerotic aortic valve with normal opening, CHAS 2.59 cm2\par - Small pericardial effusion adjacent to the RV\par \par Cardiac Catheterization from [facility] on 02/16/21\par - Obstructive mRCA lesion (eccentric and ulcerated) and iFR negative mLAD lesion. Proceed with PCI of the mRCA in preparation for MitraClip procedure as he has been previously turned down for open heart surgery. \par LM: Normal \par LAD: Normal \par MId LAD Diffuse 50% stenosis \par RCA: Normal \par Proximal RCA: Diffuse 30% stenosis \par Mid RCA : DIscrete 80% stenosis. The lesion was ulcerated and without evidence of  thrombus. MATTHEW 3 flow and large vascular territory distal to the lesion. Showed moderate atherosclerosis \par DIstal RCA moderate atherosclerosis \par Cx: Vessel large size. Minor luminal irregularity \par \par Transthoracic Echocardiogram from 7/19/20\par -Mitral valve prolapse involving the posterior mitral leaflet with calcified chordae\par -Severe eccentric mitral regurgitation \par -Thickened aortic valve with calcified non coronary cusp \par -Mildly dilated left atrium.LA volume index 36 cc/m2 \par -Normal  left ventricular interna; systolic function. No segmental wall motion abnormalities \par -Moderate diastolic dysfunction (stage II \par -Note LVEF may be overestimated given severity of mitral valve regurgitation

## 2021-04-28 NOTE — HISTORY OF PRESENT ILLNESS
[FreeTextEntry1] : Mr. Palmer is a 65 year old male referred by Dr. Desouza for evaluation of Severe Mitral Regurgitation. He has a a past medical history significant for Hypertension, Hyperlipidemia, Diabetes Mellitus, HCV s/p treatment and Cardiac Murmur. He has known Hepatitis C and Cirrhosis and is seen by Dr. Nelson. \par \par He returns to the office today for follow up discussion after transesophageal echocardiogram was obtained and possible mitral clip candidacy. He was recently discharged from the hospital with pneumonia and is currently on PO antibiotics. He usually very active in the gym without symptoms of shortness of breath on exertion. QUINCY with severe mitral regurgitation and prolapse.

## 2021-04-28 NOTE — ASSESSMENT
[FreeTextEntry1] : Mr. Palmer is a 66 year old male with severe asymptomatic mitral regurgitation, previous coronary stent. He is currently asymptomatic and at this time there is no indication for mitral clipping. he will return to clinic in 6 months for evaluation, or earlier if he has symptoms. \par \par \par I thank you for the opportunity to participate in the care of your patients. Please do not hesitate to contact me should you have any questions.\par

## 2021-04-28 NOTE — CONSULT LETTER
[FreeTextEntry2] : Adam Desouza MD [FreeTextEntry3] : Wellington Islas MD\par  of Cardiothoracic Surgery\par [facility]\par [address] \[place] [address]\par [phone]\par

## 2021-04-29 ENCOUNTER — APPOINTMENT (OUTPATIENT)
Dept: CT IMAGING | Facility: CLINIC | Age: 66
End: 2021-04-29
Payer: MEDICARE

## 2021-04-29 PROCEDURE — 71250 CT THORAX DX C-: CPT | Mod: MH

## 2021-05-07 ENCOUNTER — APPOINTMENT (OUTPATIENT)
Dept: CARDIOLOGY | Facility: CLINIC | Age: 66
End: 2021-05-07
Payer: MEDICARE

## 2021-05-07 ENCOUNTER — NON-APPOINTMENT (OUTPATIENT)
Age: 66
End: 2021-05-07

## 2021-05-07 VITALS
WEIGHT: 182 LBS | OXYGEN SATURATION: 98 % | HEART RATE: 93 BPM | BODY MASS INDEX: 29.25 KG/M2 | TEMPERATURE: 97.1 F | DIASTOLIC BLOOD PRESSURE: 74 MMHG | SYSTOLIC BLOOD PRESSURE: 138 MMHG | HEIGHT: 66 IN

## 2021-05-07 PROCEDURE — 99214 OFFICE O/P EST MOD 30 MIN: CPT

## 2021-06-01 ENCOUNTER — APPOINTMENT (OUTPATIENT)
Dept: ULTRASOUND IMAGING | Facility: CLINIC | Age: 66
End: 2021-06-01
Payer: MEDICARE

## 2021-06-01 ENCOUNTER — RESULT REVIEW (OUTPATIENT)
Age: 66
End: 2021-06-01

## 2021-06-01 PROCEDURE — 76700 US EXAM ABDOM COMPLETE: CPT

## 2021-06-02 ENCOUNTER — LABORATORY RESULT (OUTPATIENT)
Age: 66
End: 2021-06-02

## 2021-06-29 ENCOUNTER — APPOINTMENT (OUTPATIENT)
Dept: OPHTHALMOLOGY | Facility: CLINIC | Age: 66
End: 2021-06-29
Payer: MEDICARE

## 2021-06-29 ENCOUNTER — NON-APPOINTMENT (OUTPATIENT)
Age: 66
End: 2021-06-29

## 2021-06-29 PROCEDURE — 92134 CPTRZ OPH DX IMG PST SGM RTA: CPT

## 2021-06-29 PROCEDURE — 92012 INTRM OPH EXAM EST PATIENT: CPT

## 2021-07-03 ENCOUNTER — EMERGENCY (EMERGENCY)
Facility: HOSPITAL | Age: 66
LOS: 1 days | End: 2021-07-03
Admitting: EMERGENCY MEDICINE
Payer: MEDICARE

## 2021-07-03 DIAGNOSIS — Z98.89 OTHER SPECIFIED POSTPROCEDURAL STATES: Chronic | ICD-10-CM

## 2021-07-03 DIAGNOSIS — Z98.1 ARTHRODESIS STATUS: Chronic | ICD-10-CM

## 2021-07-03 DIAGNOSIS — Z98.890 OTHER SPECIFIED POSTPROCEDURAL STATES: Chronic | ICD-10-CM

## 2021-07-03 PROCEDURE — 99283 EMERGENCY DEPT VISIT LOW MDM: CPT

## 2021-07-03 PROCEDURE — 73630 X-RAY EXAM OF FOOT: CPT | Mod: 26,RT

## 2021-07-05 ENCOUNTER — OUTPATIENT (OUTPATIENT)
Dept: OUTPATIENT SERVICES | Facility: HOSPITAL | Age: 66
LOS: 1 days | End: 2021-07-05

## 2021-07-05 DIAGNOSIS — Z98.89 OTHER SPECIFIED POSTPROCEDURAL STATES: Chronic | ICD-10-CM

## 2021-07-05 DIAGNOSIS — Z98.890 OTHER SPECIFIED POSTPROCEDURAL STATES: Chronic | ICD-10-CM

## 2021-07-05 DIAGNOSIS — Z98.1 ARTHRODESIS STATUS: Chronic | ICD-10-CM

## 2021-07-14 ENCOUNTER — APPOINTMENT (OUTPATIENT)
Dept: ULTRASOUND IMAGING | Facility: CLINIC | Age: 66
End: 2021-07-14
Payer: MEDICARE

## 2021-07-14 PROCEDURE — 76882 US LMTD JT/FCL EVL NVASC XTR: CPT | Mod: RT

## 2021-07-15 ENCOUNTER — APPOINTMENT (OUTPATIENT)
Dept: HEPATOLOGY | Facility: CLINIC | Age: 66
End: 2021-07-15
Payer: MEDICARE

## 2021-07-15 VITALS
SYSTOLIC BLOOD PRESSURE: 136 MMHG | RESPIRATION RATE: 16 BRPM | TEMPERATURE: 98 F | BODY MASS INDEX: 28.45 KG/M2 | WEIGHT: 177 LBS | HEIGHT: 66 IN | DIASTOLIC BLOOD PRESSURE: 85 MMHG | HEART RATE: 94 BPM

## 2021-07-15 PROCEDURE — 99214 OFFICE O/P EST MOD 30 MIN: CPT

## 2021-07-15 RX ORDER — SILDENAFIL CITRATE 100 MG/1
100 TABLET, FILM COATED ORAL
Qty: 6 | Refills: 0 | Status: DISCONTINUED | COMMUNITY
Start: 2018-02-13 | End: 2021-07-15

## 2021-08-05 ENCOUNTER — APPOINTMENT (OUTPATIENT)
Dept: CT IMAGING | Facility: CLINIC | Age: 66
End: 2021-08-05
Payer: MEDICARE

## 2021-08-05 PROCEDURE — 71250 CT THORAX DX C-: CPT | Mod: MH

## 2021-08-09 ENCOUNTER — APPOINTMENT (OUTPATIENT)
Dept: CARDIOLOGY | Facility: CLINIC | Age: 66
End: 2021-08-09
Payer: MEDICARE

## 2021-08-09 VITALS
HEIGHT: 66 IN | TEMPERATURE: 97 F | SYSTOLIC BLOOD PRESSURE: 128 MMHG | OXYGEN SATURATION: 97 % | DIASTOLIC BLOOD PRESSURE: 74 MMHG | WEIGHT: 179 LBS | HEART RATE: 67 BPM | BODY MASS INDEX: 28.77 KG/M2

## 2021-08-09 PROCEDURE — 99215 OFFICE O/P EST HI 40 MIN: CPT

## 2021-09-13 ENCOUNTER — APPOINTMENT (OUTPATIENT)
Dept: CARDIOLOGY | Facility: CLINIC | Age: 66
End: 2021-09-13
Payer: MEDICARE

## 2021-09-13 PROCEDURE — 93306 TTE W/DOPPLER COMPLETE: CPT

## 2021-09-21 ENCOUNTER — NON-APPOINTMENT (OUTPATIENT)
Age: 66
End: 2021-09-21

## 2021-09-21 ENCOUNTER — APPOINTMENT (OUTPATIENT)
Dept: OPHTHALMOLOGY | Facility: CLINIC | Age: 66
End: 2021-09-21
Payer: MEDICARE

## 2021-09-21 PROCEDURE — 92012 INTRM OPH EXAM EST PATIENT: CPT

## 2021-09-27 ENCOUNTER — APPOINTMENT (OUTPATIENT)
Dept: CARDIOTHORACIC SURGERY | Facility: CLINIC | Age: 66
End: 2021-09-27
Payer: MEDICARE

## 2021-09-27 VITALS
OXYGEN SATURATION: 96 % | BODY MASS INDEX: 28.93 KG/M2 | TEMPERATURE: 98.4 F | SYSTOLIC BLOOD PRESSURE: 130 MMHG | HEIGHT: 66 IN | DIASTOLIC BLOOD PRESSURE: 70 MMHG | WEIGHT: 180 LBS | HEART RATE: 87 BPM

## 2021-09-27 DIAGNOSIS — B18.2 CHRONIC VIRAL HEPATITIS C: ICD-10-CM

## 2021-09-27 PROCEDURE — 99213 OFFICE O/P EST LOW 20 MIN: CPT

## 2021-09-27 NOTE — PHYSICAL EXAM
[Sclera] : the sclera and conjunctiva were normal [Neck Appearance] : the appearance of the neck was normal [] : no respiratory distress [Respiration, Rhythm And Depth] : normal respiratory rhythm and effort [Apical Impulse] : the apical impulse was normal [Heart Sounds] : normal S1 and S2 [FreeTextEntry1] : III/VI systolic murmur at the apex [Arterial Pulses Carotid] : carotid pulses were normal with no bruits [Arterial Pulses Femoral] : femoral pulses were normal without bruits [Bowel Sounds] : normal bowel sounds [Abdomen Soft] : soft [Cervical Lymph Nodes Enlarged Posterior Bilaterally] : posterior cervical [No CVA Tenderness] : no ~M costovertebral angle tenderness [No Spinal Tenderness] : no spinal tenderness [Cranial Nerves] : cranial nerves 2-12 were intact [Oriented To Time, Place, And Person] : oriented to person, place, and time [Affect] : the affect was normal

## 2021-09-27 NOTE — DATA REVIEWED
[FreeTextEntry1] : Transthoracic Echocardiogram from 9/13/21 at [facility]\par - LVEF 45-50%\par - Bileaflet mitral valve prolapse. Moderate eccentric posterior mitral regurgitation\par - Thickened aortic valve with calcified NCC. No regurgitation\par \par \par Transesophageal Echocardiogram [facility] on 3/26/21\par - LVEF 60-65%\par - Degenerative posterior mitral leaflet with prolapsed and partially flailed P2 segment, there is calcific lesion immediately below the posterior leaflet. Severe eccentric mitral regurgitation noted. Jet is anteriorly directed with Coanda effect, systolic pulmonary veins reversal noted as well. Mean mitral valve gradient of 2.3 mmHg\par - Sclerotic aortic valve with normal opening, CHAS 2.59 cm2\par - Small pericardial effusion adjacent to the RV

## 2021-09-27 NOTE — ASSESSMENT
[FreeTextEntry1] : Mr. Palmer has moderate MR on last echocardiogram with no symptoms of shortness of breath or chest pain or leg edema. At this time he does not have an indication for mitral clipping. I plan to see him in one year after a repeat echocardiogram. he was instructed to return earlier should he have any symptoms. \par \par \par I thank you for the opportunity to participate in the care of your patients. Please do not hesitate to contact me should you have any questions.\par

## 2021-09-27 NOTE — HISTORY OF PRESENT ILLNESS
[FreeTextEntry1] : Mr. Haynes is a 65 year old male referred by Dr. Desouza for evaluation of Severe Mitral Regurgitation. He has a a past medical history significant for Hypertension, Hyperlipidemia, Diabetes Mellitus, HCV s/p treatment and Cardiac Murmur. He has known Hepatitis C and Cirrhosis and is seen by Dr. Nelson. \par \par He returns to the office today for follow up care from his visit in April. He has been evaluated by Dr Desouza recently and the option for MitraClip was discussed again. He has a recent echocardiogram which demonstrated moderate mitral regurgitation. <kimi haynes has no complaints of shortness of breath or chest pain and claims to be on tread mill for one hour with no symptoms. \par \par

## 2021-09-27 NOTE — CONSULT LETTER
[Dear  ___] : Dear  [unfilled], [Courtesy Letter:] : I had the pleasure of seeing your patient, [unfilled], in my office today. [Please see my note below.] : Please see my note below. [Consult Closing:] : Thank you very much for allowing me to participate in the care of this patient.  If you have any questions, please do not hesitate to contact me. [Sincerely,] : Sincerely, [FreeTextEntry2] : Adam Desouza MD [FreeTextEntry3] : Wellington Islas MD\par  of Cardiothoracic Surgery\par [facility]\par [address] \[place] [address]\par [phone]\par

## 2021-09-30 ENCOUNTER — NON-APPOINTMENT (OUTPATIENT)
Age: 66
End: 2021-09-30

## 2021-10-05 ENCOUNTER — OUTPATIENT (OUTPATIENT)
Dept: OUTPATIENT SERVICES | Facility: HOSPITAL | Age: 66
LOS: 1 days | End: 2021-10-05

## 2021-10-05 DIAGNOSIS — Z98.89 OTHER SPECIFIED POSTPROCEDURAL STATES: Chronic | ICD-10-CM

## 2021-10-05 DIAGNOSIS — Z98.890 OTHER SPECIFIED POSTPROCEDURAL STATES: Chronic | ICD-10-CM

## 2021-10-05 DIAGNOSIS — Z98.1 ARTHRODESIS STATUS: Chronic | ICD-10-CM

## 2021-10-18 ENCOUNTER — APPOINTMENT (OUTPATIENT)
Dept: DISASTER EMERGENCY | Facility: CLINIC | Age: 66
End: 2021-10-18

## 2021-10-20 ENCOUNTER — FORM ENCOUNTER (OUTPATIENT)
Age: 66
End: 2021-10-20

## 2021-10-20 VITALS
SYSTOLIC BLOOD PRESSURE: 162 MMHG | DIASTOLIC BLOOD PRESSURE: 84 MMHG | OXYGEN SATURATION: 98 % | RESPIRATION RATE: 20 BRPM | HEIGHT: 66 IN | WEIGHT: 177.91 LBS | HEART RATE: 85 BPM

## 2021-10-20 LAB — SARS-COV-2 N GENE NPH QL NAA+PROBE: NOT DETECTED

## 2021-10-20 RX ORDER — CHLORHEXIDINE GLUCONATE 213 G/1000ML
1 SOLUTION TOPICAL ONCE
Refills: 0 | Status: DISCONTINUED | OUTPATIENT
Start: 2021-10-21 | End: 2021-11-04

## 2021-10-20 NOTE — H&P PST ADULT - HISTORY OF PRESENT ILLNESS
Narrative:     66 y9o male with PMHX of HTN, HLD, DM, HCV s/p treatment of cardiac murmur, hepatitis C and cirrhosis follows with Dr. Nelson. He follows with Dr. Parker in regards to his known hx of moderate MR for which he is now being evaluated for Mitral Clip. During office visit patient denied chest pain, sob and endorses able to utilize the thread mill for one hour without symptoms. Patient presents today for evaluation of severity of MR in setting of being evaluated for mitral clip.         Symptoms:        Angina (Class):        Ischemic Symptoms:     Heart Failure:        Systolic/Diastolic/Combined:        NYHA Class (within 2 weeks):     Assessment of LVEF (Must be within 6 months):       EF:        Assessed by:        Date:     Prior Cardiac Interventions (LHC, stents, CABG):       PCI's (Date, Stents, Vessels):        CABG (Date, Grafts):     Noninvasive Testing:   Stress Test: Date:        Protocol:        Duration of Exercise:        Symptoms:        EKG Changes:        DTS:        Myocardial Imaging:        Risk Assessment (Low, Medium, High):     Echo (Date, Findings):     Antianginal Therapies:        Beta Blockers:         Calcium Channel Blockers:        Long Acting Nitrates:        Ranexa:     Associated Risk Factors:        Cerebrovascular Disease: N/A       Chronic Lung Disease: N/A       Peripheral Arterial Disease: N/A       Chronic Kidney Disease (if yes, what is GFR): N/A       Uncontrolled Diabetes (if yes, what is HgbA1C or FBS): N/A       Poorly Controlled Hypertension (if yes, what is SBP): N/A       Morbid Obesity (if yes, what is BMI): N/A       History of Recent Ventricular Arrhythmia: N/A       Inability to Ambulate Safely: N/A       Need for Therapeutic Anticoagulation: N/A       Antiplatelet or Contrast Allergy: N/A Narrative:     66 y9o male with PMHX of HTN, HLD, DM, HCV s/p treatment of cardiac murmur, hepatitis C and cirrhosis follows with Dr. Nelson. He follows with Dr. Parker in regards to his known hx of moderate MR for which he is now being evaluated for Mitral Clip. During office visit patient denied chest pain, sob and endorses able to utilize the thread mill for one hour without symptoms. Patient presents today for evaluation of severity of MR in setting of being evaluated for mitral clip.         Symptoms:       Ischemic Symptoms: Dyspnea    Heart Failure: No    Assessment of LVEF (Must be within 6 months): 6/19/2020       EF: 45-50%       Assessed by: TTE       Date:     Prior Cardiac Interventions (LHC, stents, CABG):       PCI's (Date, Stents, Vessels): 2/16/2021 One SONG to the 80% occ in the mid RCA with MAYE 3.5 x26mm SONG. mLAD 50% diffuse stenisis; pRCA diffuse 30% stenosis       CABG (Date, Grafts): NA      Noninvasive Testing:   Stress Test: Date: NA      Echo (Date, Findings): 6/19/2020  Bileaflet MV prolapse. Mod eccentric posterior mitral regurgitation  Thickened aortic valve, no regurgitation seen.   Mild dilated LA.   LV dimensions normal  Mild global LV systolic dysfunction (stage I)  Est RV sys pressure 17mm HG RA pressure 3 mm Hg consitent with normal pulmonary HTN.  Normal tricuspid valve. Mininal-mild tricuspid regitation.  Normal pulmonic valve. No pulmonic regurgitation    Antianginal Therapies:        Beta Blockers:  Toprol  mg daily       Calcium Channel Blockers:        Long Acting Nitrates:        Ranexa:     Associated Risk Factors:        Cerebrovascular Disease: N/A       Chronic Lung Disease: N/A       Peripheral Arterial Disease: N/A       Chronic Kidney Disease (if yes, what is GFR): N/A       Uncontrolled Diabetes (if yes, what is HgbA1C or FBS): N/A       Poorly Controlled Hypertension (if yes, what is SBP): N/A       Morbid Obesity (if yes, what is BMI): N/A       History of Recent Ventricular Arrhythmia: N/A       Inability to Ambulate Safely: N/A       Need for Therapeutic Anticoagulation: N/A       Antiplatelet or Contrast Allergy: N/A Narrative:     66 y9o male with PMHX of HTN, HLD, DM, HCV s/p treatment of cardiac murmur, hepatitis C and cirrhosis follows with Dr. Nelson. He follows with Dr. Parker in regards to his known hx of moderate MR for which he is now being evaluated for Mitral Clip. During office visit patient denied chest pain, sob and endorses able to utilize the thread mill for one hour without symptoms. Patient presents today for evaluation of severity of MR in setting of being evaluated for mitral clip.         Symptoms:       Ischemic Symptoms: Dyspnea    Heart Failure: No    Assessment of LVEF (Must be within 6 months): 6/19/2020       EF: 45-50%       Assessed by: TTE       Date: 6/19/2020    Prior Cardiac Interventions (LHC, stents, CABG):       PCI's (Date, Stents, Vessels): 2/16/2021 One SONG to the 80% occ in the mid RCA with MAYE 3.5 x26mm SONG. mLAD 50% diffuse stenisis; pRCA diffuse 30% stenosis       CABG (Date, Grafts): NA      Noninvasive Testing:   Stress Test: Date: NA      Echo (Date, Findings): 6/19/2020  Bileaflet MV prolapse. Mod eccentric posterior mitral regurgitation  Thickened aortic valve, no regurgitation seen.   Mild dilated LA.   LV dimensions normal  Mild global LV systolic dysfunction (stage I)  Est RV sys pressure 17mm HG RA pressure 3 mm Hg consitent with normal pulmonary HTN.  Normal tricuspid valve. Mininal-mild tricuspid regitation.  Normal pulmonic valve. No pulmonic regurgitation       < from: QUINCY Echo Doppler (03.26.21 @ 09:24) >  Summary:   1. Normal global left ventricular systolic function.   2. Left ventricular ejection fraction, by visual estimation, is 60 to 65% (by 3D 61%).   3. The left ventricular diastolic function could not be assessed in this study.   4. Normal RV size and function, inadequate estimation of RVSP.   5. Left atrial enlargement.   6. Degenerative posterior mitral valve leaflet (measured 0.8 cm) with prolapsed and partially flailed P2 segment, there is a calcific lesion (1.6 cm x 0.6 cm) immediately below the posterior leaflet. Severe eccentric mitral regurgitation noted, jet is anteriorly directed with Coanda effect, systolic pulmonary veins reversal noted as well. Mean mitral valve gradient of 2.3 mmHg (at HR of 88 bpm).   7. Mild tricuspid regurgitation.   8. Sclerotic aortic valve with normal opening, CHAS (by 2D-planimetry) 2.59 cm2.   9. Color flow doppler and intravenous injection of agitated saline demonstrates the presence of an intact intra atrial septum.  10. No left atrial appendage thrombus, left atrial appendage enlargement and normal left atrial appendage velocities.  11. Small pericardial effusion (0.5 cm) adjacent to the RV.  12. No prior QUINCY study is available for comparison.    Derian Hill DO Electronically signed on 3/26/2021 at 10:45:59 AM    < end of copied text >      Antianginal Therapies:        Beta Blockers:  Toprol  mg daily       Calcium Channel Blockers:        Long Acting Nitrates:        Ranexa:     Associated Risk Factors:        Cerebrovascular Disease: N/A       Chronic Lung Disease: N/A       Peripheral Arterial Disease: N/A       Chronic Kidney Disease (if yes, what is GFR): N/A       Uncontrolled Diabetes (if yes, what is HgbA1C or FBS): N/A       Poorly Controlled Hypertension (if yes, what is SBP): N/A       Morbid Obesity (if yes, what is BMI): N/A       History of Recent Ventricular Arrhythmia: N/A       Inability to Ambulate Safely: N/A       Need for Therapeutic Anticoagulation: N/A       Antiplatelet or Contrast Allergy: N/A

## 2021-10-20 NOTE — H&P PST ADULT - ASSESSMENT
66 y9o male with PMHX of HTN, HLD, DM, HCV s/p treatment of cardiac murmur, hepatitis C and cirrhosis follows with Dr. Nelson. He follows with Dr. Parker in regards to his known hx of moderate MR for which he is now being evaluated for Mitral Clip. During office visit patient denied chest pain, sob and endorses able to utilize the thread mill for one hour without symptoms. Patient presents today for evaluation of severity of MR in setting of being evaluated for mitral clip.       -pt has been NPO since MN  -labs, ekg ordered  -consent to be obtained by attending and anesthesia  -QUINCY order placed

## 2021-10-20 NOTE — H&P PST ADULT - NEGATIVE NEUROLOGICAL SYMPTOMS
no transient paralysis/no weakness/no paresthesias/no generalized seizures/no focal seizures/no syncope/no tremors/no vertigo/no headache

## 2021-10-21 ENCOUNTER — TRANSCRIPTION ENCOUNTER (OUTPATIENT)
Age: 66
End: 2021-10-21

## 2021-10-21 ENCOUNTER — OUTPATIENT (OUTPATIENT)
Dept: OUTPATIENT SERVICES | Facility: HOSPITAL | Age: 66
LOS: 1 days | End: 2021-10-21
Payer: MEDICARE

## 2021-10-21 DIAGNOSIS — Z98.890 OTHER SPECIFIED POSTPROCEDURAL STATES: Chronic | ICD-10-CM

## 2021-10-21 DIAGNOSIS — Z98.89 OTHER SPECIFIED POSTPROCEDURAL STATES: Chronic | ICD-10-CM

## 2021-10-21 DIAGNOSIS — Z98.1 ARTHRODESIS STATUS: Chronic | ICD-10-CM

## 2021-10-21 DIAGNOSIS — I34.0 NONRHEUMATIC MITRAL (VALVE) INSUFFICIENCY: ICD-10-CM

## 2021-10-21 LAB
ANION GAP SERPL CALC-SCNC: 13 MMOL/L — SIGNIFICANT CHANGE UP (ref 5–17)
BASOPHILS # BLD AUTO: 0.03 K/UL — SIGNIFICANT CHANGE UP (ref 0–0.2)
BASOPHILS NFR BLD AUTO: 0.4 % — SIGNIFICANT CHANGE UP (ref 0–2)
BUN SERPL-MCNC: 17.3 MG/DL — SIGNIFICANT CHANGE UP (ref 8–20)
CALCIUM SERPL-MCNC: 9.1 MG/DL — SIGNIFICANT CHANGE UP (ref 8.6–10.2)
CHLORIDE SERPL-SCNC: 100 MMOL/L — SIGNIFICANT CHANGE UP (ref 98–107)
CO2 SERPL-SCNC: 23 MMOL/L — SIGNIFICANT CHANGE UP (ref 22–29)
CREAT SERPL-MCNC: 0.9 MG/DL — SIGNIFICANT CHANGE UP (ref 0.5–1.3)
EOSINOPHIL # BLD AUTO: 0.29 K/UL — SIGNIFICANT CHANGE UP (ref 0–0.5)
EOSINOPHIL NFR BLD AUTO: 3.9 % — SIGNIFICANT CHANGE UP (ref 0–6)
GLUCOSE SERPL-MCNC: 218 MG/DL — HIGH (ref 70–99)
HCT VFR BLD CALC: 39.8 % — SIGNIFICANT CHANGE UP (ref 39–50)
HGB BLD-MCNC: 14.3 G/DL — SIGNIFICANT CHANGE UP (ref 13–17)
IMM GRANULOCYTES NFR BLD AUTO: 0.4 % — SIGNIFICANT CHANGE UP (ref 0–1.5)
LYMPHOCYTES # BLD AUTO: 2.49 K/UL — SIGNIFICANT CHANGE UP (ref 1–3.3)
LYMPHOCYTES # BLD AUTO: 33.8 % — SIGNIFICANT CHANGE UP (ref 13–44)
MCHC RBC-ENTMCNC: 31.5 PG — SIGNIFICANT CHANGE UP (ref 27–34)
MCHC RBC-ENTMCNC: 35.9 GM/DL — SIGNIFICANT CHANGE UP (ref 32–36)
MCV RBC AUTO: 87.7 FL — SIGNIFICANT CHANGE UP (ref 80–100)
MONOCYTES # BLD AUTO: 0.89 K/UL — SIGNIFICANT CHANGE UP (ref 0–0.9)
MONOCYTES NFR BLD AUTO: 12.1 % — SIGNIFICANT CHANGE UP (ref 2–14)
NEUTROPHILS # BLD AUTO: 3.64 K/UL — SIGNIFICANT CHANGE UP (ref 1.8–7.4)
NEUTROPHILS NFR BLD AUTO: 49.4 % — SIGNIFICANT CHANGE UP (ref 43–77)
PLATELET # BLD AUTO: 400 K/UL — SIGNIFICANT CHANGE UP (ref 150–400)
POTASSIUM SERPL-MCNC: 4.5 MMOL/L — SIGNIFICANT CHANGE UP (ref 3.5–5.3)
POTASSIUM SERPL-SCNC: 4.5 MMOL/L — SIGNIFICANT CHANGE UP (ref 3.5–5.3)
RBC # BLD: 4.54 M/UL — SIGNIFICANT CHANGE UP (ref 4.2–5.8)
RBC # FLD: 13.3 % — SIGNIFICANT CHANGE UP (ref 10.3–14.5)
SODIUM SERPL-SCNC: 135 MMOL/L — SIGNIFICANT CHANGE UP (ref 135–145)
WBC # BLD: 7.37 K/UL — SIGNIFICANT CHANGE UP (ref 3.8–10.5)
WBC # FLD AUTO: 7.37 K/UL — SIGNIFICANT CHANGE UP (ref 3.8–10.5)

## 2021-10-21 PROCEDURE — 93325 DOPPLER ECHO COLOR FLOW MAPG: CPT | Mod: 26

## 2021-10-21 PROCEDURE — 93312 ECHO TRANSESOPHAGEAL: CPT

## 2021-10-21 PROCEDURE — 76376 3D RENDER W/INTRP POSTPROCES: CPT | Mod: 26

## 2021-10-21 PROCEDURE — 87040 BLOOD CULTURE FOR BACTERIA: CPT

## 2021-10-21 PROCEDURE — 93010 ELECTROCARDIOGRAM REPORT: CPT

## 2021-10-21 PROCEDURE — 36415 COLL VENOUS BLD VENIPUNCTURE: CPT

## 2021-10-21 PROCEDURE — 93320 DOPPLER ECHO COMPLETE: CPT | Mod: 26

## 2021-10-21 PROCEDURE — 80048 BASIC METABOLIC PNL TOTAL CA: CPT

## 2021-10-21 PROCEDURE — 85025 COMPLETE CBC W/AUTO DIFF WBC: CPT

## 2021-10-21 PROCEDURE — 93312 ECHO TRANSESOPHAGEAL: CPT | Mod: 26

## 2021-10-21 PROCEDURE — 93005 ELECTROCARDIOGRAM TRACING: CPT

## 2021-10-21 PROCEDURE — 93320 DOPPLER ECHO COMPLETE: CPT

## 2021-10-21 PROCEDURE — 93325 DOPPLER ECHO COLOR FLOW MAPG: CPT

## 2021-10-21 RX ORDER — METOPROLOL TARTRATE 50 MG
1 TABLET ORAL
Qty: 0 | Refills: 0 | DISCHARGE

## 2021-10-21 NOTE — DISCHARGE NOTE PROVIDER - NSDCCPCAREPLAN_GEN_ALL_CORE_FT
PRINCIPAL DISCHARGE DIAGNOSIS  Diagnosis: H/O mitral valve prolapse  Assessment and Plan of Treatment:

## 2021-10-21 NOTE — CHART NOTE - NSCHARTNOTEFT_GEN_A_CORE
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

## 2021-10-21 NOTE — DISCHARGE NOTE PROVIDER - NSDCMRMEDTOKEN_GEN_ALL_CORE_FT
amlodipine-valsartan 10 mg-320 mg oral tablet: 1 tab(s) orally once a day  aspirin 81 mg oral delayed release tablet: 1 tab(s) orally once a day  ATORVASTATIN 80 MG TABLET:   CLOPIDOGREL 75 MG TABLET:   FREESTYLE BLAIRE 14 DAY SENSOR: APPLY AS DIRECTED EVERY 14 DAYS  hydroCHLOROthiazide 12.5 mg oral tablet: 1 tab(s) orally once a day  HYDROCODONE-ACETAMIN  MG: TAKE 1 TABLET BY MOUTH EVERY 4 TO 6 HOURS AS NEEDED FOR PAIN  Insulin Lispro KwikPen 100 units/mL injectable solution: 72/25  12units   methIMAzole 5 mg oral tablet: orally every 48 hours  Metoprolol Succinate  mg oral tablet, extended release: 1 tab(s) orally once a day  Trulicity Pen 0.75 mg/0.5 mL subcutaneous solution:

## 2021-10-21 NOTE — DISCHARGE NOTE PROVIDER - CARE PROVIDER_API CALL
Adam Desouza)  Cardiology  [address] [place] [address]  Phone: [phone]  Fax: [phone]  Follow Up Time: 2 weeks

## 2021-10-21 NOTE — DISCHARGE NOTE PROVIDER - NS AS DC PROVIDER CONTACT Y/N MULTI
Yes

## 2021-10-21 NOTE — DISCHARGE NOTE NURSING/CASE MANAGEMENT/SOCIAL WORK - PATIENT PORTAL LINK FT
You can access the FollowMyHealth Patient Portal offered by [facility] by registering at the following website: [email or web address]. By joining [facility]’s FollowMyHealth portal, you will also be able to view your health information using other applications (apps) compatible with our system.

## 2021-10-21 NOTE — DISCHARGE NOTE PROVIDER - NSDCCPTREATMENT_GEN_ALL_CORE_FT
PRINCIPAL PROCEDURE  Procedure: Transesophageal echocardiography (QUINCY)  Findings and Treatment:

## 2021-10-21 NOTE — DISCHARGE NOTE PROVIDER - HOSPITAL COURSE
66 y9o male with PMHX of HTN, HLD, DM, HCV s/p treatment of cardiac murmur, hepatitis C and cirrhosis follows with Dr. Nelson. He follows with Dr. Parker in regards to his known hx of moderate MR for which he is now being evaluated for Mitral Clip. During office visit patient denied chest pain, sob and endorses able to utilize the thread mill for one hour without symptoms. Patient presents today for evaluation of severity of MR in setting of being evaluated for mitral clip.     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

## 2021-10-26 LAB
CULTURE RESULTS: SIGNIFICANT CHANGE UP
CULTURE RESULTS: SIGNIFICANT CHANGE UP
SPECIMEN SOURCE: SIGNIFICANT CHANGE UP
SPECIMEN SOURCE: SIGNIFICANT CHANGE UP

## 2021-11-08 ENCOUNTER — APPOINTMENT (OUTPATIENT)
Dept: CARDIOLOGY | Facility: CLINIC | Age: 66
End: 2021-11-08
Payer: MEDICARE

## 2021-11-08 ENCOUNTER — NON-APPOINTMENT (OUTPATIENT)
Age: 66
End: 2021-11-08

## 2021-11-08 VITALS
HEIGHT: 66 IN | WEIGHT: 179 LBS | OXYGEN SATURATION: 98 % | TEMPERATURE: 97.6 F | DIASTOLIC BLOOD PRESSURE: 70 MMHG | BODY MASS INDEX: 28.77 KG/M2 | SYSTOLIC BLOOD PRESSURE: 110 MMHG | HEART RATE: 90 BPM

## 2021-11-08 PROCEDURE — 99214 OFFICE O/P EST MOD 30 MIN: CPT

## 2021-11-08 PROCEDURE — 93000 ELECTROCARDIOGRAM COMPLETE: CPT

## 2021-11-08 RX ORDER — METHIMAZOLE 5 MG/1
5 TABLET ORAL
Qty: 15 | Refills: 0 | Status: DISCONTINUED | COMMUNITY
Start: 2018-02-23 | End: 2021-11-08

## 2021-11-09 ENCOUNTER — NON-APPOINTMENT (OUTPATIENT)
Age: 66
End: 2021-11-09

## 2021-11-30 ENCOUNTER — NON-APPOINTMENT (OUTPATIENT)
Age: 66
End: 2021-11-30

## 2021-11-30 ENCOUNTER — APPOINTMENT (OUTPATIENT)
Dept: OPHTHALMOLOGY | Facility: CLINIC | Age: 66
End: 2021-11-30
Payer: MEDICARE

## 2021-11-30 PROCEDURE — 92012 INTRM OPH EXAM EST PATIENT: CPT

## 2021-12-06 ENCOUNTER — LABORATORY RESULT (OUTPATIENT)
Age: 66
End: 2021-12-06

## 2021-12-07 ENCOUNTER — APPOINTMENT (OUTPATIENT)
Dept: ULTRASOUND IMAGING | Facility: CLINIC | Age: 66
End: 2021-12-07
Payer: MEDICARE

## 2021-12-07 PROCEDURE — 76700 US EXAM ABDOM COMPLETE: CPT

## 2022-01-06 ENCOUNTER — OUTPATIENT (OUTPATIENT)
Dept: OUTPATIENT SERVICES | Facility: HOSPITAL | Age: 67
LOS: 1 days | End: 2022-01-06

## 2022-01-06 DIAGNOSIS — Z98.89 OTHER SPECIFIED POSTPROCEDURAL STATES: Chronic | ICD-10-CM

## 2022-01-06 DIAGNOSIS — Z98.890 OTHER SPECIFIED POSTPROCEDURAL STATES: Chronic | ICD-10-CM

## 2022-01-06 DIAGNOSIS — Z98.1 ARTHRODESIS STATUS: Chronic | ICD-10-CM

## 2022-02-21 ENCOUNTER — APPOINTMENT (OUTPATIENT)
Dept: MRI IMAGING | Facility: CLINIC | Age: 67
End: 2022-02-21
Payer: MEDICARE

## 2022-02-21 ENCOUNTER — APPOINTMENT (OUTPATIENT)
Dept: ULTRASOUND IMAGING | Facility: CLINIC | Age: 67
End: 2022-02-21

## 2022-02-21 PROCEDURE — 76536 US EXAM OF HEAD AND NECK: CPT

## 2022-02-21 PROCEDURE — 72148 MRI LUMBAR SPINE W/O DYE: CPT

## 2022-03-01 ENCOUNTER — APPOINTMENT (OUTPATIENT)
Dept: OPHTHALMOLOGY | Facility: CLINIC | Age: 67
End: 2022-03-01
Payer: MEDICARE

## 2022-03-01 ENCOUNTER — NON-APPOINTMENT (OUTPATIENT)
Age: 67
End: 2022-03-01

## 2022-03-01 PROCEDURE — 92134 CPTRZ OPH DX IMG PST SGM RTA: CPT

## 2022-04-01 ENCOUNTER — OUTPATIENT (OUTPATIENT)
Dept: OUTPATIENT SERVICES | Facility: HOSPITAL | Age: 67
LOS: 1 days | End: 2022-04-01

## 2022-04-01 DIAGNOSIS — E05.90 THYROTOXICOSIS, UNSPECIFIED WITHOUT THYROTOXIC CRISIS OR STORM: ICD-10-CM

## 2022-04-01 DIAGNOSIS — Z98.89 OTHER SPECIFIED POSTPROCEDURAL STATES: Chronic | ICD-10-CM

## 2022-04-01 DIAGNOSIS — Z98.890 OTHER SPECIFIED POSTPROCEDURAL STATES: Chronic | ICD-10-CM

## 2022-04-01 DIAGNOSIS — Z98.1 ARTHRODESIS STATUS: Chronic | ICD-10-CM

## 2022-04-13 ENCOUNTER — APPOINTMENT (OUTPATIENT)
Dept: HEPATOLOGY | Facility: CLINIC | Age: 67
End: 2022-04-13
Payer: MEDICARE

## 2022-04-13 VITALS
HEART RATE: 87 BPM | DIASTOLIC BLOOD PRESSURE: 85 MMHG | OXYGEN SATURATION: 98 % | HEIGHT: 66 IN | RESPIRATION RATE: 16 BRPM | SYSTOLIC BLOOD PRESSURE: 150 MMHG | TEMPERATURE: 97.5 F | BODY MASS INDEX: 28.93 KG/M2 | WEIGHT: 180 LBS

## 2022-04-13 LAB
BASOPHILS # BLD AUTO: 0.03 K/UL
BASOPHILS NFR BLD AUTO: 0.5 %
EOSINOPHIL # BLD AUTO: 0.24 K/UL
EOSINOPHIL NFR BLD AUTO: 3.9 %
HCT VFR BLD CALC: 41.6 %
HGB BLD-MCNC: 14 G/DL
IMM GRANULOCYTES NFR BLD AUTO: 0.2 %
LYMPHOCYTES # BLD AUTO: 2.19 K/UL
LYMPHOCYTES NFR BLD AUTO: 35.5 %
MAN DIFF?: NORMAL
MCHC RBC-ENTMCNC: 29.7 PG
MCHC RBC-ENTMCNC: 33.7 GM/DL
MCV RBC AUTO: 88.3 FL
MONOCYTES # BLD AUTO: 0.68 K/UL
MONOCYTES NFR BLD AUTO: 11 %
NEUTROPHILS # BLD AUTO: 3.02 K/UL
NEUTROPHILS NFR BLD AUTO: 48.9 %
PLATELET # BLD AUTO: 279 K/UL
RBC # BLD: 4.71 M/UL
RBC # FLD: 14 %
WBC # FLD AUTO: 6.17 K/UL

## 2022-04-13 PROCEDURE — 99213 OFFICE O/P EST LOW 20 MIN: CPT

## 2022-04-13 PROCEDURE — 91200 LIVER ELASTOGRAPHY: CPT

## 2022-04-13 RX ORDER — CLOPIDOGREL BISULFATE 75 MG/1
75 TABLET, FILM COATED ORAL DAILY
Qty: 90 | Refills: 3 | Status: DISCONTINUED | COMMUNITY
Start: 2021-02-19 | End: 2022-04-13

## 2022-04-14 LAB
AFP-TM SERPL-MCNC: <1.8 NG/ML
ALBUMIN SERPL ELPH-MCNC: 4.5 G/DL
ALP BLD-CCNC: 77 U/L
ALT SERPL-CCNC: 24 U/L
ANION GAP SERPL CALC-SCNC: 16 MMOL/L
AST SERPL-CCNC: 21 U/L
BILIRUB SERPL-MCNC: 0.8 MG/DL
BUN SERPL-MCNC: 19 MG/DL
CALCIUM SERPL-MCNC: 9.6 MG/DL
CHLORIDE SERPL-SCNC: 101 MMOL/L
CO2 SERPL-SCNC: 24 MMOL/L
CREAT SERPL-MCNC: 1.01 MG/DL
EGFR: 82 ML/MIN/1.73M2
HCV RNA SERPL NAA+PROBE-LOG IU: NOT DETECTED LOGIU/ML
HEPC RNA INTERP: NOT DETECTED
POTASSIUM SERPL-SCNC: 4.3 MMOL/L
PROT SERPL-MCNC: 7.3 G/DL
SODIUM SERPL-SCNC: 141 MMOL/L

## 2022-05-09 ENCOUNTER — NON-APPOINTMENT (OUTPATIENT)
Age: 67
End: 2022-05-09

## 2022-05-09 ENCOUNTER — APPOINTMENT (OUTPATIENT)
Dept: CARDIOLOGY | Facility: CLINIC | Age: 67
End: 2022-05-09
Payer: MEDICARE

## 2022-05-09 VITALS
BODY MASS INDEX: 29.25 KG/M2 | WEIGHT: 182 LBS | DIASTOLIC BLOOD PRESSURE: 70 MMHG | HEART RATE: 82 BPM | SYSTOLIC BLOOD PRESSURE: 110 MMHG | TEMPERATURE: 97.1 F | OXYGEN SATURATION: 98 % | HEIGHT: 66 IN

## 2022-05-09 PROCEDURE — 93000 ELECTROCARDIOGRAM COMPLETE: CPT

## 2022-05-09 PROCEDURE — 99214 OFFICE O/P EST MOD 30 MIN: CPT

## 2022-05-24 ENCOUNTER — APPOINTMENT (OUTPATIENT)
Dept: CARDIOLOGY | Facility: CLINIC | Age: 67
End: 2022-05-24
Payer: MEDICARE

## 2022-05-24 PROCEDURE — 93306 TTE W/DOPPLER COMPLETE: CPT

## 2022-07-12 ENCOUNTER — APPOINTMENT (OUTPATIENT)
Dept: OPHTHALMOLOGY | Facility: CLINIC | Age: 67
End: 2022-07-12

## 2022-07-12 ENCOUNTER — NON-APPOINTMENT (OUTPATIENT)
Age: 67
End: 2022-07-12

## 2022-07-12 PROCEDURE — 92012 INTRM OPH EXAM EST PATIENT: CPT

## 2022-08-29 ENCOUNTER — OUTPATIENT (OUTPATIENT)
Dept: OUTPATIENT SERVICES | Facility: HOSPITAL | Age: 67
LOS: 1 days | End: 2022-08-29

## 2022-08-29 DIAGNOSIS — J43.9 EMPHYSEMA, UNSPECIFIED: ICD-10-CM

## 2022-08-29 DIAGNOSIS — Z98.890 OTHER SPECIFIED POSTPROCEDURAL STATES: Chronic | ICD-10-CM

## 2022-08-29 DIAGNOSIS — Z98.89 OTHER SPECIFIED POSTPROCEDURAL STATES: Chronic | ICD-10-CM

## 2022-08-29 DIAGNOSIS — Z98.1 ARTHRODESIS STATUS: Chronic | ICD-10-CM

## 2022-09-06 ENCOUNTER — APPOINTMENT (OUTPATIENT)
Dept: ULTRASOUND IMAGING | Facility: CLINIC | Age: 67
End: 2022-09-06

## 2022-09-06 ENCOUNTER — APPOINTMENT (OUTPATIENT)
Dept: CT IMAGING | Facility: CLINIC | Age: 67
End: 2022-09-06

## 2022-09-06 PROCEDURE — 71250 CT THORAX DX C-: CPT

## 2022-09-06 PROCEDURE — 76700 US EXAM ABDOM COMPLETE: CPT

## 2022-10-03 ENCOUNTER — APPOINTMENT (OUTPATIENT)
Dept: HEPATOLOGY | Facility: CLINIC | Age: 67
End: 2022-10-03

## 2022-10-11 ENCOUNTER — APPOINTMENT (OUTPATIENT)
Dept: OPHTHALMOLOGY | Facility: CLINIC | Age: 67
End: 2022-10-11

## 2022-10-11 ENCOUNTER — NON-APPOINTMENT (OUTPATIENT)
Age: 67
End: 2022-10-11

## 2022-10-11 PROCEDURE — 92012 INTRM OPH EXAM EST PATIENT: CPT

## 2022-10-19 ENCOUNTER — APPOINTMENT (OUTPATIENT)
Dept: HEPATOLOGY | Facility: CLINIC | Age: 67
End: 2022-10-19

## 2022-10-26 NOTE — DISCHARGE NOTE PROVIDER - REASON FOR ADMISSION
----- Message from Magdaleno Velasco sent at 4/6/2018 10:23 AM CDT -----  Contact: [phone]/PT  Calling TO speak with nurse to results   acute pancreatitis post ERCP English

## 2022-11-07 ENCOUNTER — APPOINTMENT (OUTPATIENT)
Dept: CARDIOLOGY | Facility: CLINIC | Age: 67
End: 2022-11-07

## 2022-11-07 ENCOUNTER — NON-APPOINTMENT (OUTPATIENT)
Age: 67
End: 2022-11-07

## 2022-11-07 VITALS
WEIGHT: 190 LBS | DIASTOLIC BLOOD PRESSURE: 64 MMHG | HEART RATE: 86 BPM | TEMPERATURE: 97.1 F | BODY MASS INDEX: 30.53 KG/M2 | SYSTOLIC BLOOD PRESSURE: 130 MMHG | OXYGEN SATURATION: 98 % | HEIGHT: 66 IN

## 2022-11-07 PROCEDURE — 93000 ELECTROCARDIOGRAM COMPLETE: CPT

## 2022-11-07 PROCEDURE — 99214 OFFICE O/P EST MOD 30 MIN: CPT | Mod: 25

## 2022-11-07 RX ORDER — ECONAZOLE NITRATE 10 MG/G
1 CREAM TOPICAL
Qty: 30 | Refills: 0 | Status: DISCONTINUED | COMMUNITY
Start: 2021-08-04 | End: 2022-11-07

## 2023-01-12 ENCOUNTER — APPOINTMENT (OUTPATIENT)
Dept: CARDIOLOGY | Facility: CLINIC | Age: 68
End: 2023-01-12
Payer: MEDICARE

## 2023-01-12 PROCEDURE — 93306 TTE W/DOPPLER COMPLETE: CPT

## 2023-03-02 ENCOUNTER — NON-APPOINTMENT (OUTPATIENT)
Age: 68
End: 2023-03-02

## 2023-03-02 ENCOUNTER — APPOINTMENT (OUTPATIENT)
Dept: OPHTHALMOLOGY | Facility: CLINIC | Age: 68
End: 2023-03-02
Payer: MEDICARE

## 2023-03-02 PROCEDURE — 92134 CPTRZ OPH DX IMG PST SGM RTA: CPT

## 2023-03-02 PROCEDURE — 67028 INJECTION EYE DRUG: CPT | Mod: LT

## 2023-03-17 ENCOUNTER — NON-APPOINTMENT (OUTPATIENT)
Age: 68
End: 2023-03-17

## 2023-03-17 ENCOUNTER — APPOINTMENT (OUTPATIENT)
Dept: OPHTHALMOLOGY | Facility: CLINIC | Age: 68
End: 2023-03-17
Payer: MEDICARE

## 2023-03-17 PROCEDURE — 92012 INTRM OPH EXAM EST PATIENT: CPT

## 2023-04-11 ENCOUNTER — APPOINTMENT (OUTPATIENT)
Dept: OPHTHALMOLOGY | Facility: CLINIC | Age: 68
End: 2023-04-11
Payer: MEDICARE

## 2023-04-11 ENCOUNTER — NON-APPOINTMENT (OUTPATIENT)
Age: 68
End: 2023-04-11

## 2023-04-11 PROCEDURE — 92012 INTRM OPH EXAM EST PATIENT: CPT

## 2023-04-11 PROCEDURE — 92134 CPTRZ OPH DX IMG PST SGM RTA: CPT

## 2023-05-09 ENCOUNTER — NON-APPOINTMENT (OUTPATIENT)
Age: 68
End: 2023-05-09

## 2023-05-09 ENCOUNTER — APPOINTMENT (OUTPATIENT)
Dept: OPHTHALMOLOGY | Facility: CLINIC | Age: 68
End: 2023-05-09
Payer: MEDICARE

## 2023-05-09 PROCEDURE — 92134 CPTRZ OPH DX IMG PST SGM RTA: CPT

## 2023-05-09 PROCEDURE — 92012 INTRM OPH EXAM EST PATIENT: CPT

## 2023-05-15 ENCOUNTER — NON-APPOINTMENT (OUTPATIENT)
Age: 68
End: 2023-05-15

## 2023-05-15 ENCOUNTER — APPOINTMENT (OUTPATIENT)
Dept: CARDIOLOGY | Facility: CLINIC | Age: 68
End: 2023-05-15
Payer: MEDICARE

## 2023-05-15 VITALS
SYSTOLIC BLOOD PRESSURE: 120 MMHG | OXYGEN SATURATION: 98 % | WEIGHT: 163 LBS | HEIGHT: 66 IN | BODY MASS INDEX: 26.2 KG/M2 | DIASTOLIC BLOOD PRESSURE: 70 MMHG | HEART RATE: 94 BPM

## 2023-05-15 PROCEDURE — 93000 ELECTROCARDIOGRAM COMPLETE: CPT

## 2023-05-15 PROCEDURE — 99214 OFFICE O/P EST MOD 30 MIN: CPT

## 2023-05-15 RX ORDER — HYDROCHLOROTHIAZIDE 12.5 MG/1
12.5 TABLET ORAL
Qty: 90 | Refills: 0 | Status: DISCONTINUED | COMMUNITY
Start: 2017-12-18 | End: 2023-05-15

## 2023-05-15 RX ORDER — PANTOPRAZOLE SODIUM 40 MG/1
40 TABLET, DELAYED RELEASE ORAL DAILY
Refills: 0 | Status: ACTIVE | COMMUNITY

## 2023-05-18 ENCOUNTER — APPOINTMENT (OUTPATIENT)
Dept: INFECTIOUS DISEASE | Facility: CLINIC | Age: 68
End: 2023-05-18
Payer: MEDICARE

## 2023-05-18 VITALS
WEIGHT: 165.2 LBS | TEMPERATURE: 98.2 F | HEART RATE: 98 BPM | SYSTOLIC BLOOD PRESSURE: 102 MMHG | BODY MASS INDEX: 26.55 KG/M2 | OXYGEN SATURATION: 99 % | HEIGHT: 66 IN | DIASTOLIC BLOOD PRESSURE: 58 MMHG

## 2023-05-18 PROCEDURE — 99214 OFFICE O/P EST MOD 30 MIN: CPT

## 2023-05-18 NOTE — PHYSICAL EXAM
[General Appearance - Alert] : alert [General Appearance - In No Acute Distress] : in no acute distress [Sclera] : the sclera and conjunctiva were normal [PERRL With Normal Accommodation] : pupils were equal in size, round, reactive to light [Extraocular Movements] : extraocular movements were intact [Outer Ear] : the ears and nose were normal in appearance [Oropharynx] : the oropharynx was normal with no thrush [Neck Appearance] : the appearance of the neck was normal [Neck Cervical Mass (___cm)] : no neck mass was observed [Jugular Venous Distention Increased] : there was no jugular-venous distention [Thyroid Diffuse Enlargement] : the thyroid was not enlarged [Auscultation Breath Sounds / Voice Sounds] : lungs were clear to auscultation bilaterally [Heart Rate And Rhythm] : heart rate was normal and rhythm regular [Heart Sounds] : normal S1 and S2 [Heart Sounds Gallop] : no gallops [Murmurs] : no murmurs [Heart Sounds Pericardial Friction Rub] : no pericardial rub [Full Pulse] : the pedal pulses are present [Edema] : there was no peripheral edema [Bowel Sounds] : normal bowel sounds [Abdomen Soft] : soft [Abdomen Tenderness] : non-tender [Abdomen Mass (___ Cm)] : no abdominal mass palpated [Costovertebral Angle Tenderness] : no CVA tenderness [No Palpable Adenopathy] : no palpable adenopathy [Musculoskeletal - Swelling] : no joint swelling [Nail Clubbing] : no clubbing  or cyanosis of the fingernails [Motor Tone] : muscle strength and tone were normal [Skin Color & Pigmentation] : normal skin color and pigmentation [] : no rash [Cranial Nerves] : cranial nerves 2-12 were intact [Sensation] : the sensory exam was normal to light touch and pinprick [No Focal Deficits] : no focal deficits [Oriented To Time, Place, And Person] : oriented to person, place, and time [Affect] : the affect was normal

## 2023-05-18 NOTE — HISTORY OF PRESENT ILLNESS
[FreeTextEntry1] : This 68-year-old man with dyslipidemia hypertension type 2 diabetes hypothyroidism hepatitis C chronic back pain severe mitral regurgitation with known valvular heart disease CAD status post stent x1 in 2021 who [place] [facility]  [place] on April 12, 2023.  In the ER he was noted to have creatinine elevation and troponin elevation he was admitted to the medical team for work-up ID was called for concern of intermittent fevers for 1 and half month the temperature max was 99.9 on admission.  Blood cultures from April 13, 2023 showed Streptococcus salivarius..  During the work-up in the hospital he was found with a torn chordae on QUINCY.  There was a high suspicion for endocarditis.  Repeat blood cultures were negative.  He was to continue a course of ceftriaxone 2 g every 24 hours for at least 4 weeks.  Ending about May 12, 2023.\par \par He was last seen in the hospital on April 19, 2023.  Since leaving the hospital, he completed his antibiotics.  The PICC line was removed on or about May 12, 2023.  He presents the office today May 18, 2023.  For follow-up.  He is with his wife.  He had recently seen Dr. Desouza of cardiology on May 15, 2023.\par \par His wife tells me that he initially started feeling sick at the time of having shingles shot in February.\par Currently feels well.  He has been eating in a stable pattern, on the diet that was recommended from the hospital.  He he tells me that his clothes are fitting better he wants to go back to the gym but needs to have some clearance from his doctors.\par He has no fevers, he has no chills.  the most recent labs were from 5/11/23: ESR was 23 and CRP was 35.\par \par The rest of the labs was reviewed on [facility] clinical viewer.\par \par Cardiology note reviewed, patient has Moderate-severe MR with flail leaflet. he has an upcoming appointment with Dr. Islas. \par  \par

## 2023-05-18 NOTE — ASSESSMENT
[FreeTextEntry1] : This 68 y.o. man\par hx of fevers\par found with Bacteremia, with Strep Salivarius in the hospital. \par QUINCY with flail mitral leaflet. \par he was treated with 4 weeks of Ceftriaxone, thru 5/12/23. \par PICC line removed. \par \par \par Clinically, he has been fully treated for his Streptococcus salivarius infection.\par At this time will defer further courses of antibiotics.  I have ordered the following:\par \par CBC, CMP, ESR, CRP, blood cultures x2 sets.\par \par Regarding his mitral regurgitation, he is to see Dr. Islas to discuss further options.\par \par He will be following with his primary care team regarding his diabetes management.\par \par He is curious about going back to the gym.  I have advised the patient to wait until he he has seen all his doctors before going back to the gym.

## 2023-05-18 NOTE — DATA REVIEWED
[FreeTextEntry1] : \par   Specimen Received Date & Time    05- 20:05  Order Number  [id number]  \par        Age at Time of Test    68 Years  \par \par \par  \par  \par   \par  WBC Count 9.15    K/uL  3.80 - 10.50 Final      \par  RBC Count 4.04  Low M/uL  4.20 - 5.80 Final      \par  Hemoglobin 11.8  Low g/dL  13.0 - 17.0 Final      \par  Hematocrit 38.2  Low %  39.0 - 50.0 Final      \par  MCV 94.6    fl  80.0 - 100.0 Final      \par  MCH 29.2    pg  27.0 - 34.0 Final      \par  MCHC 30.9  Low gm/dL  32.0 - 36.0 Final      \par  RCDW 15.6  High %  10.3 - 14.5 Final      \par  Platelet Count - Automated 425  High K/uL  150 - 400 Final      \par  Auto Neutrophil % 70.2    %  43.0 - 77.0 Final Differential percentages must be correlated with absolute numbers for clinical significance.    \par  Auto Lymphocyte % 15.3    %  13.0 - 44.0 Final      \par  Auto Monocyte % 8.6    %  2.0 - 14.0 Final      \par  Auto Eosinophil % 5.2    %  0.0 - 6.0 Final      \par  Auto Basophil % 0.3    %  0.0 - 2.0 Final      \par  Auto Immature Granulocyte % 0.4    %  0.0 - 0.9 Final (Includes meta, myelo and promyelocytes). Mild elevations in immature granulocytes may be seen with many inflammatory processes and pregnancy; clinical correlation suggested.    \par  Auto Neutrophil # 6.41    K/uL  1.80 - 7.40 Final      \par  Auto Lymphocyte # 1.40    K/uL  1.00 - 3.30 Final      \par  Auto Monocyte # 0.79    K/uL  0.00 - 0.90 Final      \par  Auto Eosinophil # 0.48    K/uL  0.00 - 0.50 Final      \par  Auto Basophil # 0.03    K/uL  0.00 - 0.20 Final    \par  \par \par Sodium, Serum 139    mmol/L  135 - 145 Final      \par  Potassium, Serum 4.5    mmol/L  3.5 - 5.3 Final      \par  Chloride, Serum 101    mmol/L  96 - 108 Final      \par  Carbon Dioxide, Serum 24    mmol/L  22 - 31 Final      \par  Anion Gap, Serum 13    mmol/L  5 - 17 Final      \par  Blood Urea Nitrogen, Serum 11    mg/dL  7 - 23 Final      \par  Creatinine, Serum 0.97    mg/dL  0.50 - 1.30 Final      \par  Glucose, Serum 175  High mg/dL  70 - 99 Final      \par  Calcium, Total Serum 9.5    mg/dL  8.4 - 10.5 Final      \par  Protein Total, Serum 7.3    g/dL  6.0 - 8.3 Final      \par  Albumin, Serum 4.1    g/dL  3.3 - 5.0 Final      \par  Bilirubin Total, Serum 0.7    mg/dL  0.2 - 1.2 Final      \par  Alkaline Phosphatase, Serum 89    U/L  40 - 120 Final      \par  Aspartate Aminotransferase (AST/SGOT) 21    U/L  10 - 40 Final      \par  Alanine Aminotransferase (ALT/SGPT) 22    U/L  10 - 45 Final      \par  eGFR 85    mL/min/1.73m2  See_Comment Final The estimated glomerular filtration rate (eGFR) is calculated using the 2021 CKD-EPI creatinine equation, which does not have a coefficient for race and is validated in individuals 18 years of age and older (N Engl J Med 2021; 385:5008-9150). Creatinine-based eGFR may be inaccurate in various situations including but not limited to extremes of muscle mass, altered dietary protein intake, or medications that affect renal tubular creatinine secretion. [Automated message] The system which generated this result transmitted reference range: >=60. The reference range was not used to interpret this result as normal/abnormal.   \par \par  \par \par  \par \par  C-Reactive Protein, Serum 35  High mg/L  See_Comment Final  [Automated message] The system which generated this result transmitted reference range: <=4. The reference range was not used to interpret this result as normal/abnormal.  \par \par \par  \par \par \par  Sedimentation Rate, Erythrocyte 23  High mm/hr  0 - 20 Final \par

## 2023-05-19 LAB
ALBUMIN SERPL ELPH-MCNC: 4.4 G/DL
ALP BLD-CCNC: 93 U/L
ALT SERPL-CCNC: 18 U/L
ANION GAP SERPL CALC-SCNC: 14 MMOL/L
AST SERPL-CCNC: 19 U/L
BILIRUB SERPL-MCNC: 0.7 MG/DL
BUN SERPL-MCNC: 16 MG/DL
CALCIUM SERPL-MCNC: 10.1 MG/DL
CHLORIDE SERPL-SCNC: 102 MMOL/L
CO2 SERPL-SCNC: 26 MMOL/L
CREAT SERPL-MCNC: 1.11 MG/DL
CRP SERPL-MCNC: 11 MG/L
EGFR: 72 ML/MIN/1.73M2
ERYTHROCYTE [SEDIMENTATION RATE] IN BLOOD BY WESTERGREN METHOD: 9 MM/HR
GLUCOSE SERPL-MCNC: 164 MG/DL
POTASSIUM SERPL-SCNC: 4.7 MMOL/L
PROT SERPL-MCNC: 7.9 G/DL
SODIUM SERPL-SCNC: 142 MMOL/L

## 2023-05-22 ENCOUNTER — APPOINTMENT (OUTPATIENT)
Dept: CARDIOTHORACIC SURGERY | Facility: CLINIC | Age: 68
End: 2023-05-22
Payer: MEDICARE

## 2023-05-22 VITALS
SYSTOLIC BLOOD PRESSURE: 116 MMHG | HEART RATE: 98 BPM | HEIGHT: 66 IN | WEIGHT: 167 LBS | BODY MASS INDEX: 26.84 KG/M2 | OXYGEN SATURATION: 94 % | DIASTOLIC BLOOD PRESSURE: 70 MMHG

## 2023-05-22 DIAGNOSIS — Z87.898 PERSONAL HISTORY OF OTHER SPECIFIED CONDITIONS: ICD-10-CM

## 2023-05-22 PROCEDURE — 99214 OFFICE O/P EST MOD 30 MIN: CPT

## 2023-05-24 PROBLEM — Z87.898 H/O SHORTNESS OF BREATH: Status: ACTIVE | Noted: 2021-03-03

## 2023-05-24 NOTE — HISTORY OF PRESENT ILLNESS
[FreeTextEntry1] : Mr. Palmer is a 65 year old male referred by Dr. Desouza for evaluation of Severe Mitral Regurgitation. He has a a past medical history significant for Hypertension, Hyperlipidemia, Diabetes Mellitus, HCV s/p treatment and Cardiac Murmur. He has known Hepatitis C and Cirrhosis and is seen by Dr. Nelson. \par \par He returns to the office today for follow up care from his visit in April. He has been evaluated by Dr Desouza recently and the option for MitraClip was discussed again. He has a recent echocardiogram which demonstrated moderate mitral regurgitation.\par \par He was admitted with syncope and low grade fever and was found to have endocarditis. He just completed 4 weeks of antibiotics which ended April 12. He now feels well but has not resumed exercise. \par \par patient will need repeat zoila to see if the valve is still repairable. at this time he does not report SOB but has not increased his activities.

## 2023-05-24 NOTE — DATA REVIEWED
[FreeTextEntry1] : Transesophageal Echocardiogram from 04/17/23 at [facility] \par - LVEF 50-55%\par - The NCC of the aortic valve is moderately calcified and non mobile\par - Posterior mitral valve is flail\par - Ruptured Chordae tendineae to the anterior mitral leaflet\par - Moderate to severe eccentric mitral regurgitation\par - Unable to rule out vegetation of mitral valve leaflets due to structural nature of valve\par \par \par \par \par \par \par \par Transthoracic Echocardiogram from 9/13/21 at [facility]\par - LVEF 45-50%\par - Bileaflet mitral valve prolapse. Moderate eccentric posterior mitral regurgitation\par - Thickened aortic valve with calcified NCC. No regurgitation\par \par \par Transesophageal Echocardiogram [facility] on 3/26/21\par - LVEF 60-65%\par - Degenerative posterior mitral leaflet with prolapsed and partially flailed P2 segment, there is calcific lesion immediately below the posterior leaflet. Severe eccentric mitral regurgitation noted. Jet is anteriorly directed with Coanda effect, systolic pulmonary veins reversal noted as well. Mean mitral valve gradient of 2.3 mmHg\par - Sclerotic aortic valve with normal opening, CHAS 2.59 cm2\par - Small pericardial effusion adjacent to the RV.

## 2023-05-24 NOTE — ASSESSMENT
[FreeTextEntry1] : patient will need repeat QUINCY to see if the valve is clippable post endocarditis. \par \par follow up GI, patient with Hep C with mass and biopsy 2-3 years ago. Will need to see GI for follow up mass in the liver.\par \par patient to follow up in my office after the QUINCY. and after GI follow up. \par \par \par \par

## 2023-05-25 LAB — BACTERIA BLD CULT: NORMAL

## 2023-06-10 RX ORDER — CHLORHEXIDINE GLUCONATE 213 G/1000ML
1 SOLUTION TOPICAL ONCE
Refills: 0 | Status: DISCONTINUED | OUTPATIENT
Start: 2023-06-12 | End: 2023-06-14

## 2023-06-10 NOTE — H&P PST ADULT - ASSESSMENT
ASSESSMENT: 69 yo male with mod to sev MR, post endocarditis followed by Dr. Islas for mitraClip (previously not open heart candidate), here for QUINCY.    -QUINCY as ordered  -Labs and ECG reviewed  -Procedure discussed with patient; risks and benefits explained; questions answered  -Consent obtained by Echocardiographer and anesthesiologist  -ASA and Mallampatti as per Anesthesia Assessment:     1.       2.       Discharge Plannning:   ·	Discharge: Later today  ·	Follow up with: Dr. Islas Assessment: 69 yo male with h/o CAD s/p PCI of RCA 2/16/2021, HTN, HLD, DM, HCV s/p treatment and cardiac murmur with MR. He saw Dr. Desouza for evaluation of moderate to severe MR and was referred to Dr. Islas for mitraClip evaluation. In April he had a syncopal episode and went to [facility] where he was admitted with syncope and endocarditis and completed 4 weeks of antibiotics as of April 12. He is being referred for QUINCY to evaluated mitral valve post endocarditis and if he is still a candidate for MitralClip. He does not report any SOB at this time but has not increased activity. + GUERIN and fatigue.    1. Severe MR, S/P bacterial mitral valve endocartitis  QUINCY to assess severity of MR    Discharge Planning   ·	Discharge: Later today  ·	Follow up with: Dr. Islas

## 2023-06-10 NOTE — H&P PST ADULT - HISTORY OF PRESENT ILLNESS
Department of Cardiology                                                                  [facility] [address] [place] [address]                                                            Telephone: [phone]. Fax:[phone]                                                                             Pre- Procedure H + P/Progress Note      HPI:  69 yo male with h/o CAD s/p PCI of RCA 2/16/21, HTN, HLD, DM, HCV s/p treatment and cardiac murmur with MR. He saw Dr. Desouza for evaluation of mod to sev MR and was referred to Dr. Islas for mitraClip evaluation. He was admitted with syncope and endocarditis and completed 4 weeks of antibiotics as of April 12. He is being referred for QUINCY to evaluated mitral valve post endocarditis and if he is still a candidate for MitralClip. He does not report any SOB at this time but has not increased activity      Symptoms:        Angina (Class):        Ischemic Symptoms:     Heart Failure:        Systolic/Diastolic/Combined:        NYHA Class (within 2 weeks):     Assessment of LVEF:       EF: 50-55%       Assessed by: TTE       Date: 4/1723    Prior Cardiac Interventions:  Cardiac Cath 2/16/21 [place]  LM Normal  LAD mid 50% negative IVUS  LCx Normal  RCA p 30%, mid 80%  s/p SONG mRCA (Stuart 3.5 x 26)         Noninvasive Testing:   Stress Test: Date:        Protocol:        Duration of Exercise:        Symptoms:        EKG Changes:        DTS:        Myocardial Imaging:        Risk Assessment (Low, Medium, High):     Echo: 4/17/23 [place]  LVEF 50-55%  Ruptured chordae anterior mitral leaflet  Mod to sev MR          Associated Risk Factors:        Cerebrovascular Disease: N/A       Chronic Lung Disease: N/A       Peripheral Arterial Disease: N/A       Chronic Kidney Disease (if yes, what is GFR): N/A       Uncontrolled Diabetes (if yes, what is HgbA1C or FBS): N/A       Poorly Controlled Hypertension (if yes, what is SBP): N/A       Morbid Obesity (if yes, what is BMI): N/A       History of Recent Ventricular Arrhythmia: N/A       Inability to Ambulate Safely: N/A       Need for Therapeutic Anticoagulation: N/A       Antiplatelet or Contrast Allergy: N/A       Fraility: Mild/Moderate/Severe    Antianginal Therapies:        Beta Blockers:  Toprol  mg       Calcium Channel Blockers: Amlodipine 10 mg       Long Acting Nitrates:        Ranexa:     	  MEDICATIONS:                    ROS: as stated above, otherwise negative    PHYSICAL EXAM:      T(C): --  HR: --  BP: --  RR: --  SpO2: --  Wt(kg): --      I&O's Summary      Daily     Daily     TELEMETRY: 	      ECG:  	    LABS:	 	                        Tnl:    Lipid Profile:   TC  TG  LDL  HDL    HgA1c:     proBNP:     TSH:                                                                                               67 yo male with h/o CAD s/p PCI of RCA 2/16/2021, HTN, HLD, DM, HCV s/p treatment and cardiac murmur with MR. He saw Dr. Desouza for evaluation of mod to sev MR and was referred to Dr. Islas for mitraClip evaluation. He was admitted with syncope and endocarditis and completed 4 weeks of antibiotics as of April 12. He is being referred for QUINCY to evaluated mitral valve post endocarditis and if he is still a candidate for MitralClip. He does not report any SOB at this time but has not increased activity    QUINCY: 4/17/2023  Left Ventricle: The left ventricular systolic function is normal with an ejection fraction visually estimated at 50 to 55%.  Left Atrium: There is no evidence of left atrial appendage thrombus.  Interatrial Septum: The interatrial septum appears intact.  Aortic Valve: The aortic valve appears trileaflet with normal systolic excursion. There is no evidence of aortic regurgitation.  Mitral Valve: There is a flail posterior mitral leaflet. There is no mitral valve stenosis. There is moderate to severe mitral regurgitation. There are ruptured chordae tendineae to the anterior mitral leaflet.  Tricuspid Valve: There is no evidence of tricuspid stenosis. There is trace tricuspid regurgitation.  Pulmonic Valve: There is no pulmonic valve stenosis. There is no evidence of pulmonic regurgitation.    Social History:        Marital:        Tobacco:        ETOH:        Caffeine:     ROS:   General: No fevers/chills, no fatigue  HEENT: No visual disturbances, no hearing loss, no headaches, no epistaxis.  CV: No chest pain, no GUERIN, no palpitations, no edema, no orthopnea, no PND.  Respiratory: No dyspnea, no wheeze, no cough.  GI: no nausea/vomiting, no black/bloody stools.  : No hematuria  Musculoskeletal: No myalgias, no arthralgias, jairon back pain.  Neurologic: No weakness, no hemiparesis, no paresthesias, no seizures, no syncope/near syncope.    T(C): 36.7 (06-12-23 @ 09:23), Max: 36.7 (06-12-23 @ 09:23)  HR: 79 (06-12-23 @ 09:23) (79 - 79)  BP: 156/83 (06-12-23 @ 09:23) (156/83 - 156/83)  RR: 16 (06-12-23 @ 09:23) (16 - 16)  SpO2: 97% (06-12-23 @ 09:23) (97% - 97%)    Physical Examination:   General: Awake, alert, speech clear, no acute distress.  HEENT: PERRL, EOMI.  Neck: No elevated JVP, no bruit, trachea midline.  Chest: CTA B/L, S1, S2, no murmur, RRR.  Abdomen: Soft, nontender, nondistended, normal bowel sounds.  Extremities: No edema, 2+ pulses X 4 extremities.  Neurologic: A&OX3, CN 2-12 grossly intact. 67 yo male with h/o CAD s/p PCI of RCA 2/16/2021, HTN, HLD, DM, HCV s/p treatment and cardiac murmur with MR. He saw Dr. Desouza for evaluation of moderate to severe MR and was referred to Dr. Islas for mitraClip evaluation. In April he had a syncopal episode and went to [facility] where he was admitted with syncope and endocarditis and completed 4 weeks of antibiotics as of April 12. He is being referred for QUINCY to evaluated mitral valve post endocarditis and if he is still a candidate for MitralClip. He does not report any SOB at this time but has not increased activity. + GUERIN and fatigue.    QUINCY: 4/17/2023  Left Ventricle: The left ventricular systolic function is normal with an ejection fraction visually estimated at 50 to 55%.  Left Atrium: There is no evidence of left atrial appendage thrombus.  Interatrial Septum: The interatrial septum appears intact.  Aortic Valve: The aortic valve appears trileaflet with normal systolic excursion. There is no evidence of aortic regurgitation.  Mitral Valve: There is a flail posterior mitral leaflet. There is no mitral valve stenosis. There is moderate to severe mitral regurgitation. There are ruptured chordae tendineae to the anterior mitral leaflet.  Tricuspid Valve: There is no evidence of tricuspid stenosis. There is trace tricuspid regurgitation.  Pulmonic Valve: There is no pulmonic valve stenosis. There is no evidence of pulmonic regurgitation.    Social History:        Marital: , lives with wife       Tobacco: Former 0.5 ppd smoker for 10 years, quit 30 years ago.       ETOH: Denies       Drugs: Denies       Caffeine: 1 cup of coffee daily    ROS:   General: No fevers/chills, + fatigue  HEENT: No visual disturbances, no hearing loss, no headaches, no epistaxis.  CV: No chest pain, + GUERIN, no palpitations, no edema, no orthopnea, no PND.  Respiratory: No dyspnea, no wheeze, no cough.  GI: no nausea/vomiting, no black/bloody stools.  : No hematuria  Musculoskeletal: No myalgias, no arthralgias, jairon back pain.  Neurologic: No weakness, no hemiparesis, no paresthesias, no seizures, no syncope/near syncope.    T(C): 36.7 (06-12-23 @ 09:23), Max: 36.7 (06-12-23 @ 09:23)  HR: 79 (06-12-23 @ 09:23) (79 - 79)  BP: 156/83 (06-12-23 @ 09:23) (156/83 - 156/83)  RR: 16 (06-12-23 @ 09:23) (16 - 16)  SpO2: 97% (06-12-23 @ 09:23) (97% - 97%)    Physical Examination:   General: Awake, alert, speech clear, no acute distress.  HEENT: PERRL, EOMI.  Neck: No elevated JVP, no bruit, trachea midline.  Chest: CTA B/L, S1, S2, + grade 3/6 systolic murmur, RRR.  Abdomen: Soft, nontender, nondistended, normal bowel sounds.  Extremities: No edema, 2+ pulses X 4 extremities.  Neurologic: A&OX3, CN 2-12 grossly intact.

## 2023-06-10 NOTE — H&P PST ADULT - OTHER CARE PROVIDERS
Dr. Islas/Lyly Adam Desouza ([facility] [place], [address] [place] [address], [phone], Fax: [phone]), Wellington Islas ([facility], [address] [place] [id number], Tel: [phone])

## 2023-06-10 NOTE — H&P PST ADULT - PRIMARY CARE PROVIDER
Dr. Samuels Hortensia Samuels ([address] [place] [address], Phone: [phone], Fax: [phone])

## 2023-06-12 ENCOUNTER — INPATIENT (INPATIENT)
Facility: HOSPITAL | Age: 68
LOS: 8 days | Discharge: HOME | DRG: 217 | End: 2023-06-21
Attending: THORACIC SURGERY (CARDIOTHORACIC VASCULAR SURGERY) | Admitting: THORACIC SURGERY (CARDIOTHORACIC VASCULAR SURGERY)
Payer: COMMERCIAL

## 2023-06-12 ENCOUNTER — TRANSCRIPTION ENCOUNTER (OUTPATIENT)
Age: 68
End: 2023-06-12

## 2023-06-12 VITALS
SYSTOLIC BLOOD PRESSURE: 156 MMHG | RESPIRATION RATE: 16 BRPM | OXYGEN SATURATION: 97 % | TEMPERATURE: 98 F | DIASTOLIC BLOOD PRESSURE: 83 MMHG | HEART RATE: 79 BPM

## 2023-06-12 DIAGNOSIS — I34.0 NONRHEUMATIC MITRAL (VALVE) INSUFFICIENCY: ICD-10-CM

## 2023-06-12 DIAGNOSIS — Z98.890 OTHER SPECIFIED POSTPROCEDURAL STATES: Chronic | ICD-10-CM

## 2023-06-12 DIAGNOSIS — Z98.89 OTHER SPECIFIED POSTPROCEDURAL STATES: Chronic | ICD-10-CM

## 2023-06-12 DIAGNOSIS — Z98.1 ARTHRODESIS STATUS: Chronic | ICD-10-CM

## 2023-06-12 DIAGNOSIS — Z95.5 PRESENCE OF CORONARY ANGIOPLASTY IMPLANT AND GRAFT: Chronic | ICD-10-CM

## 2023-06-12 LAB
A1C WITH ESTIMATED AVERAGE GLUCOSE RESULT: 6.3 % — HIGH (ref 4–5.6)
ANION GAP SERPL CALC-SCNC: 13 MMOL/L — SIGNIFICANT CHANGE UP (ref 5–17)
APTT BLD: 31.5 SEC — SIGNIFICANT CHANGE UP (ref 27.5–35.5)
BASOPHILS # BLD AUTO: 0.03 K/UL — SIGNIFICANT CHANGE UP (ref 0–0.2)
BASOPHILS NFR BLD AUTO: 0.5 % — SIGNIFICANT CHANGE UP (ref 0–2)
BLD GP AB SCN SERPL QL: SIGNIFICANT CHANGE UP
BUN SERPL-MCNC: 14.9 MG/DL — SIGNIFICANT CHANGE UP (ref 8–20)
CALCIUM SERPL-MCNC: 9.6 MG/DL — SIGNIFICANT CHANGE UP (ref 8.4–10.5)
CHLORIDE SERPL-SCNC: 106 MMOL/L — SIGNIFICANT CHANGE UP (ref 96–108)
CO2 SERPL-SCNC: 23 MMOL/L — SIGNIFICANT CHANGE UP (ref 22–29)
CREAT SERPL-MCNC: 0.8 MG/DL — SIGNIFICANT CHANGE UP (ref 0.5–1.3)
EGFR: 96 ML/MIN/1.73M2 — SIGNIFICANT CHANGE UP
EOSINOPHIL # BLD AUTO: 0.27 K/UL — SIGNIFICANT CHANGE UP (ref 0–0.5)
EOSINOPHIL NFR BLD AUTO: 4.5 % — SIGNIFICANT CHANGE UP (ref 0–6)
ESTIMATED AVERAGE GLUCOSE: 134 MG/DL — HIGH (ref 68–114)
GLUCOSE BLDC GLUCOMTR-MCNC: 104 MG/DL — HIGH (ref 70–99)
GLUCOSE BLDC GLUCOMTR-MCNC: 151 MG/DL — HIGH (ref 70–99)
GLUCOSE SERPL-MCNC: 165 MG/DL — HIGH (ref 70–99)
HCT VFR BLD CALC: 38.7 % — LOW (ref 39–50)
HGB BLD-MCNC: 13.2 G/DL — SIGNIFICANT CHANGE UP (ref 13–17)
IMM GRANULOCYTES NFR BLD AUTO: 0.3 % — SIGNIFICANT CHANGE UP (ref 0–0.9)
INR BLD: 1.23 RATIO — HIGH (ref 0.88–1.16)
LYMPHOCYTES # BLD AUTO: 1.74 K/UL — SIGNIFICANT CHANGE UP (ref 1–3.3)
LYMPHOCYTES # BLD AUTO: 28.8 % — SIGNIFICANT CHANGE UP (ref 13–44)
MCHC RBC-ENTMCNC: 29.8 PG — SIGNIFICANT CHANGE UP (ref 27–34)
MCHC RBC-ENTMCNC: 34.1 GM/DL — SIGNIFICANT CHANGE UP (ref 32–36)
MCV RBC AUTO: 87.4 FL — SIGNIFICANT CHANGE UP (ref 80–100)
MONOCYTES # BLD AUTO: 0.55 K/UL — SIGNIFICANT CHANGE UP (ref 0–0.9)
MONOCYTES NFR BLD AUTO: 9.1 % — SIGNIFICANT CHANGE UP (ref 2–14)
MRSA PCR RESULT.: SIGNIFICANT CHANGE UP
NEUTROPHILS # BLD AUTO: 3.44 K/UL — SIGNIFICANT CHANGE UP (ref 1.8–7.4)
NEUTROPHILS NFR BLD AUTO: 56.8 % — SIGNIFICANT CHANGE UP (ref 43–77)
NT-PROBNP SERPL-SCNC: 118 PG/ML — SIGNIFICANT CHANGE UP (ref 0–300)
PA ADP PRP-ACNC: 245 PRU — SIGNIFICANT CHANGE UP (ref 180–376)
PLATELET # BLD AUTO: 284 K/UL — SIGNIFICANT CHANGE UP (ref 150–400)
POTASSIUM SERPL-MCNC: 4.7 MMOL/L — SIGNIFICANT CHANGE UP (ref 3.5–5.3)
POTASSIUM SERPL-SCNC: 4.7 MMOL/L — SIGNIFICANT CHANGE UP (ref 3.5–5.3)
PREALB SERPL-MCNC: 19 MG/DL — SIGNIFICANT CHANGE UP (ref 18–38)
PROTHROM AB SERPL-ACNC: 14.3 SEC — HIGH (ref 10.5–13.4)
RBC # BLD: 4.43 M/UL — SIGNIFICANT CHANGE UP (ref 4.2–5.8)
RBC # FLD: 14.2 % — SIGNIFICANT CHANGE UP (ref 10.3–14.5)
S AUREUS DNA NOSE QL NAA+PROBE: SIGNIFICANT CHANGE UP
SODIUM SERPL-SCNC: 142 MMOL/L — SIGNIFICANT CHANGE UP (ref 135–145)
TSH SERPL-MCNC: 1.71 UIU/ML — SIGNIFICANT CHANGE UP (ref 0.27–4.2)
WBC # BLD: 6.05 K/UL — SIGNIFICANT CHANGE UP (ref 3.8–10.5)
WBC # FLD AUTO: 6.05 K/UL — SIGNIFICANT CHANGE UP (ref 3.8–10.5)

## 2023-06-12 PROCEDURE — 93880 EXTRACRANIAL BILAT STUDY: CPT | Mod: 26

## 2023-06-12 RX ORDER — HYDROCHLOROTHIAZIDE 25 MG
1 TABLET ORAL
Qty: 0 | Refills: 0 | DISCHARGE

## 2023-06-12 RX ORDER — DEXTROSE 50 % IN WATER 50 %
25 SYRINGE (ML) INTRAVENOUS ONCE
Refills: 0 | Status: DISCONTINUED | OUTPATIENT
Start: 2023-06-12 | End: 2023-06-14

## 2023-06-12 RX ORDER — CHLORHEXIDINE GLUCONATE 213 G/1000ML
15 SOLUTION TOPICAL
Refills: 0 | Status: DISCONTINUED | OUTPATIENT
Start: 2023-06-12 | End: 2023-06-14

## 2023-06-12 RX ORDER — CLOPIDOGREL BISULFATE 75 MG/1
0 TABLET, FILM COATED ORAL
Qty: 0 | Refills: 0 | DISCHARGE

## 2023-06-12 RX ORDER — SODIUM CHLORIDE 9 MG/ML
1000 INJECTION, SOLUTION INTRAVENOUS
Refills: 0 | Status: DISCONTINUED | OUTPATIENT
Start: 2023-06-12 | End: 2023-06-14

## 2023-06-12 RX ORDER — VANCOMYCIN HCL 1 G
1000 VIAL (EA) INTRAVENOUS ONCE
Refills: 0 | Status: DISCONTINUED | OUTPATIENT
Start: 2023-06-14 | End: 2023-06-14

## 2023-06-12 RX ORDER — DEXTROSE 50 % IN WATER 50 %
12.5 SYRINGE (ML) INTRAVENOUS ONCE
Refills: 0 | Status: DISCONTINUED | OUTPATIENT
Start: 2023-06-12 | End: 2023-06-14

## 2023-06-12 RX ORDER — GLUCAGON INJECTION, SOLUTION 0.5 MG/.1ML
1 INJECTION, SOLUTION SUBCUTANEOUS ONCE
Refills: 0 | Status: DISCONTINUED | OUTPATIENT
Start: 2023-06-12 | End: 2023-06-14

## 2023-06-12 RX ORDER — CEFUROXIME AXETIL 250 MG
1500 TABLET ORAL ONCE
Refills: 0 | Status: DISCONTINUED | OUTPATIENT
Start: 2023-06-14 | End: 2023-06-14

## 2023-06-12 RX ORDER — METOPROLOL TARTRATE 50 MG
1 TABLET ORAL
Qty: 0 | Refills: 0 | DISCHARGE

## 2023-06-12 RX ORDER — DEXTROSE 50 % IN WATER 50 %
15 SYRINGE (ML) INTRAVENOUS ONCE
Refills: 0 | Status: DISCONTINUED | OUTPATIENT
Start: 2023-06-12 | End: 2023-06-14

## 2023-06-12 RX ORDER — SODIUM CHLORIDE 9 MG/ML
3 INJECTION INTRAMUSCULAR; INTRAVENOUS; SUBCUTANEOUS EVERY 8 HOURS
Refills: 0 | Status: DISCONTINUED | OUTPATIENT
Start: 2023-06-12 | End: 2023-06-14

## 2023-06-12 RX ORDER — INSULIN LISPRO 100/ML
VIAL (ML) SUBCUTANEOUS
Refills: 0 | Status: DISCONTINUED | OUTPATIENT
Start: 2023-06-12 | End: 2023-06-12

## 2023-06-12 RX ORDER — METHIMAZOLE 10 MG/1
0 TABLET ORAL
Qty: 0 | Refills: 0 | DISCHARGE

## 2023-06-12 RX ORDER — CHLORHEXIDINE GLUCONATE 213 G/1000ML
1 SOLUTION TOPICAL
Refills: 0 | Status: DISCONTINUED | OUTPATIENT
Start: 2023-06-12 | End: 2023-06-14

## 2023-06-12 RX ORDER — INSULIN LISPRO 100/ML
VIAL (ML) SUBCUTANEOUS
Refills: 0 | Status: DISCONTINUED | OUTPATIENT
Start: 2023-06-12 | End: 2023-06-14

## 2023-06-12 RX ORDER — METOPROLOL TARTRATE 50 MG
100 TABLET ORAL DAILY
Refills: 0 | Status: DISCONTINUED | OUTPATIENT
Start: 2023-06-12 | End: 2023-06-14

## 2023-06-12 RX ORDER — MUPIROCIN 20 MG/G
1 OINTMENT TOPICAL EVERY 12 HOURS
Refills: 0 | Status: DISCONTINUED | OUTPATIENT
Start: 2023-06-12 | End: 2023-06-14

## 2023-06-12 RX ORDER — PANTOPRAZOLE SODIUM 20 MG/1
40 TABLET, DELAYED RELEASE ORAL
Refills: 0 | Status: DISCONTINUED | OUTPATIENT
Start: 2023-06-12 | End: 2023-06-14

## 2023-06-12 RX ORDER — AMLODIPINE BESYLATE 2.5 MG/1
10 TABLET ORAL DAILY
Refills: 0 | Status: DISCONTINUED | OUTPATIENT
Start: 2023-06-12 | End: 2023-06-14

## 2023-06-12 RX ORDER — ATORVASTATIN CALCIUM 80 MG/1
80 TABLET, FILM COATED ORAL AT BEDTIME
Refills: 0 | Status: DISCONTINUED | OUTPATIENT
Start: 2023-06-12 | End: 2023-06-14

## 2023-06-12 RX ORDER — OXYCODONE AND ACETAMINOPHEN 5; 325 MG/1; MG/1
1 TABLET ORAL EVERY 4 HOURS
Refills: 0 | Status: DISCONTINUED | OUTPATIENT
Start: 2023-06-12 | End: 2023-06-14

## 2023-06-12 RX ADMIN — PANTOPRAZOLE SODIUM 40 MILLIGRAM(S): 20 TABLET, DELAYED RELEASE ORAL at 18:49

## 2023-06-12 RX ADMIN — Medication 100 MILLIGRAM(S): at 18:49

## 2023-06-12 RX ADMIN — ATORVASTATIN CALCIUM 80 MILLIGRAM(S): 80 TABLET, FILM COATED ORAL at 21:05

## 2023-06-12 RX ADMIN — MUPIROCIN 1 APPLICATION(S): 20 OINTMENT TOPICAL at 18:50

## 2023-06-12 RX ADMIN — CHLORHEXIDINE GLUCONATE 15 MILLILITER(S): 213 SOLUTION TOPICAL at 20:56

## 2023-06-12 NOTE — CONSULT NOTE ADULT - SUBJECTIVE AND OBJECTIVE BOX
Surgeon: Roshan   Consult requesting by: Nandini  outpt cards: Persits     HISTORY OF PRESENT ILLNESS:  68M, pmhx CAD s/p PCI of RCA 2/16/2021, HTN, HLD, DM, hemochromatosis, neck surgery s/p PCDF & ACDF, appendectomy, hernia repair, R shoulder dislocation s/p repair, HCV s/p known MR initially planned to be evaluated for mitral clip, recent admission in April for syncope secondary to orthostatic hypotension and BELLO in setting of dehydration, found to have strep salivarius/vestilbularis bacteremia, QUINCY with severe MR, inconclusive for endocarditis, pt tx with 4 weeks ceftriaxone, now s/p repeat QUINCY to assess valve, found to have severe MR with multiple mobile echodensities.      PAST MEDICAL & SURGICAL HISTORY:  Lung nodule  Severe mitral regurgitation  Aortic stenosis  Chronic hepatitis C virus infection  Coronary artery disease of native artery of native heart with stable angina pectoris  Type 2 diabetes mellitus with other circulatory complication, with long-term current use of insulin  Primary hypertension  Mixed hyperlipidemia  Hemochromatosis  H/O neck surgery  PCDF 1992,  ACDF 1993  S/P appendectomy 1974,  right shoulder dislocation 1962  H/O hernia repair 1985  S/P spinal fusion  S/P shoulder surgery  Status post insertion of drug-eluting stent into right coronary artery for coronary artery disease  History of ERCP    MEDICATIONS  (STANDING):  chlorhexidine 0.12% Liquid 15 milliLiter(s) Swish and Spit two times a day  chlorhexidine 4% Liquid 1 Application(s) Topical Once  chlorhexidine 4% Liquid 1 Application(s) Topical two times a day  mupirocin 2% Ointment 1 Application(s) Both Nostrils every 12 hours  sodium chloride 0.9% lock flush 3 milliLiter(s) IV Push every 8 hours    Allergies: No Known Allergies    SOCIAL HISTORY:    FAMILY HISTORY:  Family history of diabetes mellitus    Review of Systems: negative x10 systems except as noted above    PHYSICAL EXAM  Vital Signs Last 24 Hrs  T(C): 36.7 (12 Jun 2023 09:23), Max: 36.7 (12 Jun 2023 09:23)  T(F): 98 (12 Jun 2023 09:23), Max: 98 (12 Jun 2023 09:23)  HR: 74 (12 Jun 2023 12:30) (74 - 83)  BP: 155/74 (12 Jun 2023 12:30) (109/59 - 156/83)  RR: 16 (12 Jun 2023 12:30) (16 - 16)  SpO2: 95% (12 Jun 2023 12:30) (95% - 97%)    Parameters below as of 12 Jun 2023 12:30  Patient On (Oxygen Delivery Method): room air    PE:  Gen:  Neuro:  HEENT:  Neck:  Pulm:  CV:  Abd:  Ext:  vascular:  skin:  psych:                                                           LABS:                        13.2   6.05  )-----------( 284      ( 12 Jun 2023 08:40 )             38.7     06-12    142  |  106  |  14.9  ----------------------------<  165<H>  4.7   |  23.0  |  0.80    Ca    9.6      12 Jun 2023 08:40                           Surgeon: Roshan   Consult requesting by: Nandini  outpt cards: Persits     HISTORY OF PRESENT ILLNESS:  68M, pmhx CAD s/p PCI of RCA 2/16/2021, HTN, HLD, DM, hyperthyroidism, hemochromatosis, neck surgery s/p PCDF & ACDF, appendectomy, hernia repair, R shoulder dislocation s/p repair, HCV s/p known MR initially planned to be evaluated for mitral clip, recent admission in April for syncope secondary to orthostatic hypotension and BELLO in setting of dehydration, found to have strep salivarius/vestilbularis bacteremia, QUINCY with severe MR, inconclusive for endocarditis, pt tx with 4 weeks ceftriaxone, now s/p repeat QUINCY to assess valve, found to have severe MR with multiple mobile echodensities.      PAST MEDICAL & SURGICAL HISTORY:  Lung nodule  Severe mitral regurgitation  Aortic stenosis  Chronic hepatitis C virus infection  Coronary artery disease of native artery of native heart with stable angina pectoris  Type 2 diabetes mellitus with other circulatory complication, with long-term current use of insulin  Primary hypertension  Mixed hyperlipidemia  Hemochromatosis  H/O neck surgery  PCDF 1992,  ACDF 1993  S/P appendectomy 1974,  right shoulder dislocation 1962  H/O hernia repair 1985  S/P spinal fusion  S/P shoulder surgery  Status post insertion of drug-eluting stent into right coronary artery for coronary artery disease  History of ERCP    MEDICATIONS  (STANDING):  chlorhexidine 0.12% Liquid 15 milliLiter(s) Swish and Spit two times a day  chlorhexidine 4% Liquid 1 Application(s) Topical Once  chlorhexidine 4% Liquid 1 Application(s) Topical two times a day  mupirocin 2% Ointment 1 Application(s) Both Nostrils every 12 hours  sodium chloride 0.9% lock flush 3 milliLiter(s) IV Push every 8 hours    Allergies: No Known Allergies    SOCIAL HISTORY:    FAMILY HISTORY:  Family history of diabetes mellitus    Review of Systems: negative x10 systems except as noted above    PHYSICAL EXAM  Vital Signs Last 24 Hrs  T(C): 36.7 (12 Jun 2023 09:23), Max: 36.7 (12 Jun 2023 09:23)  T(F): 98 (12 Jun 2023 09:23), Max: 98 (12 Jun 2023 09:23)  HR: 74 (12 Jun 2023 12:30) (74 - 83)  BP: 155/74 (12 Jun 2023 12:30) (109/59 - 156/83)  RR: 16 (12 Jun 2023 12:30) (16 - 16)  SpO2: 95% (12 Jun 2023 12:30) (95% - 97%)    Parameters below as of 12 Jun 2023 12:30  Patient On (Oxygen Delivery Method): room air    PE:  Gen:  Neuro:  HEENT:  Neck:  Pulm:  CV:  Abd:  Ext:  vascular:  skin:  psych:                                                           LABS:                        13.2   6.05  )-----------( 284      ( 12 Jun 2023 08:40 )             38.7     06-12    142  |  106  |  14.9  ----------------------------<  165<H>  4.7   |  23.0  |  0.80    Ca    9.6      12 Jun 2023 08:40                           Surgeon: Roshan   Consult requesting by: Nandini  outpt cards: Lyly     HISTORY OF PRESENT ILLNESS:  68M, pmhx CAD s/p PCI of RCA 2/16/2021, HTN, HLD, DM, hyperthyroidism, hemochromatosis, neck surgery s/p PCDF & ACDF, appendectomy, hernia repair, R shoulder dislocation s/p repair, HCV s/p known MR initially planned to be evaluated for mitral clip, recent admission in April for syncope secondary to orthostatic hypotension and BELLO in setting of dehydration, found to have strep salivarius/vestilbularis bacteremia, QUINCY with severe MR, inconclusive for endocarditis, pt tx with 4 weeks ceftriaxone, now s/p repeat UQINCY to assess valve, found to have severe MR with multiple mobile echodensities.  pt sates he was originally told by his primary care doctor that he had a valve problem and was referred to Dr. Desouza for evaluation and was initially being considered for mitral clip. However, pt states he received a singles vaccine in April and he became very ill for several days including fever and general malaise. That was when he had an episode of syncope due to dehydration and was found to have the bacteremia. Pt states he also had a dental cleaning around the same time.  Pt reports he has never experienced CP/SOB/LE edema. He states he is extremely active and would have never known he had a problem if his doctor didnt tell him. Pt denies CP, palpitations, SOB, cough, fever, chills, itchiness/rash, diaphoresis, vision changes, HA, dizziness/lightheadedness, numbness/tingling, abd pain, N/V.    PAST MEDICAL & SURGICAL HISTORY:  Lung nodule  Severe mitral regurgitation  Aortic stenosis  Chronic hepatitis C virus infection  Coronary artery disease of native artery of native heart with stable angina pectoris  Type 2 diabetes mellitus with other circulatory complication, with long-term current use of insulin  Primary hypertension  Mixed hyperlipidemia  Hemochromatosis  H/O neck surgery  PCDF 1992,  ACDF 1993  S/P appendectomy 1974,  right shoulder dislocation 1962  H/O hernia repair 1985  S/P spinal fusion  S/P shoulder surgery  Status post insertion of drug-eluting stent into right coronary artery for coronary artery disease  History of ERCP    MEDICATIONS  (STANDING):  chlorhexidine 0.12% Liquid 15 milliLiter(s) Swish and Spit two times a day  chlorhexidine 4% Liquid 1 Application(s) Topical Once  chlorhexidine 4% Liquid 1 Application(s) Topical two times a day  mupirocin 2% Ointment 1 Application(s) Both Nostrils every 12 hours  sodium chloride 0.9% lock flush 3 milliLiter(s) IV Push every 8 hours    Allergies: No Known Allergies    SOCIAL HISTORY:  lives with wife and has adult child living with them  retired   independent  no assist devices  was using cane briefly after discharge from hospital in April, but was cleared by PT   former smoker, social ETOH, denies ilicit drugs    FAMILY HISTORY:  Family history of diabetes mellitus    Review of Systems: negative x10 systems except as noted above    PHYSICAL EXAM  Vital Signs Last 24 Hrs  T(C): 36.7 (12 Jun 2023 09:23), Max: 36.7 (12 Jun 2023 09:23)  T(F): 98 (12 Jun 2023 09:23), Max: 98 (12 Jun 2023 09:23)  HR: 74 (12 Jun 2023 12:30) (74 - 83)  BP: 155/74 (12 Jun 2023 12:30) (109/59 - 156/83)  RR: 16 (12 Jun 2023 12:30) (16 - 16)  SpO2: 95% (12 Jun 2023 12:30) (95% - 97%)    Parameters below as of 12 Jun 2023 12:30  Patient On (Oxygen Delivery Method): room air    PE:  Gen: NAD  Neuro: A&Ox3 non focal speech clear and itnact  HEENT: L pupil slightly irregular, reacts, R pupil RRL teeth: upper dentures, no broken/cracked teeth in lowers   Neck: supple no JVD no bruits,   Pulm: CTA b/l no wheezing  CV: S1S2 RRR + murmur  Abd: +BS soft NT ND  Ext: no edema/cyanosis, RICO  vascular: palpable DP/radial pulses b/l  skin: no rashes, WWP  psych: normal mood/affect                                                           LABS:                        13.2   6.05  )-----------( 284      ( 12 Jun 2023 08:40 )             38.7     06-12    142  |  106  |  14.9  ----------------------------<  165<H>  4.7   |  23.0  |  0.80    Ca    9.6      12 Jun 2023 08:40

## 2023-06-12 NOTE — DISCHARGE NOTE NURSING/CASE MANAGEMENT/SOCIAL WORK - NSDCFUADDAPPT_GEN_ALL_CORE_FT
[facility]: SCHEDULED TO RESUME FOR MONDAYS & THURSDAYS FOR INR HOME LAB DRAWS, RESULTS TO BE MANAGED BY YOUR PCP: DR. REJI TAMEZ [phone]. [facility]: SCHEDULED TO RESUME ON THURSDAY, 6/22/2023 FOR BIWEEKLY (MONDAY & THURSDAY) INR HOME LAB DRAWS, RESULTS TO BE MANAGED BY YOUR PCP: DR. REJI TAMEZ, PHONE: [phone], FAX: [phone]. [facility]: SCHEDULED TO RESUME ON THURSDAY, 6/22/2023 FOR BIWEEKLY (MONDAY & THURSDAY) PT/INR HOME LAB DRAWS, RESULTS TO BE MANAGED BY YOUR PCP: DR. REJI TAMEZ, PHONE: [phone], FAX: [phone].

## 2023-06-12 NOTE — PROGRESS NOTE ADULT - SUBJECTIVE AND OBJECTIVE BOX
Department of Cardiology                                                                  [facility]/[place] [address] [id number]                                                            Telephone: [phone]. Fax:[phone]                                                                                         QUINCY NOTE     68y Male S/P QUINCY which showed:   Left Ventricle: Left ventricular ejection fraction, by visual estimation, is 55 to 60%.  Right Ventricle: Normal right ventricular size and function.  Left Atrium: Mildly enlarged left atrium. No left atrial appendage thrombus is seen and the left atrial appendage is enlarged. Intact intra-atrial septum without shunt. Color flow doppler and intravenous injection of agitated saline demonstrates the presence of an intact intra atrial septum.  Right Atrium: The right atrium is normal in size.  Pericardium: Trivial pericardial effusion is present.  Mitral Valve: Severe mitral valve regurgitation is seen. There are multiple elongate mobile echo densities one on the tip of anterior leaflet measiuring 1.3 x 0.3 cm , and the other on posterior leaflet measuring 1.2 x 0.3 cm. Flail posterior leaflet. There is very severe eccentric mitral regurgitation. There non existent posterior leaflet.  Tricuspid Valve: Mild tricuspid regurgitation is visualized.  Aortic Valve: The aortic valve is trileaflet. Sclerotic aortic valve with normal opening. No evidence of aortic valve regurgitation is seen.   Sclerotic aortic valve. Artifact due to calcification.  Pulmonic Valve: Structurally normal pulmonic valve, with normal leaflet excursion. No indication of pulmonic valve regurgitation.  Aorta: The aortic root is normal in size and structure. Simple atheroma seen in the aortic arch and descending aorta.  Pulmonary Artery: The main pulmonary artery is normal in size. Normal pulmonary artery pressure.  Venous: A pattern of systolic flow reversal, suggestive of severe mitral regurgitation is recorded from the right upper pulmonary vein.  Shunts: Agitated saline contrast was given intravenously to evaluate for intracardiac shunting. There is no evidence of a patent foramen ovale.    PAST MEDICAL & SURGICAL HISTORY:  Lung nodule  Severe mitral regurgitation  Aortic stenosis  Chronic hepatitis C virus infection  Coronary artery disease of native artery of native heart with stable angina pectoris  Type 2 diabetes mellitus with other circulatory complication, with long-term current use of insulin  Primary hypertension  Mixed hyperlipidemia  Hemochromatosis  H/O neck surgery: PCDF 1992,  ACDF 1993  S/P appendectomy: 1974,, right shoulder dislocation 1962  H/O hernia repair: 1985  S/P spinal fusion  S/P shoulder surgery  Status post insertion of drug-eluting stent into right coronary artery for coronary artery disease  History of ERCP    Home Medications:  amlodipine-valsartan 10 mg-320 mg oral tablet: 1 tab(s) orally once a day (12 Jun 2023 09:16)  aspirin 81 mg oral delayed release tablet: 1 tab(s) orally once a day (12 Jun 2023 09:16)  ATORVASTATIN 80 MG TABLET:  (12 Jun 2023 09:16)  FREESTYLE BLAIRE 14 DAY SENSOR: APPLY AS DIRECTED EVERY 14 DAYS (12 Jun 2023 09:16)  HYDROCODONE-ACETAMIN  MG: TAKE 1 TABLET BY MOUTH EVERY 4 TO 6 HOURS AS NEEDED FOR PAIN (12 Jun 2023 09:16)  Insulin Lispro KwikPen 100 units/mL injectable solution: 72/25  12units  (12 Jun 2023 09:16)  methIMAzole 5 mg oral tablet: 1 orally once a day (12 Jun 2023 09:16)  Metoprolol Succinate  mg oral tablet, extended release: 1 tab(s) orally once a day (12 Jun 2023 09:16)  Trulicity Pen 0.75 mg/0.5 mL subcutaneous solution:  (12 Jun 2023 09:16)    Objective:   T(C): 36.7 (06-12-23 @ 09:23), Max: 36.7 (06-12-23 @ 09:23)  HR: 78 (06-12-23 @ 11:20) (78 - 83)  BP: 123/56 (06-12-23 @ 11:20) (109/59 - 156/83)  RR: 16 (06-12-23 @ 11:20) (16 - 16)  SpO2: 95% (06-12-23 @ 11:20) (95% - 97%)    CM: SR  General: Awake, alert, speech clear, no acute distress  Chest: S1, S2, RRR, CTA B/L  Neuro: A&OX3, CN II-XII grossly intact                          13.2   6.05  )-----------( 284      ( 12 Jun 2023 08:40 )             38.7     06-12    142  |  106  |  14.9  ----------------------------<  165  4.7   |  23.0  |  0.80    Ca    9.6      12 Jun 2023 08:40          Assessment/Plan:     Problem List:   1. S/P QUINCY  ·	Vital signs:   ·	     Every 15 minutes times 4 sets,  ·	     then every 30 minutes until Suzanne score returns to baseline,   ·	     then as per unit routine.  ·	Bedrest for 1 hour.    2. Severe MR  ·	Follow up with Dr. Islas    Discharge Planning:   ·	Follow up with: Roshan  ·	Discharge today.                                                                             Department of Cardiology                                                                  [facility]/[place] [address] [id number]                                                            Telephone: [phone]. Fax:[phone]                                                                                         QUINCY NOTE     68y Male S/P QUINCY which showed:   Left Ventricle: Left ventricular ejection fraction, by visual estimation, is 55 to 60%.  Right Ventricle: Normal right ventricular size and function.  Left Atrium: Mildly enlarged left atrium. No left atrial appendage thrombus is seen and the left atrial appendage is enlarged. Intact intra-atrial septum without shunt. Color flow doppler and intravenous injection of agitated saline demonstrates the presence of an intact intra atrial septum.  Right Atrium: The right atrium is normal in size.  Pericardium: Trivial pericardial effusion is present.  Mitral Valve: Severe mitral valve regurgitation is seen. There are multiple elongate mobile echo densities one on the tip of anterior leaflet measiuring 1.3 x 0.3 cm , and the other on posterior leaflet measuring 1.2 x 0.3 cm. Flail posterior leaflet. There is very severe eccentric mitral regurgitation. There non existent posterior leaflet.  Tricuspid Valve: Mild tricuspid regurgitation is visualized.  Aortic Valve: The aortic valve is trileaflet. Sclerotic aortic valve with normal opening. No evidence of aortic valve regurgitation is seen.   Sclerotic aortic valve. Artifact due to calcification.  Pulmonic Valve: Structurally normal pulmonic valve, with normal leaflet excursion. No indication of pulmonic valve regurgitation.  Aorta: The aortic root is normal in size and structure. Simple atheroma seen in the aortic arch and descending aorta.  Pulmonary Artery: The main pulmonary artery is normal in size. Normal pulmonary artery pressure.  Venous: A pattern of systolic flow reversal, suggestive of severe mitral regurgitation is recorded from the right upper pulmonary vein.  Shunts: Agitated saline contrast was given intravenously to evaluate for intracardiac shunting. There is no evidence of a patent foramen ovale.    PAST MEDICAL & SURGICAL HISTORY:  Lung nodule  Severe mitral regurgitation  Aortic stenosis  Chronic hepatitis C virus infection  Coronary artery disease of native artery of native heart with stable angina pectoris  Type 2 diabetes mellitus with other circulatory complication, with long-term current use of insulin  Primary hypertension  Mixed hyperlipidemia  Hemochromatosis  H/O neck surgery: PCDF 1992,  ACDF 1993  S/P appendectomy: 1974,, right shoulder dislocation 1962  H/O hernia repair: 1985  S/P spinal fusion  S/P shoulder surgery  Status post insertion of drug-eluting stent into right coronary artery for coronary artery disease  History of ERCP    Home Medications:  amlodipine-valsartan 10 mg-320 mg oral tablet: 1 tab(s) orally once a day (12 Jun 2023 09:16)  aspirin 81 mg oral delayed release tablet: 1 tab(s) orally once a day (12 Jun 2023 09:16)  ATORVASTATIN 80 MG TABLET:  (12 Jun 2023 09:16)  FREESTYLE BLAIRE 14 DAY SENSOR: APPLY AS DIRECTED EVERY 14 DAYS (12 Jun 2023 09:16)  HYDROCODONE-ACETAMIN  MG: TAKE 1 TABLET BY MOUTH EVERY 4 TO 6 HOURS AS NEEDED FOR PAIN (12 Jun 2023 09:16)  Insulin Lispro KwikPen 100 units/mL injectable solution: 72/25  12units  (12 Jun 2023 09:16)  methIMAzole 5 mg oral tablet: 1 orally once a day (12 Jun 2023 09:16)  Metoprolol Succinate  mg oral tablet, extended release: 1 tab(s) orally once a day (12 Jun 2023 09:16)  Trulicity Pen 0.75 mg/0.5 mL subcutaneous solution:  (12 Jun 2023 09:16)    Objective:   T(C): 36.7 (06-12-23 @ 09:23), Max: 36.7 (06-12-23 @ 09:23)  HR: 78 (06-12-23 @ 11:20) (78 - 83)  BP: 123/56 (06-12-23 @ 11:20) (109/59 - 156/83)  RR: 16 (06-12-23 @ 11:20) (16 - 16)  SpO2: 95% (06-12-23 @ 11:20) (95% - 97%)    CM: SR  General: Awake, alert, speech clear, no acute distress  Chest: S1, S2, RRR, CTA B/L  Neuro: A&OX3, CN II-XII grossly intact                          13.2   6.05  )-----------( 284      ( 12 Jun 2023 08:40 )             38.7     06-12    142  |  106  |  14.9  ----------------------------<  165  4.7   |  23.0  |  0.80    Ca    9.6      12 Jun 2023 08:40          Assessment/Plan:     Problem List:   1. S/P QUINCY  ·	Vital signs:   ·	     Every 15 minutes times 4 sets,  ·	     then every 30 minutes until Suzanne score returns to baseline,   ·	     then as per unit routine.  ·	Bedrest for 1 hour.    2. Severe MR  ·	Admitted to CT Surgery (Dr. Islas)  ·	Surgical MVR on Wednesday.  ·	[facility] tomorrow in preparation for MVR  ·	****No aspirin*****

## 2023-06-12 NOTE — CONSULT NOTE ADULT - ASSESSMENT
68M, pmhx CAD s/p PCI of RCA 2/16/2021, HTN, HLD, DM, hemochromatosis, neck surgery s/p PCDF & ACDF, appendectomy, hernia repair, R shoulder dislocation s/p repair, HCV s/p known MR initially planned to be evaluated for mitral clip, recent admission in April for syncope secondary to orthostatic hypotension and BELLO in setting of dehydration, found to have strep salivarius/vestilbularis bacteremia, QUINCY with severe MR, inconclusive for endocarditis, pt tx with 4 weeks ceftriaxone, now s/p repeat QUINCY to assess valve, found to have severe MR with multiple mobile echodensities.  68M, pmhx CAD s/p PCI of RCA 2/16/2021, HTN, HLD, DM, hyperthyroidism, hemochromatosis, neck surgery s/p PCDF & ACDF, appendectomy, hernia repair, R shoulder dislocation s/p repair, HCV s/p known MR initially planned to be evaluated for mitral clip, recent admission in April for syncope secondary to orthostatic hypotension and EBLLO in setting of dehydration, found to have strep salivarius/vestilbularis bacteremia, QUINCY with severe MR, inconclusive for endocarditis, pt tx with 4 weeks ceftriaxone, now s/p repeat QUINCY to assess valve, found to have severe MR with multiple mobile echodensities.

## 2023-06-12 NOTE — DISCHARGE NOTE NURSING/CASE MANAGEMENT/SOCIAL WORK - PATIENT PORTAL LINK FT
You can access the FollowMyHealth Patient Portal offered by [facility] by registering at the following website: [email or web address]. By joining [employer] [facility]’s FollowMyHealth portal, you will also be able to view your health information using other applications (apps) compatible with our system.

## 2023-06-13 LAB
ALBUMIN SERPL ELPH-MCNC: 3.8 G/DL — SIGNIFICANT CHANGE UP (ref 3.3–5.2)
ALP SERPL-CCNC: 75 U/L — SIGNIFICANT CHANGE UP (ref 40–120)
ALT FLD-CCNC: 19 U/L — SIGNIFICANT CHANGE UP
ANION GAP SERPL CALC-SCNC: 10 MMOL/L — SIGNIFICANT CHANGE UP (ref 5–17)
APPEARANCE UR: CLEAR — SIGNIFICANT CHANGE UP
AST SERPL-CCNC: 20 U/L — SIGNIFICANT CHANGE UP
BACTERIA # UR AUTO: ABNORMAL
BASOPHILS # BLD AUTO: 0.03 K/UL — SIGNIFICANT CHANGE UP (ref 0–0.2)
BASOPHILS NFR BLD AUTO: 0.5 % — SIGNIFICANT CHANGE UP (ref 0–2)
BILIRUB SERPL-MCNC: 0.6 MG/DL — SIGNIFICANT CHANGE UP (ref 0.4–2)
BILIRUB SERPL-MCNC: 0.6 MG/DL — SIGNIFICANT CHANGE UP (ref 0.4–2)
BILIRUB UR-MCNC: NEGATIVE — SIGNIFICANT CHANGE UP
BUN SERPL-MCNC: 14.4 MG/DL — SIGNIFICANT CHANGE UP (ref 8–20)
CALCIUM SERPL-MCNC: 8.7 MG/DL — SIGNIFICANT CHANGE UP (ref 8.4–10.5)
CHLORIDE SERPL-SCNC: 108 MMOL/L — SIGNIFICANT CHANGE UP (ref 96–108)
CHOLEST SERPL-MCNC: 83 MG/DL — SIGNIFICANT CHANGE UP
CO2 SERPL-SCNC: 23 MMOL/L — SIGNIFICANT CHANGE UP (ref 22–29)
COLOR SPEC: YELLOW — SIGNIFICANT CHANGE UP
CREAT SERPL-MCNC: 0.87 MG/DL — SIGNIFICANT CHANGE UP (ref 0.5–1.3)
CREAT SERPL-MCNC: 0.87 MG/DL — SIGNIFICANT CHANGE UP (ref 0.5–1.3)
DIFF PNL FLD: ABNORMAL
EGFR: 94 ML/MIN/1.73M2 — SIGNIFICANT CHANGE UP
EGFR: 94 ML/MIN/1.73M2 — SIGNIFICANT CHANGE UP
EOSINOPHIL # BLD AUTO: 0.39 K/UL — SIGNIFICANT CHANGE UP (ref 0–0.5)
EOSINOPHIL NFR BLD AUTO: 6.2 % — HIGH (ref 0–6)
EPI CELLS # UR: SIGNIFICANT CHANGE UP
GLUCOSE BLDC GLUCOMTR-MCNC: 119 MG/DL — HIGH (ref 70–99)
GLUCOSE BLDC GLUCOMTR-MCNC: 130 MG/DL — HIGH (ref 70–99)
GLUCOSE BLDC GLUCOMTR-MCNC: 146 MG/DL — HIGH (ref 70–99)
GLUCOSE SERPL-MCNC: 127 MG/DL — HIGH (ref 70–99)
GLUCOSE UR QL: NEGATIVE MG/DL — SIGNIFICANT CHANGE UP
HCT VFR BLD CALC: 34.7 % — LOW (ref 39–50)
HDLC SERPL-MCNC: 31 MG/DL — LOW
HGB BLD-MCNC: 11.8 G/DL — LOW (ref 13–17)
IMM GRANULOCYTES NFR BLD AUTO: 0.2 % — SIGNIFICANT CHANGE UP (ref 0–0.9)
INR BLD: 1.29 RATIO — HIGH (ref 0.88–1.16)
KETONES UR-MCNC: NEGATIVE — SIGNIFICANT CHANGE UP
LEUKOCYTE ESTERASE UR-ACNC: NEGATIVE — SIGNIFICANT CHANGE UP
LIPID PNL WITH DIRECT LDL SERPL: 39 MG/DL — SIGNIFICANT CHANGE UP
LYMPHOCYTES # BLD AUTO: 2.16 K/UL — SIGNIFICANT CHANGE UP (ref 1–3.3)
LYMPHOCYTES # BLD AUTO: 34.4 % — SIGNIFICANT CHANGE UP (ref 13–44)
MCHC RBC-ENTMCNC: 29.8 PG — SIGNIFICANT CHANGE UP (ref 27–34)
MCHC RBC-ENTMCNC: 34 GM/DL — SIGNIFICANT CHANGE UP (ref 32–36)
MCV RBC AUTO: 87.6 FL — SIGNIFICANT CHANGE UP (ref 80–100)
MELD SCORE WITH DIALYSIS: 23 POINTS — SIGNIFICANT CHANGE UP
MELD SCORE WITHOUT DIALYSIS: 9 POINTS — SIGNIFICANT CHANGE UP
MONOCYTES # BLD AUTO: 0.59 K/UL — SIGNIFICANT CHANGE UP (ref 0–0.9)
MONOCYTES NFR BLD AUTO: 9.4 % — SIGNIFICANT CHANGE UP (ref 2–14)
NEUTROPHILS # BLD AUTO: 3.09 K/UL — SIGNIFICANT CHANGE UP (ref 1.8–7.4)
NEUTROPHILS NFR BLD AUTO: 49.3 % — SIGNIFICANT CHANGE UP (ref 43–77)
NITRITE UR-MCNC: NEGATIVE — SIGNIFICANT CHANGE UP
NON HDL CHOLESTEROL: 52 MG/DL — SIGNIFICANT CHANGE UP
PH UR: 7 — SIGNIFICANT CHANGE UP (ref 5–8)
PLATELET # BLD AUTO: 281 K/UL — SIGNIFICANT CHANGE UP (ref 150–400)
POTASSIUM SERPL-MCNC: 3.8 MMOL/L — SIGNIFICANT CHANGE UP (ref 3.5–5.3)
POTASSIUM SERPL-SCNC: 3.8 MMOL/L — SIGNIFICANT CHANGE UP (ref 3.5–5.3)
PROT SERPL-MCNC: 6.8 G/DL — SIGNIFICANT CHANGE UP (ref 6.6–8.7)
PROT UR-MCNC: NEGATIVE — SIGNIFICANT CHANGE UP
PROTHROM AB SERPL-ACNC: 15 SEC — HIGH (ref 10.5–13.4)
RBC # BLD: 3.96 M/UL — LOW (ref 4.2–5.8)
RBC # FLD: 14 % — SIGNIFICANT CHANGE UP (ref 10.3–14.5)
RBC CASTS # UR COMP ASSIST: SIGNIFICANT CHANGE UP /HPF (ref 0–4)
SODIUM SERPL-SCNC: 141 MMOL/L — SIGNIFICANT CHANGE UP (ref 135–145)
SODIUM SERPL-SCNC: 142 MMOL/L — SIGNIFICANT CHANGE UP (ref 135–145)
SP GR SPEC: 1.01 — SIGNIFICANT CHANGE UP (ref 1.01–1.02)
T4 FREE SERPL-MCNC: 1.2 NG/DL — SIGNIFICANT CHANGE UP (ref 0.9–1.8)
TRIGL SERPL-MCNC: 63 MG/DL — SIGNIFICANT CHANGE UP
UROBILINOGEN FLD QL: NEGATIVE MG/DL — SIGNIFICANT CHANGE UP
WBC # BLD: 6.27 K/UL — SIGNIFICANT CHANGE UP (ref 3.8–10.5)
WBC # FLD AUTO: 6.27 K/UL — SIGNIFICANT CHANGE UP (ref 3.8–10.5)
WBC UR QL: SIGNIFICANT CHANGE UP /HPF (ref 0–5)

## 2023-06-13 PROCEDURE — 71045 X-RAY EXAM CHEST 1 VIEW: CPT | Mod: 26

## 2023-06-13 PROCEDURE — 93458 L HRT ARTERY/VENTRICLE ANGIO: CPT | Mod: 26

## 2023-06-13 RX ORDER — METHIMAZOLE 10 MG/1
1 TABLET ORAL
Refills: 0 | DISCHARGE

## 2023-06-13 RX ORDER — SODIUM CHLORIDE 9 MG/ML
250 INJECTION INTRAMUSCULAR; INTRAVENOUS; SUBCUTANEOUS ONCE
Refills: 0 | Status: COMPLETED | OUTPATIENT
Start: 2023-06-13 | End: 2023-06-13

## 2023-06-13 RX ORDER — PANTOPRAZOLE SODIUM 20 MG/1
1 TABLET, DELAYED RELEASE ORAL
Refills: 0 | DISCHARGE

## 2023-06-13 RX ORDER — SODIUM CHLORIDE 9 MG/ML
250 INJECTION INTRAMUSCULAR; INTRAVENOUS; SUBCUTANEOUS ONCE
Refills: 0 | Status: DISCONTINUED | OUTPATIENT
Start: 2023-06-13 | End: 2023-06-14

## 2023-06-13 RX ADMIN — SODIUM CHLORIDE 3 MILLILITER(S): 9 INJECTION INTRAMUSCULAR; INTRAVENOUS; SUBCUTANEOUS at 21:57

## 2023-06-13 RX ADMIN — MUPIROCIN 1 APPLICATION(S): 20 OINTMENT TOPICAL at 18:15

## 2023-06-13 RX ADMIN — MUPIROCIN 1 APPLICATION(S): 20 OINTMENT TOPICAL at 05:59

## 2023-06-13 RX ADMIN — CHLORHEXIDINE GLUCONATE 1 APPLICATION(S): 213 SOLUTION TOPICAL at 20:49

## 2023-06-13 RX ADMIN — CHLORHEXIDINE GLUCONATE 15 MILLILITER(S): 213 SOLUTION TOPICAL at 06:02

## 2023-06-13 RX ADMIN — ATORVASTATIN CALCIUM 80 MILLIGRAM(S): 80 TABLET, FILM COATED ORAL at 22:13

## 2023-06-13 RX ADMIN — CHLORHEXIDINE GLUCONATE 1 APPLICATION(S): 213 SOLUTION TOPICAL at 01:40

## 2023-06-13 RX ADMIN — AMLODIPINE BESYLATE 10 MILLIGRAM(S): 2.5 TABLET ORAL at 05:59

## 2023-06-13 RX ADMIN — Medication 100 MILLIGRAM(S): at 05:59

## 2023-06-13 RX ADMIN — PANTOPRAZOLE SODIUM 40 MILLIGRAM(S): 20 TABLET, DELAYED RELEASE ORAL at 06:00

## 2023-06-13 RX ADMIN — SODIUM CHLORIDE 3 MILLILITER(S): 9 INJECTION INTRAMUSCULAR; INTRAVENOUS; SUBCUTANEOUS at 14:00

## 2023-06-13 RX ADMIN — CHLORHEXIDINE GLUCONATE 15 MILLILITER(S): 213 SOLUTION TOPICAL at 20:49

## 2023-06-13 RX ADMIN — SODIUM CHLORIDE 250 MILLILITER(S): 9 INJECTION INTRAMUSCULAR; INTRAVENOUS; SUBCUTANEOUS at 08:46

## 2023-06-13 RX ADMIN — CHLORHEXIDINE GLUCONATE 1 APPLICATION(S): 213 SOLUTION TOPICAL at 05:58

## 2023-06-13 NOTE — PROGRESS NOTE ADULT - PROBLEM SELECTOR PLAN 1
Pre CT Surgery [facility].        ASA: 3       Mallampati: 2       GFR: 94       Creatinine: 0.87       Bleeding Risk: 1.1%       10 Year ASCVD Risk:   No aspirin as per Dr. Islas.   mL IV over 1 hour.  Procedure explained and questions answered. Informed consent obtained. Pre CT Surgery [facility].        ASA: 3       Mallampati: 2       GFR: 94       Creatinine: 0.87       Bleeding Risk: 1.1%  No aspirin as per Dr. Islas.   mL IV over 1 hour.  Procedure explained and questions answered. Informed consent obtained.

## 2023-06-13 NOTE — PROGRESS NOTE ADULT - PROBLEM SELECTOR PLAN 1
s/p QUINCY as above  plan for OR Wednesday 6/14 for MV replacement  preop work up in progress  plan for cardiac cath later this AM    Plan to be discussed with CT Surgery attendings during AM rounds. s/p QUINCY as above  preop work up in progress  plan for cardiac cath later this AM  plan for OR Wednesday 6/14 for MV replacement    Plan to be discussed with CT Surgery attendings during AM rounds.

## 2023-06-13 NOTE — PATIENT PROFILE ADULT - FALL HARM RISK - HARM RISK INTERVENTIONS

## 2023-06-13 NOTE — PATIENT PROFILE ADULT - FUNCTIONAL ASSESSMENT - DAILY ACTIVITY ASSESSMENT TYPE
Called patient to notify her that Zolpidem was approved yesterday.   PA done by Sarah  (see note)   Pt will call pharmacy and call back if any thing further is needed.   
Preferred pharmacy verified and updated.    Pt advised to check w/pharmacy first.    Timeframe:  Pt needs med(s) today.    Please call if any questions, comments or concerns.    Telephone: CELL: [phone]       Pt states that she needs a pre auth for this medication/she has new insurance BC/BS  
Admission

## 2023-06-13 NOTE — PROGRESS NOTE ADULT - SUBJECTIVE AND OBJECTIVE BOX
[facility]                                                         CARDIOLOGY AT [facility]                                                                  [address], [place] [address]                                                         Telephone: [phone]. Fax:[phone]                                                        INTERVENTIONAL CARDIOLOGY PROGRESS NOTE    Reason for follow up: Cardiac catheterization    Update: The patient denies chest pain, SOB or palpitations.    DIAGNOSTIC TESTING:  [X] Echocardiogram (QUINCY): 6/12/2023  Left Ventricle: Left ventricular ejection fraction, by visual estimation, is 55 to 60%.  Right Ventricle: Normal right ventricular size and function.  Left Atrium: Mildly enlarged left atrium. No left atrial appendage thrombus is seen and the left atrial appendage is enlarged. Intact intra-atrial septum without shunt. Color flow doppler and intravenous injection of agitated saline demonstrates the presence of an intact intra atrial septum.  Right Atrium: The right atrium is normal in size.  Pericardium: Trivial pericardial effusion is present.  Mitral Valve: Severe mitral valve regurgitation is seen. There are multiple elongate mobile echo densities one on the tip of anterior leaflet measiuring 1.3 x 0.3 cm , and the other on posterior leaflet measuring 1.2 x 0.3 cm. Flail posterior leaflet. There is very severe eccentric mitral regurgitation. There non existent posterior leaflet.  Tricuspid Valve: Mild tricuspid regurgitation is visualized.  Aortic Valve: The aortic valve is trileaflet. Sclerotic aortic valve with normal opening. No evidence of aortic valve regurgitation is seen.   Sclerotic aortic valve. Artifact due to calcification.  Pulmonic Valve: Structurally normal pulmonic valve, with normal leaflet excursion. No indication of pulmonic valve regurgitation.  Aorta: The aortic root is normal in size and structure. Simple atheroma seen in the aortic arch and descending aorta.  Pulmonary Artery: The main pulmonary artery is normal in size. Normal pulmonary artery pressure.  Venous: A pattern of systolic flow reversal, suggestive of severe mitral regurgitation is recorded from the right upper pulmonary vein.  Shunts: Agitated saline contrast was given intravenously to evaluate for intracardiac shunting. There is no evidence of a patent foramen ovale.    [ ]  Catheterization:  [ ] Stress Test:    [ ]  OTHER: 	    Symptoms:       Angina (Class): N/A       Ischemic Symptoms: GUERIN (CCS class 3 anginal equivalent)    Heart Failure:N/A    Assessment of LVEF:       EF: 55-60%       Assessed by: QUINCY       Date: 6/12/2023    Prior Cardiac Interventions:        PCI: Status post insertion of drug-eluting stent into right coronary artery for coronary artery disease       CABG: N/A    Antianginal Therapies:       Beta Blockers: Toprol 100 mg daily       Calcium Channel Blockers: Amlodipine 10 mg daily       Long Acting Nitrates: N/A       Ranexa: N/A    Associated Risk Factors:  Frailty Assessment (N/A, mild, mod, severe)  Cerebrovascular Disease: N/A  Chronic Lung Disease: N/A  Peripheral Arterial Disease: N/A  Chronic Kidney Disease (if yes, what is GFR): N/A  Uncontrolled Diabetes (if yes, what is HgbA1C or FBS): N/A  Poorly Controlled Hypertension (if yes, what is SBP): N/A  Morbid Obesity (if yes, what is BMI): N/A  History of Recent Ventricular Arrhythmia: N/A  Inability to Ambulate Safely: N/A  Need for Therapeutic Anticoagulation: N/A  Antiplatelet or Contrast Allergy: N/A    HPI: 69 yo male with h/o CAD s/p PCI of RCA 2/16/2021, HTN, HLD, DM, HCV s/p treatment and cardiac murmur with MR. He saw Dr. Desouza for evaluation of moderate to severe MR and was referred to Dr. Islas for mitraClip evaluation. In April he had a syncopal episode and went to [facility] where he was admitted with syncope and endocarditis and completed 4 weeks of antibiotics as of April 12. He is being referred for QUINCY to evaluated mitral valve post endocarditis and if he is still a candidate for MitralClip. He does not report any SOB at this time but has not increased activity. + GUERIN and fatigue.    PAST MEDICAL & SURGICAL HISTORY:  Lung nodule  Severe mitral regurgitation  Aortic stenosis  Chronic hepatitis C virus infection  Coronary artery disease of native artery of native heart with stable angina pectoris  Type 2 diabetes mellitus with other circulatory complication, with long-term current use of insulin  Primary hypertension  Mixed hyperlipidemia  Hemochromatosis  H/O neck surgery: PCDF 1992,  ACDF 1993  S/P appendectomy: 1974,, right shoulder dislocation 1962  H/O hernia repair: 1985  S/P spinal fusion  S/P shoulder surgery  Status post insertion of drug-eluting stent into right coronary artery for coronary artery disease  History of ERCP    MEDICATIONS  (STANDING):  amLODIPine   Tablet 10 milliGRAM(s) Oral daily  atorvastatin 80 milliGRAM(s) Oral at bedtime  chlorhexidine 0.12% Liquid 15 milliLiter(s) Swish and Spit two times a day  chlorhexidine 4% Liquid 1 Application(s) Topical Once  chlorhexidine 4% Liquid 1 Application(s) Topical two times a day  dextrose 5%. 1000 milliLiter(s) (50 mL/Hr) IV Continuous <Continuous>  dextrose 5%. 1000 milliLiter(s) (100 mL/Hr) IV Continuous <Continuous>  dextrose 50% Injectable 25 Gram(s) IV Push once  dextrose 50% Injectable 25 Gram(s) IV Push once  dextrose 50% Injectable 12.5 Gram(s) IV Push once  glucagon  Injectable 1 milliGRAM(s) IntraMuscular once  insulin lispro (ADMELOG) corrective regimen sliding scale   SubCutaneous Before meals and at bedtime  methimazole 5 milliGRAM(s) Oral daily  metoprolol succinate  milliGRAM(s) Oral daily  mupirocin 2% Ointment 1 Application(s) Both Nostrils every 12 hours  pantoprazole    Tablet 40 milliGRAM(s) Oral before breakfast  sodium chloride 0.9% lock flush 3 milliLiter(s) IV Push every 8 hours    MEDICATIONS  (PRN):  dextrose Oral Gel 15 Gram(s) Oral once PRN Blood Glucose LESS THAN 70 milliGRAM(s)/deciliter  oxycodone    5 mG/acetaminophen 325 mG 1 Tablet(s) Oral every 4 hours PRN Mild Pain (1 - 3)  oxycodone    5 mG/acetaminophen 325 mG 2 Tablet(s) Oral every 4 hours PRN Moderate Pain (4 - 6)      Review of symptoms:   Cardiac:  No chest pain. No dyspnea. No palpitations.  Respiratory: no cough. No dyspnea  Gastrointestinal: No diarrhea. No abdominal pain. No bleeding.   Neuro: No focal neuro complaints.    Vitals:  T(C): 36.9 (06-13-23 @ 05:47), Max: 36.9 (06-13-23 @ 05:47)  HR: 70 (06-13-23 @ 05:47) (70 - 83)  BP: 116/65 (06-13-23 @ 05:47) (109/59 - 166/74)  RR: 15 (06-13-23 @ 05:47) (15 - 16)  SpO2: 97% (06-13-23 @ 05:47) (95% - 98%)  I&O's Summary    Weight (kg): 73.028 (06-12 @ 08:46)    PHYSICAL EXAM:  Appearance: Comfortable. No acute distress  HEENT:  Atraumatic. Normocephalic.  Normal oral mucosa  Neurologic: A & O x 3, no gross focal deficits.  Cardiovascular: RRR S1 S2, No murmur, no rubs/gallops. No JVD  Respiratory: Lungs clear to auscultation, unlabored   Gastrointestinal:  Soft, Non-tender, + BS  Lower Extremities: No edema  Psychiatry: Patient is calm. No agitation.   Skin: warm and dry.    LABS:	 	                        11.8   6.27  )-----------( 281      ( 13 Jun 2023 05:00 )             34.7     06-13    142  |  108  |  14.4  ----------------------------<  127  3.8   |  23.0  |  0.87    Ca    8.7      13 Jun 2023 05:00    TPro  6.8  /  Alb  3.8  /  TBili  0.6  /  DBili  x   /  AST  20  /  ALT  19  /  AlkPhos  75  06-13    PT/INR - ( 13 Jun 2023 05:00 )   PT: 15.0 sec;   INR: 1.29 ratio    PTT - ( 12 Jun 2023 21:00 )  PTT:31.5 sec    TELEMETRY:     ECG:

## 2023-06-13 NOTE — PROGRESS NOTE ADULT - SUBJECTIVE AND OBJECTIVE BOX
Brief summary:  68yMale seen and assessed at bedside.  Pt laying comfortably in hospital bed in NAD on room air.  States no current physical complaints at this time.  Denies f/c, n/v, chest pain, sob, abdominal pain, d/c, urinary symptoms.  ROS negative x 10.    Overnight events:  None.  Hospital course progressing as expected.        PAST MEDICAL & SURGICAL HISTORY:  Lung nodule    Severe mitral regurgitation    Aortic stenosis    Chronic hepatitis C virus infection    Coronary artery disease of native artery of native heart with stable angina pectoris    Type 2 diabetes mellitus with other circulatory complication, with long-term current use of insulin    Primary hypertension    Mixed hyperlipidemia    Hemochromatosis    H/O neck surgery  PCDF 1992,  ACDF 1993    S/P appendectomy  1974,  right shoulder dislocation 1962    H/O hernia repair  1985    S/P spinal fusion    S/P shoulder surgery    Status post insertion of drug-eluting stent into right coronary artery for coronary artery disease    History of ERCP        Medications:  amLODIPine   Tablet 10 milliGRAM(s) Oral daily  atorvastatin 80 milliGRAM(s) Oral at bedtime  chlorhexidine 0.12% Liquid 15 milliLiter(s) Swish and Spit two times a day  chlorhexidine 4% Liquid 1 Application(s) Topical two times a day  chlorhexidine 4% Liquid 1 Application(s) Topical Once  dextrose 5%. 1000 milliLiter(s) IV Continuous <Continuous>  dextrose 5%. 1000 milliLiter(s) IV Continuous <Continuous>  dextrose 50% Injectable 25 Gram(s) IV Push once  dextrose 50% Injectable 12.5 Gram(s) IV Push once  dextrose 50% Injectable 25 Gram(s) IV Push once  dextrose Oral Gel 15 Gram(s) Oral once PRN  glucagon  Injectable 1 milliGRAM(s) IntraMuscular once  insulin lispro (ADMELOG) corrective regimen sliding scale   SubCutaneous Before meals and at bedtime  methimazole 5 milliGRAM(s) Oral daily  metoprolol succinate  milliGRAM(s) Oral daily  mupirocin 2% Ointment 1 Application(s) Both Nostrils every 12 hours  oxycodone    5 mG/acetaminophen 325 mG 1 Tablet(s) Oral every 4 hours PRN  oxycodone    5 mG/acetaminophen 325 mG 2 Tablet(s) Oral every 4 hours PRN  pantoprazole    Tablet 40 milliGRAM(s) Oral before breakfast  sodium chloride 0.9% lock flush 3 milliLiter(s) IV Push every 8 hours      MEDICATIONS  (PRN):  dextrose Oral Gel 15 Gram(s) Oral once PRN Blood Glucose LESS THAN 70 milliGRAM(s)/deciliter  oxycodone    5 mG/acetaminophen 325 mG 2 Tablet(s) Oral every 4 hours PRN Moderate Pain (4 - 6)  oxycodone    5 mG/acetaminophen 325 mG 1 Tablet(s) Oral every 4 hours PRN Mild Pain (1 - 3)        Daily Review:    Height (cm): 167.6 (06-12 @ 08:46)  Weight (kg): 73.028 (06-12 @ 08:46)  BMI (kg/m2): 26 (06-12 @ 08:46)  BSA (m2): 1.82 (06-12 @ 08:46)               13.2   6.05  )-----------( 284      ( 12 Jun 2023 08:40 )             38.7   06-12    142  |  106  |  14.9  ----------------------------<  165<H>  4.7   |  23.0  |  0.80    Ca    9.6      12 Jun 2023 08:40    PT/INR - ( 12 Jun 2023 21:00 )   PT: 14.3 sec;   INR: 1.23 ratio      PTT - ( 12 Jun 2023 21:00 )  PTT:31.5 sec      T(C): 36.7 (06-12-23 @ 09:23), Max: 36.7 (06-12-23 @ 09:23)  HR: 83 (06-12-23 @ 20:00) (74 - 83)  BP: 131/71 (06-12-23 @ 20:00) (109/59 - 166/74)  RR: 16 (06-12-23 @ 20:00) (16 - 16)  SpO2: 96% (06-12-23 @ 20:00) (95% - 97%)      CAPILLARY BLOOD GLUCOSE    POCT Blood Glucose.: 104 mg/dL (12 Jun 2023 23:43)  POCT Blood Glucose.: 151 mg/dL (12 Jun 2023 17:45)        Physical Exam    Neuro: A+O x 3, non-focal, speech clear and intact  Pulm: coarse breath sounds bilaterally, no wheezing or rales  CV: RRR, +S1S2  Abd: soft, NT, ND, normoactive bowel sounds  Ext: +DP Pulses b/l, +PT pulses, no edema        QUINCY 6/12/23    < from: QUINCY Echo Doppler (06.12.23 @ 09:48) >    PHYSICIAN INTERPRETATION:  Left Ventricle:  Left ventricular ejection fraction, by visual estimation, is 55 to 60%.  Right Ventricle: Normal right ventricular size and function.  Left Atrium: Mildly enlarged left atrium. No left atrial appendage   thrombus is seen and the left atrial appendage is enlarged. Intact   intra-atrial septum without shunt. Color flow doppler and intravenous   injection of agitated saline demonstrates the presence of an intact intra   atrial septum.  Right Atrium: The right atrium is normal in size.  Pericardium: Trivial pericardial effusion is present.  Mitral Valve: Severe mitral valve regurgitation is seen. There are   multiple elongate mobile echo densities one on the tip of anterior   leaflet measiuring 1.3 x 0.3 cm , and the other on posterior leaflet   measuring 1.2 x 0.3 cm. Flail posterior leaflet. There is very severe   eccentric mitral regurgitation. There non existent posterior leaflet.  Tricuspid Valve: Mild tricuspid regurgitation is visualized.  Aortic Valve: The aortic valve is trileaflet. Sclerotic aortic valve with   normal opening. No evidence of aortic valve regurgitation is seen.   Sclerotic aortic valve. Artifact due to calcification.  Pulmonic Valve: Structurally normal pulmonic valve, with normal leaflet   excursion. No indication of pulmonic valve regurgitation.  Aorta: The aortic root is normal in size and structure. Simple atheroma   seen in the aortic arch and descending aorta.  Pulmonary Artery: The main pulmonary artery is normal in size. Normal   pulmonary artery pressure.  Venous: A pattern of systolic flow reversal, suggestive of severe mitral   regurgitation is recorded from the right upper pulmonary vein.  Shunts: Agitated saline contrast was given intravenously to evaluate for   intracardiac shunting. There is no evidence of a patent foramen ovale.      Summary:   1. (+) vegetations/mass. No cardiac thrombus or shunts visualized.   2. Mildly enlargedleft atrium.   3. No left atrial or left atrial appendage thrombus visualized. Left   atrial appendage enlargement and normal left atrial appendage velocities.   No PFO.   4. Left ventricular ejection fraction, by visual estimation, is 55 to   60%.  5. The right atrium is normal in size.   6. Normal right ventricular size and function.   7. There are multiple elongate mobile echo densities one on the tip of   anterior leaflet measiuring 1.3 x 0.3 cm , and the other on posterior   leaflet measuring 1.2 x 0.3 cm. Flail posterior leaflet. There is very   severe eccentric mitral regurgitation. There non existent posterior   leaflet.   8. Severe mitral valve regurgitation.   9. Mild tricuspid regurgitation.  10. Trivial pericardial effusion.    MD Cornell, RPVI Electronically signed on 6/12/2023 at 11:17:41   AM        *** Final ***    < end of copied text >        Bilateral Carotid Duplex 6/12/23    < from: US Duplex Carotid Arteries Complete, Bilateral (06.12.23 @ 17:14) >    ACC: [id number] EXAM:  US DPLX CAROTIDS COMPL BI   ORDERED BY: CINDY REDDING     PROCEDURE DATE:  06/12/2023          INTERPRETATION:  CLINICAL INFORMATION: Preoperative carotid duplex   Doppler ultrasound    COMPARISON: None available.    TECHNIQUE: Grayscale, color and spectral Doppler examination of both   carotid arteries was performed.    FINDINGS:    No elevated velocities or abnormal waveforms are encountered.    Peak systolic velocities are as follows:    RIGHT:  PROX CCA = 97 cm/s  DIST CCA = 72 cm/s  PROX ICA = 57 cm/s  DIST ICA = 54 cm/s  ECA = 73 cm/s    LEFT:  PROX CCA = 88 cm/s  DIST CCA = 71 cm/s  PROX ICA = 55 cm/s  DIST ICA = 57 cm/s  ECA = 77 cm/s    Antegrade flow is noted within both vertebral arteries.    IMPRESSION: No significant hemodynamic stenosis of either internal   carotid artery.    Measurement of carotid stenosis is based on velocity parameters that   correlate the residual internal carotid diameter with that of the more   distal vessel in accordance with a method such as the [place] American   Symptomatic Carotid Endarterectomy Trial (NASCET).    --- End of Report ---      < end of copied text >       Brief summary:  68yMale seen and assessed at bedside.  Pt laying comfortably in hospital bed in NAD on room air.  States no current physical complaints at this time.  Denies f/c, n/v, chest pain, sob, abdominal pain, d/c, urinary symptoms.  ROS negative x 10.    Overnight events:  None.  Hospital course progressing as expected.        PAST MEDICAL & SURGICAL HISTORY:  Lung nodule    Severe mitral regurgitation    Aortic stenosis    Chronic hepatitis C virus infection    Coronary artery disease of native artery of native heart with stable angina pectoris    Type 2 diabetes mellitus with other circulatory complication, with long-term current use of insulin    Primary hypertension    Mixed hyperlipidemia    Hemochromatosis    H/O neck surgery  PCDF 1992,  ACDF 1993    S/P appendectomy  1974,  right shoulder dislocation 1962    H/O hernia repair  1985    S/P spinal fusion    S/P shoulder surgery    Status post insertion of drug-eluting stent into right coronary artery for coronary artery disease    History of ERCP        Medications:  amLODIPine   Tablet 10 milliGRAM(s) Oral daily  atorvastatin 80 milliGRAM(s) Oral at bedtime  chlorhexidine 0.12% Liquid 15 milliLiter(s) Swish and Spit two times a day  chlorhexidine 4% Liquid 1 Application(s) Topical two times a day  chlorhexidine 4% Liquid 1 Application(s) Topical Once  dextrose 5%. 1000 milliLiter(s) IV Continuous <Continuous>  dextrose 5%. 1000 milliLiter(s) IV Continuous <Continuous>  dextrose 50% Injectable 25 Gram(s) IV Push once  dextrose 50% Injectable 12.5 Gram(s) IV Push once  dextrose 50% Injectable 25 Gram(s) IV Push once  dextrose Oral Gel 15 Gram(s) Oral once PRN  glucagon  Injectable 1 milliGRAM(s) IntraMuscular once  insulin lispro (ADMELOG) corrective regimen sliding scale   SubCutaneous Before meals and at bedtime  methimazole 5 milliGRAM(s) Oral daily  metoprolol succinate  milliGRAM(s) Oral daily  mupirocin 2% Ointment 1 Application(s) Both Nostrils every 12 hours  oxycodone    5 mG/acetaminophen 325 mG 1 Tablet(s) Oral every 4 hours PRN  oxycodone    5 mG/acetaminophen 325 mG 2 Tablet(s) Oral every 4 hours PRN  pantoprazole    Tablet 40 milliGRAM(s) Oral before breakfast  sodium chloride 0.9% lock flush 3 milliLiter(s) IV Push every 8 hours      MEDICATIONS  (PRN):  dextrose Oral Gel 15 Gram(s) Oral once PRN Blood Glucose LESS THAN 70 milliGRAM(s)/deciliter  oxycodone    5 mG/acetaminophen 325 mG 2 Tablet(s) Oral every 4 hours PRN Moderate Pain (4 - 6)  oxycodone    5 mG/acetaminophen 325 mG 1 Tablet(s) Oral every 4 hours PRN Mild Pain (1 - 3)        Daily Review:    Height (cm): 167.6 (06-12 @ 08:46)  Weight (kg): 73.028 (06-12 @ 08:46)  BMI (kg/m2): 26 (06-12 @ 08:46)  BSA (m2): 1.82 (06-12 @ 08:46)               13.2   6.05  )-----------( 284      ( 12 Jun 2023 08:40 )             38.7   06-12    142  |  106  |  14.9  ----------------------------<  165<H>  4.7   |  23.0  |  0.80    Ca    9.6      12 Jun 2023 08:40    PT/INR - ( 12 Jun 2023 21:00 )   PT: 14.3 sec;   INR: 1.23 ratio      PTT - ( 12 Jun 2023 21:00 )  PTT:31.5 sec      T(C): 36.7 (06-12-23 @ 09:23), Max: 36.7 (06-12-23 @ 09:23)  HR: 83 (06-12-23 @ 20:00) (74 - 83)  BP: 131/71 (06-12-23 @ 20:00) (109/59 - 166/74)  RR: 16 (06-12-23 @ 20:00) (16 - 16)  SpO2: 96% (06-12-23 @ 20:00) (95% - 97%)      CAPILLARY BLOOD GLUCOSE    POCT Blood Glucose.: 104 mg/dL (12 Jun 2023 23:43)  POCT Blood Glucose.: 151 mg/dL (12 Jun 2023 17:45)        Physical Exam    Neuro: A+O x 3, non-focal, speech clear and intact  Pulm: coarse breath sounds bilaterally, no wheezing or rales  CV: RRR, +S1S2, + murmur  Abd: soft, NT, ND, normoactive bowel sounds  Ext: +DP Pulses b/l, +PT pulses, no edema        QUINCY 6/12/23    < from: QUINCY Echo Doppler (06.12.23 @ 09:48) >    PHYSICIAN INTERPRETATION:  Left Ventricle:  Left ventricular ejection fraction, by visual estimation, is 55 to 60%.  Right Ventricle: Normal right ventricular size and function.  Left Atrium: Mildly enlarged left atrium. No left atrial appendage   thrombus is seen and the left atrial appendage is enlarged. Intact   intra-atrial septum without shunt. Color flow doppler and intravenous   injection of agitated saline demonstrates the presence of an intact intra   atrial septum.  Right Atrium: The right atrium is normal in size.  Pericardium: Trivial pericardial effusion is present.  Mitral Valve: Severe mitral valve regurgitation is seen. There are   multiple elongate mobile echo densities one on the tip of anterior   leaflet measiuring 1.3 x 0.3 cm , and the other on posterior leaflet   measuring 1.2 x 0.3 cm. Flail posterior leaflet. There is very severe   eccentric mitral regurgitation. There non existent posterior leaflet.  Tricuspid Valve: Mild tricuspid regurgitation is visualized.  Aortic Valve: The aortic valve is trileaflet. Sclerotic aortic valve with   normal opening. No evidence of aortic valve regurgitation is seen.   Sclerotic aortic valve. Artifact due to calcification.  Pulmonic Valve: Structurally normal pulmonic valve, with normal leaflet   excursion. No indication of pulmonic valve regurgitation.  Aorta: The aortic root is normal in size and structure. Simple atheroma   seen in the aortic arch and descending aorta.  Pulmonary Artery: The main pulmonary artery is normal in size. Normal   pulmonary artery pressure.  Venous: A pattern of systolic flow reversal, suggestive of severe mitral   regurgitation is recorded from the right upper pulmonary vein.  Shunts: Agitated saline contrast was given intravenously to evaluate for   intracardiac shunting. There is no evidence of a patent foramen ovale.      Summary:   1. (+) vegetations/mass. No cardiac thrombus or shunts visualized.   2. Mildly enlargedleft atrium.   3. No left atrial or left atrial appendage thrombus visualized. Left   atrial appendage enlargement and normal left atrial appendage velocities.   No PFO.   4. Left ventricular ejection fraction, by visual estimation, is 55 to   60%.  5. The right atrium is normal in size.   6. Normal right ventricular size and function.   7. There are multiple elongate mobile echo densities one on the tip of   anterior leaflet measiuring 1.3 x 0.3 cm , and the other on posterior   leaflet measuring 1.2 x 0.3 cm. Flail posterior leaflet. There is very   severe eccentric mitral regurgitation. There non existent posterior   leaflet.   8. Severe mitral valve regurgitation.   9. Mild tricuspid regurgitation.  10. Trivial pericardial effusion.    MD Cornell, RPVI Electronically signed on 6/12/2023 at 11:17:41   AM        *** Final ***    < end of copied text >        Bilateral Carotid Duplex 6/12/23    < from: US Duplex Carotid Arteries Complete, Bilateral (06.12.23 @ 17:14) >    ACC: [id number] EXAM:  US DPLX CAROTIDS COMPL BI   ORDERED BY: CINDY REDDING     PROCEDURE DATE:  06/12/2023          INTERPRETATION:  CLINICAL INFORMATION: Preoperative carotid duplex   Doppler ultrasound    COMPARISON: None available.    TECHNIQUE: Grayscale, color and spectral Doppler examination of both   carotid arteries was performed.    FINDINGS:    No elevated velocities or abnormal waveforms are encountered.    Peak systolic velocities are as follows:    RIGHT:  PROX CCA = 97 cm/s  DIST CCA = 72 cm/s  PROX ICA = 57 cm/s  DIST ICA = 54 cm/s  ECA = 73 cm/s    LEFT:  PROX CCA = 88 cm/s  DIST CCA = 71 cm/s  PROX ICA = 55 cm/s  DIST ICA = 57 cm/s  ECA = 77 cm/s    Antegrade flow is noted within both vertebral arteries.    IMPRESSION: No significant hemodynamic stenosis of either internal   carotid artery.    Measurement of carotid stenosis is based on velocity parameters that   correlate the residual internal carotid diameter with that of the more   distal vessel in accordance with a method such as the [place] American   Symptomatic Carotid Endarterectomy Trial (NASCET).    --- End of Report ---      < end of copied text >

## 2023-06-13 NOTE — PROGRESS NOTE ADULT - SUBJECTIVE AND OBJECTIVE BOX
Department of Cardiology                                                                  [facility]/[place] [address]  [place]                                                            Telephone: [phone]. Fax:[phone]                                                        INTERVENTIONAL CARDIOLOGY CATHETERIZATION NOTE     Subjective:  68y  Male who had a left heart catheterization which showed:  Coronary Angiography   ·	The coronary circulation is right dominant. Cardiac catheterization was performed electively.  LM   ·	Left main artery: Angiography shows no disease. MATTHEW Flow 3.    LAD   ·	Mid left anterior descending: Angiography shows mild atherosclerosis. There is a 40 % De Ines stenosis in the middle third portion of the segment. MATTHEW Flow 3.    CX   ·	Circumflex: Angiography shows mild atherosclerosis. MATTHEW Flow 3.    RCA   ·	Proximal right coronary artery: Angiography shows moderate atherosclerosis. There is a 40 % De Ines stenosis in the proximal third portion of the segment. MATTHEW Flow 3.    Left Heart Cath   ·	Left ventricular function was assessed. All remaining scored wall segments are normal. Global left ventricular function is normal. Ejection fraction was calculated by LV Gram with a value of 67%. The left ventricle is normal in size.  ·	LV to AO pullback was performed and there is no pressure gradient. The left ventricular end diastolic pressure was 30 mmHg. LHC performed:  Aortic valve crossed and left ventricular pressures were obtained.    Valves:  ·	Aortic Valve: There is no aortic valve stenosis.    ·	Mitral Valve: The mitral valve exhibits moderate regurgitation.           Access: Right radial artery       Hemostasis: Radial band       Total Contrast: 70 mL Omnipaque       Total Heparin:        Antiplatelet Given:    PAST MEDICAL & SURGICAL HISTORY:  Lung nodule  Severe mitral regurgitation  Aortic stenosis  Chronic hepatitis C virus infection  Coronary artery disease of native artery of native heart with stable angina pectoris  Type 2 diabetes mellitus with other circulatory complication, with long-term current use of insulin  Primary hypertension  Mixed hyperlipidemia  Hemochromatosis  H/O neck surgery: PCDF 1992,  ACDF 1993  S/P appendectomy: 1974,, right shoulder dislocation 1962  H/O hernia repair: 1985  S/P spinal fusion  S/P shoulder surgery  Status post insertion of drug-eluting stent into right coronary artery for coronary artery disease  History of ERCP    FAMILY HISTORY:  Family history of diabetes mellitus  Family history of diabetes mellitus    Home Medications:  amlodipine-valsartan 10 mg-320 mg oral tablet: 1 tab(s) orally once a day (12 Jun 2023 15:30)  aspirin 81 mg oral delayed release tablet: 1 tab(s) orally once a day (12 Jun 2023 15:30)  ATORVASTATIN 80 MG TABLET:  (12 Jun 2023 15:30)  FREESTYLE BLAIRE 14 DAY SENSOR: APPLY AS DIRECTED EVERY 14 DAYS (12 Jun 2023 09:16)  HYDROCODONE-ACETAMIN  MG: TAKE 1 TABLET BY MOUTH EVERY 4 TO 6 HOURS AS NEEDED FOR PAIN (12 Jun 2023 15:30)  Insulin Lispro KwikPen 100 units/mL injectable solution: 72/25  12units  (12 Jun 2023 09:16)  methIMAzole 5 mg oral tablet: 1 orally once a day (13 Jun 2023 11:05)  Metoprolol Succinate  mg oral tablet, extended release: 1 tab(s) orally once a day (12 Jun 2023 15:30)  Protonix 40 mg oral delayed release tablet: 1 orally once a day (13 Jun 2023 11:05)  Trulicity Pen 0.75 mg/0.5 mL subcutaneous solution:  (12 Jun 2023 09:16)    HPI: 67 yo male with h/o CAD s/p PCI of RCA 2/16/2021, HTN, HLD, DM, HCV s/p treatment and cardiac murmur with MR. He saw Dr. Desouza for evaluation of moderate to severe MR and was referred to Dr. Islas for mitraClip evaluation. In April he had a syncopal episode and went to [facility] where he was admitted with syncope and endocarditis and completed 4 weeks of antibiotics as of April 12. He is being referred for QUINCY to evaluated mitral valve post endocarditis and if he is still a candidate for MitralClip. He does not report any SOB at this time but has not increased activity. + GUERIN and fatigue.    General: No fatigue, no fevers/chills  Respiratory: No dyspnea, no cough, no wheeze  CV: No chest pain, no palpitations  Abd: No nausea  Neuro: No headache, no dizziness    No Known Allergies    Objective:  Vital Signs Last 24 Hrs  T(C): 36.7 (13 Jun 2023 07:42), Max: 36.9 (13 Jun 2023 05:47)  T(F): 98 (13 Jun 2023 07:42), Max: 98.5 (13 Jun 2023 05:47)  HR: 85 (13 Jun 2023 16:00) (70 - 88)  BP: 116/60 (13 Jun 2023 16:00) (116/60 - 146/83)  BP(mean): 83 (13 Jun 2023 16:00) (83 - 109)  RR: 17 (13 Jun 2023 16:00) (15 - 24)  SpO2: 99% (13 Jun 2023 16:00) (95% - 99%)    Parameters below as of 13 Jun 2023 16:00  Patient On (Oxygen Delivery Method): room air    CM: SR  Neuro: A&OX3, CN 2-12 intact  HEENT: NC, AT  Lungs: CTA B/L  CV: S1, S2, no murmur, RRR  Abd: Soft  Extremity: Right radial band: no bleeding, fingers warm with good cap refil    QUINCY: 6/12/2023  Left Ventricle: Left ventricular ejection fraction, by visual estimation, is 55 to 60%.  Right Ventricle: Normal right ventricular size and function.  Left Atrium: Mildly enlarged left atrium. No left atrial appendage thrombus is seen and the left atrial appendage is enlarged. Intact intra-atrial septum without shunt. Color flow doppler and intravenous injection of agitated saline demonstrates the presence of an intact intra atrial septum.  Right Atrium: The right atrium is normal in size.  Pericardium: Trivial pericardial effusion is present.  Mitral Valve: Severe mitral valve regurgitation is seen. There are multiple elongate mobile echo densities one on the tip of anterior leaflet measiuring 1.3 x 0.3 cm , and the other on posterior leaflet measuring 1.2 x 0.3 cm. Flail posterior leaflet. There is very severe eccentric mitral regurgitation. There non existent posterior leaflet.  Tricuspid Valve: Mild tricuspid regurgitation is visualized.  Aortic Valve: The aortic valve is trileaflet. Sclerotic aortic valve with normal opening. No evidence of aortic valve regurgitation is seen.   Sclerotic aortic valve. Artifact due to calcification.  Pulmonic Valve: Structurally normal pulmonic valve, with normal leaflet excursion. No indication of pulmonic valve regurgitation.  Aorta: The aortic root is normal in size and structure. Simple atheroma seen in the aortic arch and descending aorta.  Pulmonary Artery: The main pulmonary artery is normal in size. Normal pulmonary artery pressure.  Venous: A pattern of systolic flow reversal, suggestive of severe mitral regurgitation is recorded from the right upper pulmonary vein.  Shunts: Agitated saline contrast was given intravenously to evaluate for intracardiac shunting. There is no evidence of a patent foramen ovale.                        11.8   6.27  )-----------( 281      ( 13 Jun 2023 05:00 )             34.7     06-13    142  |  108  |  14.4  ----------------------------<  127  3.8   |  23.0  |  0.87    Ca    8.7      13 Jun 2023 05:00    TPro  6.8  /  Alb  3.8  /  TBili  0.6  /  DBili  x   /  AST  20  /  ALT  19  /  AlkPhos  75  06-13    PT/INR - ( 13 Jun 2023 05:00 )   PT: 15.0 sec;   INR: 1.29 ratio    PTT - ( 12 Jun 2023 21:00 )  PTT:31.5 sec    Lipids       Total Cholesterol: 83       Triglycerides: 63       HDL: 31       Non-HDL: 52       LDL: 39    HgbA1C: 6.3%

## 2023-06-13 NOTE — PATIENT PROFILE ADULT - HOW MUCH WEIGHT HAVE YOU LOST?
I have personally provided the amount of critical care time documented below concurrently with the resident/fellow.  This time excludes time spent on separate procedures and time spent teaching. I have reviewed the resident’s / fellow’s documentation and I agree with the history, exam, and assessment and plan of care. 14-23 lbs (2)

## 2023-06-13 NOTE — PATIENT PROFILE ADULT - FUNCTIONAL ASSESSMENT - DAILY ACTIVITY 4.
Pt via triage for abdominal pain, diarrhea, blood in stool and nausea. Pt also c/o of right knee pain, denies injury. Pt with hx of ulcerative colitis, rectal bleeding and blood transfusion. Pt denies fever, vomiting, cp, sob, dizziness. Masked.  
4 = No assist / stand by assistance

## 2023-06-14 ENCOUNTER — APPOINTMENT (OUTPATIENT)
Dept: CARDIOTHORACIC SURGERY | Facility: HOSPITAL | Age: 68
End: 2023-06-14

## 2023-06-14 ENCOUNTER — RESULT REVIEW (OUTPATIENT)
Age: 68
End: 2023-06-14

## 2023-06-14 DIAGNOSIS — B19.20 UNSPECIFIED VIRAL HEPATITIS C WITHOUT HEPATIC COMA: ICD-10-CM

## 2023-06-14 LAB
ACANTHOCYTES BLD QL SMEAR: SLIGHT — SIGNIFICANT CHANGE UP
ALBUMIN SERPL ELPH-MCNC: 3.2 G/DL — LOW (ref 3.3–5.2)
ALP SERPL-CCNC: 61 U/L — SIGNIFICANT CHANGE UP (ref 40–120)
ALT FLD-CCNC: 17 U/L — SIGNIFICANT CHANGE UP
ANION GAP SERPL CALC-SCNC: 10 MMOL/L — SIGNIFICANT CHANGE UP (ref 5–17)
ANION GAP SERPL CALC-SCNC: 12 MMOL/L — SIGNIFICANT CHANGE UP (ref 5–17)
ANION GAP SERPL CALC-SCNC: 13 MMOL/L — SIGNIFICANT CHANGE UP (ref 5–17)
APTT BLD: 30.8 SEC — SIGNIFICANT CHANGE UP (ref 27.5–35.5)
APTT BLD: 31.1 SEC — SIGNIFICANT CHANGE UP (ref 27.5–35.5)
AST SERPL-CCNC: 40 U/L — HIGH
BASE EXCESS BLDA CALC-SCNC: -1.1 MMOL/L — SIGNIFICANT CHANGE UP (ref -2–3)
BASE EXCESS BLDA CALC-SCNC: -1.1 MMOL/L — SIGNIFICANT CHANGE UP (ref -2–3)
BASE EXCESS BLDA CALC-SCNC: -2.4 MMOL/L — LOW (ref -2–3)
BASE EXCESS BLDA CALC-SCNC: -3.8 MMOL/L — LOW (ref -2–3)
BASE EXCESS BLDA CALC-SCNC: 0 MMOL/L — SIGNIFICANT CHANGE UP (ref -2–3)
BASE EXCESS BLDA CALC-SCNC: 0.3 MMOL/L — SIGNIFICANT CHANGE UP (ref -2–3)
BASE EXCESS BLDA CALC-SCNC: 0.3 MMOL/L — SIGNIFICANT CHANGE UP (ref -2–3)
BASE EXCESS BLDA CALC-SCNC: 2.1 MMOL/L — SIGNIFICANT CHANGE UP (ref -2–3)
BASE EXCESS BLDA CALC-SCNC: 2.2 MMOL/L — SIGNIFICANT CHANGE UP (ref -2–3)
BASE EXCESS BLDV CALC-SCNC: -1.3 MMOL/L — SIGNIFICANT CHANGE UP (ref -2–3)
BASE EXCESS BLDV CALC-SCNC: 1.2 MMOL/L — SIGNIFICANT CHANGE UP (ref -2–3)
BASE EXCESS BLDV CALC-SCNC: 2.7 MMOL/L — SIGNIFICANT CHANGE UP (ref -2–3)
BASOPHILS # BLD AUTO: 0.14 K/UL — SIGNIFICANT CHANGE UP (ref 0–0.2)
BASOPHILS NFR BLD AUTO: 0.9 % — SIGNIFICANT CHANGE UP (ref 0–2)
BILIRUB SERPL-MCNC: 1 MG/DL — SIGNIFICANT CHANGE UP (ref 0.4–2)
BUN SERPL-MCNC: 13.2 MG/DL — SIGNIFICANT CHANGE UP (ref 8–20)
BUN SERPL-MCNC: 13.3 MG/DL — SIGNIFICANT CHANGE UP (ref 8–20)
BUN SERPL-MCNC: 14.1 MG/DL — SIGNIFICANT CHANGE UP (ref 8–20)
CA-I BLDA-SCNC: 0.92 MMOL/L — LOW (ref 1.15–1.33)
CA-I BLDA-SCNC: 0.97 MMOL/L — LOW (ref 1.15–1.33)
CA-I BLDA-SCNC: 0.97 MMOL/L — LOW (ref 1.15–1.33)
CA-I BLDA-SCNC: 1.03 MMOL/L — LOW (ref 1.15–1.33)
CA-I BLDA-SCNC: 1.14 MMOL/L — LOW (ref 1.15–1.33)
CA-I BLDA-SCNC: 1.18 MMOL/L — SIGNIFICANT CHANGE UP (ref 1.15–1.33)
CA-I BLDA-SCNC: 1.19 MMOL/L — SIGNIFICANT CHANGE UP (ref 1.15–1.33)
CA-I BLDA-SCNC: 1.22 MMOL/L — SIGNIFICANT CHANGE UP (ref 1.15–1.33)
CA-I BLDA-SCNC: 1.23 MMOL/L — SIGNIFICANT CHANGE UP (ref 1.15–1.33)
CA-I SERPL-SCNC: 0.94 MMOL/L — LOW (ref 1.15–1.33)
CA-I SERPL-SCNC: 0.98 MMOL/L — LOW (ref 1.15–1.33)
CA-I SERPL-SCNC: 1 MMOL/L — LOW (ref 1.15–1.33)
CALCIUM SERPL-MCNC: 8.9 MG/DL — SIGNIFICANT CHANGE UP (ref 8.4–10.5)
CALCIUM SERPL-MCNC: 9.1 MG/DL — SIGNIFICANT CHANGE UP (ref 8.4–10.5)
CALCIUM SERPL-MCNC: 9.5 MG/DL — SIGNIFICANT CHANGE UP (ref 8.4–10.5)
CHLORIDE BLDA-SCNC: 107 MMOL/L — SIGNIFICANT CHANGE UP (ref 96–108)
CHLORIDE BLDA-SCNC: 107 MMOL/L — SIGNIFICANT CHANGE UP (ref 96–108)
CHLORIDE BLDA-SCNC: 108 MMOL/L — SIGNIFICANT CHANGE UP (ref 96–108)
CHLORIDE BLDA-SCNC: 108 MMOL/L — SIGNIFICANT CHANGE UP (ref 96–108)
CHLORIDE BLDA-SCNC: 109 MMOL/L — HIGH (ref 96–108)
CHLORIDE BLDA-SCNC: 110 MMOL/L — HIGH (ref 96–108)
CHLORIDE BLDA-SCNC: 114 MMOL/L — HIGH (ref 96–108)
CHLORIDE BLDV-SCNC: 108 MMOL/L — SIGNIFICANT CHANGE UP (ref 96–108)
CHLORIDE BLDV-SCNC: 109 MMOL/L — HIGH (ref 96–108)
CHLORIDE BLDV-SCNC: 109 MMOL/L — HIGH (ref 96–108)
CHLORIDE SERPL-SCNC: 107 MMOL/L — SIGNIFICANT CHANGE UP (ref 96–108)
CHLORIDE SERPL-SCNC: 107 MMOL/L — SIGNIFICANT CHANGE UP (ref 96–108)
CHLORIDE SERPL-SCNC: 110 MMOL/L — HIGH (ref 96–108)
CK MB CFR SERPL CALC: 37 NG/ML — HIGH (ref 0–6.7)
CK SERPL-CCNC: 322 U/L — HIGH (ref 30–200)
CO2 SERPL-SCNC: 22 MMOL/L — SIGNIFICANT CHANGE UP (ref 22–29)
CO2 SERPL-SCNC: 22 MMOL/L — SIGNIFICANT CHANGE UP (ref 22–29)
CO2 SERPL-SCNC: 23 MMOL/L — SIGNIFICANT CHANGE UP (ref 22–29)
COHGB MFR BLDA: 0.8 % — SIGNIFICANT CHANGE UP
COHGB MFR BLDA: 0.9 % — SIGNIFICANT CHANGE UP
COHGB MFR BLDA: 1.1 % — SIGNIFICANT CHANGE UP
COHGB MFR BLDA: 1.1 % — SIGNIFICANT CHANGE UP
COHGB MFR BLDA: 1.2 % — SIGNIFICANT CHANGE UP
COHGB MFR BLDA: 1.3 % — SIGNIFICANT CHANGE UP
COHGB MFR BLDA: 1.3 % — SIGNIFICANT CHANGE UP
COHGB MFR BLDA: 1.5 % — SIGNIFICANT CHANGE UP
COHGB MFR BLDA: 1.5 % — SIGNIFICANT CHANGE UP
COHGB MFR BLDV: 1.3 % — SIGNIFICANT CHANGE UP
COHGB MFR BLDV: 1.6 % — SIGNIFICANT CHANGE UP
COHGB MFR BLDV: 1.7 % — SIGNIFICANT CHANGE UP
CREAT SERPL-MCNC: 0.65 MG/DL — SIGNIFICANT CHANGE UP (ref 0.5–1.3)
CREAT SERPL-MCNC: 0.65 MG/DL — SIGNIFICANT CHANGE UP (ref 0.5–1.3)
CREAT SERPL-MCNC: 0.88 MG/DL — SIGNIFICANT CHANGE UP (ref 0.5–1.3)
EGFR: 103 ML/MIN/1.73M2 — SIGNIFICANT CHANGE UP
EGFR: 103 ML/MIN/1.73M2 — SIGNIFICANT CHANGE UP
EGFR: 94 ML/MIN/1.73M2 — SIGNIFICANT CHANGE UP
ELLIPTOCYTES BLD QL SMEAR: SIGNIFICANT CHANGE UP
EOSINOPHIL # BLD AUTO: 0.27 K/UL — SIGNIFICANT CHANGE UP (ref 0–0.5)
EOSINOPHIL NFR BLD AUTO: 1.7 % — SIGNIFICANT CHANGE UP (ref 0–6)
FIBRINOGEN PPP-MCNC: 241 MG/DL — SIGNIFICANT CHANGE UP (ref 200–450)
GAS PNL BLDA: SIGNIFICANT CHANGE UP
GAS PNL BLDA: SIGNIFICANT CHANGE UP
GAS PNL BLDV: 138 MMOL/L — SIGNIFICANT CHANGE UP (ref 136–145)
GAS PNL BLDV: 140 MMOL/L — SIGNIFICANT CHANGE UP (ref 136–145)
GAS PNL BLDV: 140 MMOL/L — SIGNIFICANT CHANGE UP (ref 136–145)
GIANT PLATELETS BLD QL SMEAR: PRESENT — SIGNIFICANT CHANGE UP
GLUCOSE BLDA-MCNC: 100 MG/DL — HIGH (ref 70–99)
GLUCOSE BLDA-MCNC: 148 MG/DL — HIGH (ref 70–99)
GLUCOSE BLDA-MCNC: 149 MG/DL — HIGH (ref 70–99)
GLUCOSE BLDA-MCNC: 156 MG/DL — HIGH (ref 70–99)
GLUCOSE BLDA-MCNC: 157 MG/DL — HIGH (ref 70–99)
GLUCOSE BLDA-MCNC: 158 MG/DL — HIGH (ref 70–99)
GLUCOSE BLDA-MCNC: 160 MG/DL — HIGH (ref 70–99)
GLUCOSE BLDA-MCNC: 176 MG/DL — HIGH (ref 70–99)
GLUCOSE BLDA-MCNC: 186 MG/DL — HIGH (ref 70–99)
GLUCOSE BLDC GLUCOMTR-MCNC: 100 MG/DL — HIGH (ref 70–99)
GLUCOSE BLDC GLUCOMTR-MCNC: 106 MG/DL — HIGH (ref 70–99)
GLUCOSE BLDC GLUCOMTR-MCNC: 125 MG/DL — HIGH (ref 70–99)
GLUCOSE BLDC GLUCOMTR-MCNC: 135 MG/DL — HIGH (ref 70–99)
GLUCOSE BLDC GLUCOMTR-MCNC: 141 MG/DL — HIGH (ref 70–99)
GLUCOSE BLDC GLUCOMTR-MCNC: 142 MG/DL — HIGH (ref 70–99)
GLUCOSE BLDC GLUCOMTR-MCNC: 149 MG/DL — HIGH (ref 70–99)
GLUCOSE BLDC GLUCOMTR-MCNC: 157 MG/DL — HIGH (ref 70–99)
GLUCOSE BLDV-MCNC: 151 MG/DL — HIGH (ref 70–99)
GLUCOSE BLDV-MCNC: 157 MG/DL — HIGH (ref 70–99)
GLUCOSE BLDV-MCNC: 161 MG/DL — HIGH (ref 70–99)
GLUCOSE SERPL-MCNC: 143 MG/DL — HIGH (ref 70–99)
GLUCOSE SERPL-MCNC: 153 MG/DL — HIGH (ref 70–99)
GLUCOSE SERPL-MCNC: 164 MG/DL — HIGH (ref 70–99)
GRAM STN FLD: SIGNIFICANT CHANGE UP
HCO3 BLDA-SCNC: 20 MMOL/L — LOW (ref 21–28)
HCO3 BLDA-SCNC: 22 MMOL/L — SIGNIFICANT CHANGE UP (ref 21–28)
HCO3 BLDA-SCNC: 23 MMOL/L — SIGNIFICANT CHANGE UP (ref 21–28)
HCO3 BLDA-SCNC: 24 MMOL/L — SIGNIFICANT CHANGE UP (ref 21–28)
HCO3 BLDA-SCNC: 25 MMOL/L — SIGNIFICANT CHANGE UP (ref 21–28)
HCO3 BLDA-SCNC: 26 MMOL/L — SIGNIFICANT CHANGE UP (ref 21–28)
HCO3 BLDA-SCNC: 27 MMOL/L — SIGNIFICANT CHANGE UP (ref 21–28)
HCO3 BLDV-SCNC: 24 MMOL/L — SIGNIFICANT CHANGE UP (ref 22–29)
HCO3 BLDV-SCNC: 26 MMOL/L — SIGNIFICANT CHANGE UP (ref 22–29)
HCO3 BLDV-SCNC: 27 MMOL/L — SIGNIFICANT CHANGE UP (ref 22–29)
HCT VFR BLD CALC: 31.1 % — LOW (ref 39–50)
HCT VFR BLD CALC: 36.3 % — LOW (ref 39–50)
HCT VFR BLDA CALC: 25 % — SIGNIFICANT CHANGE UP
HCT VFR BLDA CALC: 28 % — SIGNIFICANT CHANGE UP
HCT VFR BLDA CALC: 29 % — SIGNIFICANT CHANGE UP
HCT VFR BLDA CALC: 30 % — SIGNIFICANT CHANGE UP
HCT VFR BLDA CALC: 31 % — SIGNIFICANT CHANGE UP
HCT VFR BLDA CALC: 32 % — SIGNIFICANT CHANGE UP
HCT VFR BLDA CALC: 33 % — SIGNIFICANT CHANGE UP
HCT VFR BLDA CALC: 33 % — SIGNIFICANT CHANGE UP
HCT VFR BLDA CALC: 40 % — SIGNIFICANT CHANGE UP
HGB BLD CALC-MCNC: 9.3 G/DL — LOW (ref 12.6–17.4)
HGB BLD CALC-MCNC: 9.3 G/DL — LOW (ref 12.6–17.4)
HGB BLD CALC-MCNC: 9.9 G/DL — LOW (ref 12.6–17.4)
HGB BLD-MCNC: 10.9 G/DL — LOW (ref 13–17)
HGB BLD-MCNC: 12.3 G/DL — LOW (ref 13–17)
HGB BLDA-MCNC: 10.4 G/DL — LOW (ref 12.6–17.4)
HGB BLDA-MCNC: 10.7 G/DL — LOW (ref 12.6–17.4)
HGB BLDA-MCNC: 11 G/DL — LOW (ref 12.6–17.4)
HGB BLDA-MCNC: 11 G/DL — LOW (ref 12.6–17.4)
HGB BLDA-MCNC: 13.4 G/DL — SIGNIFICANT CHANGE UP (ref 12.6–17.4)
HGB BLDA-MCNC: 8.2 G/DL — LOW (ref 12.6–17.4)
HGB BLDA-MCNC: 9.2 G/DL — LOW (ref 12.6–17.4)
HGB BLDA-MCNC: 9.3 G/DL — LOW (ref 12.6–17.4)
HGB BLDA-MCNC: 9.8 G/DL — LOW (ref 12.6–17.4)
INR BLD: 1.25 RATIO — HIGH (ref 0.88–1.16)
INR BLD: 1.46 RATIO — HIGH (ref 0.88–1.16)
LACTATE BLDA-MCNC: 1 MMOL/L — SIGNIFICANT CHANGE UP (ref 0.5–2)
LACTATE BLDA-MCNC: 1.1 MMOL/L — SIGNIFICANT CHANGE UP (ref 0.5–2)
LACTATE BLDA-MCNC: 1.2 MMOL/L — SIGNIFICANT CHANGE UP (ref 0.5–2)
LACTATE BLDA-MCNC: 1.5 MMOL/L — SIGNIFICANT CHANGE UP (ref 0.5–2)
LACTATE BLDA-MCNC: 1.6 MMOL/L — SIGNIFICANT CHANGE UP (ref 0.5–2)
LACTATE BLDA-MCNC: 1.6 MMOL/L — SIGNIFICANT CHANGE UP (ref 0.5–2)
LACTATE BLDA-MCNC: 1.9 MMOL/L — SIGNIFICANT CHANGE UP (ref 0.5–2)
LACTATE BLDA-MCNC: 2.1 MMOL/L — HIGH (ref 0.5–2)
LACTATE BLDA-MCNC: 2.2 MMOL/L — HIGH (ref 0.5–2)
LACTATE BLDV-MCNC: 1.6 MMOL/L — SIGNIFICANT CHANGE UP (ref 0.5–2)
LACTATE BLDV-MCNC: 1.6 MMOL/L — SIGNIFICANT CHANGE UP (ref 0.5–2)
LACTATE BLDV-MCNC: 1.8 MMOL/L — SIGNIFICANT CHANGE UP (ref 0.5–2)
LYMPHOCYTES # BLD AUTO: 0.82 K/UL — LOW (ref 1–3.3)
LYMPHOCYTES # BLD AUTO: 5.2 % — LOW (ref 13–44)
MAGNESIUM SERPL-MCNC: 2 MG/DL — SIGNIFICANT CHANGE UP (ref 1.6–2.6)
MAGNESIUM SERPL-MCNC: 2.5 MG/DL — SIGNIFICANT CHANGE UP (ref 1.8–2.6)
MAGNESIUM SERPL-MCNC: 2.5 MG/DL — SIGNIFICANT CHANGE UP (ref 1.8–2.6)
MANUAL SMEAR VERIFICATION: SIGNIFICANT CHANGE UP
MCHC RBC-ENTMCNC: 29.3 PG — SIGNIFICANT CHANGE UP (ref 27–34)
MCHC RBC-ENTMCNC: 30.5 PG — SIGNIFICANT CHANGE UP (ref 27–34)
MCHC RBC-ENTMCNC: 33.9 GM/DL — SIGNIFICANT CHANGE UP (ref 32–36)
MCHC RBC-ENTMCNC: 35 GM/DL — SIGNIFICANT CHANGE UP (ref 32–36)
MCV RBC AUTO: 86.4 FL — SIGNIFICANT CHANGE UP (ref 80–100)
MCV RBC AUTO: 87.1 FL — SIGNIFICANT CHANGE UP (ref 80–100)
METHGB MFR BLDA: 0.3 % — SIGNIFICANT CHANGE UP
METHGB MFR BLDA: 0.5 % — SIGNIFICANT CHANGE UP
METHGB MFR BLDA: 0.6 % — SIGNIFICANT CHANGE UP
METHGB MFR BLDA: 0.9 % — SIGNIFICANT CHANGE UP
METHGB MFR BLDA: 1 % — SIGNIFICANT CHANGE UP
METHGB MFR BLDA: 1.3 % — SIGNIFICANT CHANGE UP
METHGB MFR BLDV: 0.4 % — SIGNIFICANT CHANGE UP
METHGB MFR BLDV: 0.4 % — SIGNIFICANT CHANGE UP
METHGB MFR BLDV: 0.8 % — SIGNIFICANT CHANGE UP
MONOCYTES # BLD AUTO: 0.69 K/UL — SIGNIFICANT CHANGE UP (ref 0–0.9)
MONOCYTES NFR BLD AUTO: 4.4 % — SIGNIFICANT CHANGE UP (ref 2–14)
NEUTROPHILS # BLD AUTO: 13.78 K/UL — HIGH (ref 1.8–7.4)
NEUTROPHILS NFR BLD AUTO: 87.8 % — HIGH (ref 43–77)
OVALOCYTES BLD QL SMEAR: SIGNIFICANT CHANGE UP
OXYHGB MFR BLDA: 97 % — HIGH (ref 90–95)
OXYHGB MFR BLDA: 98 % — HIGH (ref 90–95)
PCO2 BLDA: 30 MMHG — LOW (ref 35–48)
PCO2 BLDA: 32 MMHG — LOW (ref 35–48)
PCO2 BLDA: 35 MMHG — SIGNIFICANT CHANGE UP (ref 35–48)
PCO2 BLDA: 35 MMHG — SIGNIFICANT CHANGE UP (ref 35–48)
PCO2 BLDA: 36 MMHG — SIGNIFICANT CHANGE UP (ref 35–48)
PCO2 BLDA: 37 MMHG — SIGNIFICANT CHANGE UP (ref 35–48)
PCO2 BLDA: 40 MMHG — SIGNIFICANT CHANGE UP (ref 35–48)
PCO2 BLDA: 40 MMHG — SIGNIFICANT CHANGE UP (ref 35–48)
PCO2 BLDA: 41 MMHG — SIGNIFICANT CHANGE UP (ref 35–48)
PCO2 BLDV: 38 MMHG — LOW (ref 42–55)
PCO2 BLDV: 42 MMHG — SIGNIFICANT CHANGE UP (ref 42–55)
PCO2 BLDV: 42 MMHG — SIGNIFICANT CHANGE UP (ref 42–55)
PH BLDA: 7.4 — SIGNIFICANT CHANGE UP (ref 7.35–7.45)
PH BLDA: 7.41 — SIGNIFICANT CHANGE UP (ref 7.35–7.45)
PH BLDA: 7.41 — SIGNIFICANT CHANGE UP (ref 7.35–7.45)
PH BLDA: 7.42 — SIGNIFICANT CHANGE UP (ref 7.35–7.45)
PH BLDA: 7.42 — SIGNIFICANT CHANGE UP (ref 7.35–7.45)
PH BLDA: 7.43 — SIGNIFICANT CHANGE UP (ref 7.35–7.45)
PH BLDA: 7.44 — SIGNIFICANT CHANGE UP (ref 7.35–7.45)
PH BLDA: 7.45 — SIGNIFICANT CHANGE UP (ref 7.35–7.45)
PH BLDA: 7.48 — HIGH (ref 7.35–7.45)
PH BLDV: 7.4 — SIGNIFICANT CHANGE UP (ref 7.32–7.43)
PH BLDV: 7.4 — SIGNIFICANT CHANGE UP (ref 7.32–7.43)
PH BLDV: 7.42 — SIGNIFICANT CHANGE UP (ref 7.32–7.43)
PLAT MORPH BLD: NORMAL — SIGNIFICANT CHANGE UP
PLATELET # BLD AUTO: 207 K/UL — SIGNIFICANT CHANGE UP (ref 150–400)
PLATELET # BLD AUTO: 300 K/UL — SIGNIFICANT CHANGE UP (ref 150–400)
PO2 BLDA: 269 MMHG — HIGH (ref 83–108)
PO2 BLDA: 312 MMHG — HIGH (ref 83–108)
PO2 BLDA: 405 MMHG — HIGH (ref 83–108)
PO2 BLDA: 89 MMHG — SIGNIFICANT CHANGE UP (ref 83–108)
PO2 BLDA: >496 MMHG — HIGH (ref 83–108)
PO2 BLDV: 62 MMHG — HIGH (ref 25–45)
PO2 BLDV: 66 MMHG — HIGH (ref 25–45)
PO2 BLDV: 75 MMHG — HIGH (ref 25–45)
POIKILOCYTOSIS BLD QL AUTO: SIGNIFICANT CHANGE UP
POTASSIUM BLDA-SCNC: 3.3 MMOL/L — LOW (ref 3.5–5.1)
POTASSIUM BLDA-SCNC: 3.3 MMOL/L — LOW (ref 3.5–5.1)
POTASSIUM BLDA-SCNC: 3.6 MMOL/L — SIGNIFICANT CHANGE UP (ref 3.5–5.1)
POTASSIUM BLDA-SCNC: 3.6 MMOL/L — SIGNIFICANT CHANGE UP (ref 3.5–5.1)
POTASSIUM BLDA-SCNC: 3.8 MMOL/L — SIGNIFICANT CHANGE UP (ref 3.5–5.1)
POTASSIUM BLDA-SCNC: 4.1 MMOL/L — SIGNIFICANT CHANGE UP (ref 3.5–5.1)
POTASSIUM BLDA-SCNC: 4.5 MMOL/L — SIGNIFICANT CHANGE UP (ref 3.5–5.1)
POTASSIUM BLDA-SCNC: 4.8 MMOL/L — SIGNIFICANT CHANGE UP (ref 3.5–5.1)
POTASSIUM BLDA-SCNC: 5.7 MMOL/L — HIGH (ref 3.5–5.1)
POTASSIUM BLDV-SCNC: 4.6 MMOL/L — SIGNIFICANT CHANGE UP (ref 3.5–5.1)
POTASSIUM BLDV-SCNC: 4.6 MMOL/L — SIGNIFICANT CHANGE UP (ref 3.5–5.1)
POTASSIUM BLDV-SCNC: 5.2 MMOL/L — HIGH (ref 3.5–5.1)
POTASSIUM SERPL-MCNC: 3.6 MMOL/L — SIGNIFICANT CHANGE UP (ref 3.5–5.3)
POTASSIUM SERPL-MCNC: 3.6 MMOL/L — SIGNIFICANT CHANGE UP (ref 3.5–5.3)
POTASSIUM SERPL-MCNC: 3.9 MMOL/L — SIGNIFICANT CHANGE UP (ref 3.5–5.3)
POTASSIUM SERPL-SCNC: 3.6 MMOL/L — SIGNIFICANT CHANGE UP (ref 3.5–5.3)
POTASSIUM SERPL-SCNC: 3.6 MMOL/L — SIGNIFICANT CHANGE UP (ref 3.5–5.3)
POTASSIUM SERPL-SCNC: 3.9 MMOL/L — SIGNIFICANT CHANGE UP (ref 3.5–5.3)
PROT SERPL-MCNC: 5.2 G/DL — LOW (ref 6.6–8.7)
PROTHROM AB SERPL-ACNC: 14.5 SEC — HIGH (ref 10.5–13.4)
PROTHROM AB SERPL-ACNC: 17 SEC — HIGH (ref 10.5–13.4)
RBC # BLD: 3.57 M/UL — LOW (ref 4.2–5.8)
RBC # BLD: 4.2 M/UL — SIGNIFICANT CHANGE UP (ref 4.2–5.8)
RBC # FLD: 13.5 % — SIGNIFICANT CHANGE UP (ref 10.3–14.5)
RBC # FLD: 13.8 % — SIGNIFICANT CHANGE UP (ref 10.3–14.5)
RBC BLD AUTO: ABNORMAL
SAO2 % BLDA: 100 % — HIGH (ref 94–98)
SAO2 % BLDA: 98.5 % — HIGH (ref 94–98)
SAO2 % BLDA: 99.7 % — HIGH (ref 94–98)
SAO2 % BLDV: 95.6 % — HIGH (ref 67–88)
SAO2 % BLDV: 95.9 % — HIGH (ref 67–88)
SAO2 % BLDV: 97.9 % — HIGH (ref 67–88)
SODIUM BLDA-SCNC: 137 MMOL/L — SIGNIFICANT CHANGE UP (ref 136–145)
SODIUM BLDA-SCNC: 138 MMOL/L — SIGNIFICANT CHANGE UP (ref 136–145)
SODIUM BLDA-SCNC: 139 MMOL/L — SIGNIFICANT CHANGE UP (ref 136–145)
SODIUM BLDA-SCNC: 140 MMOL/L — SIGNIFICANT CHANGE UP (ref 136–145)
SODIUM BLDA-SCNC: 140 MMOL/L — SIGNIFICANT CHANGE UP (ref 136–145)
SODIUM BLDA-SCNC: 142 MMOL/L — SIGNIFICANT CHANGE UP (ref 136–145)
SODIUM SERPL-SCNC: 141 MMOL/L — SIGNIFICANT CHANGE UP (ref 135–145)
SODIUM SERPL-SCNC: 142 MMOL/L — SIGNIFICANT CHANGE UP (ref 135–145)
SODIUM SERPL-SCNC: 142 MMOL/L — SIGNIFICANT CHANGE UP (ref 135–145)
SPECIMEN SOURCE: SIGNIFICANT CHANGE UP
TROPONIN T SERPL-MCNC: 0.62 NG/ML — HIGH (ref 0–0.06)
WBC # BLD: 15.69 K/UL — HIGH (ref 3.8–10.5)
WBC # BLD: 6.61 K/UL — SIGNIFICANT CHANGE UP (ref 3.8–10.5)
WBC # FLD AUTO: 15.69 K/UL — HIGH (ref 3.8–10.5)
WBC # FLD AUTO: 6.61 K/UL — SIGNIFICANT CHANGE UP (ref 3.8–10.5)

## 2023-06-14 PROCEDURE — 33430 REPLACEMENT OF MITRAL VALVE: CPT

## 2023-06-14 PROCEDURE — 99291 CRITICAL CARE FIRST HOUR: CPT

## 2023-06-14 PROCEDURE — 93320 DOPPLER ECHO COMPLETE: CPT | Mod: 26

## 2023-06-14 PROCEDURE — 33430 REPLACEMENT OF MITRAL VALVE: CPT | Mod: AS

## 2023-06-14 PROCEDURE — 93312 ECHO TRANSESOPHAGEAL: CPT | Mod: 26

## 2023-06-14 PROCEDURE — 71045 X-RAY EXAM CHEST 1 VIEW: CPT | Mod: 26

## 2023-06-14 PROCEDURE — 99222 1ST HOSP IP/OBS MODERATE 55: CPT

## 2023-06-14 PROCEDURE — 88305 TISSUE EXAM BY PATHOLOGIST: CPT | Mod: 26

## 2023-06-14 PROCEDURE — 93010 ELECTROCARDIOGRAM REPORT: CPT

## 2023-06-14 PROCEDURE — 93325 DOPPLER ECHO COLOR FLOW MAPG: CPT | Mod: 26

## 2023-06-14 PROCEDURE — 88311 DECALCIFY TISSUE: CPT | Mod: 26

## 2023-06-14 PROCEDURE — 76376 3D RENDER W/INTRP POSTPROCES: CPT | Mod: 26

## 2023-06-14 DEVICE — COR-KNOT MINI DEVICE COMBO KIT: Type: IMPLANTABLE DEVICE | Status: FUNCTIONAL

## 2023-06-14 DEVICE — CANNULA ATRASUMP 1/4" X 38CM: Type: IMPLANTABLE DEVICE | Status: FUNCTIONAL

## 2023-06-14 DEVICE — CATH INFUS SL 7FR 20CM: Type: IMPLANTABLE DEVICE | Status: FUNCTIONAL

## 2023-06-14 DEVICE — MEDIASTINAL CATH DRAIN 9MM: Type: IMPLANTABLE DEVICE | Status: FUNCTIONAL

## 2023-06-14 DEVICE — KIT A-LINE 1LUM 20G X 12CM SAFE KIT: Type: IMPLANTABLE DEVICE | Status: FUNCTIONAL

## 2023-06-14 DEVICE — PACING WIRE ORANGE M-25 WINGED WIRE 37MM X 89MM: Type: IMPLANTABLE DEVICE | Status: FUNCTIONAL

## 2023-06-14 DEVICE — CANNULA VENOUS 1 STAGE RIGHT ANGLE 28FR X 3/8": Type: IMPLANTABLE DEVICE | Status: FUNCTIONAL

## 2023-06-14 DEVICE — FLOSEAL FAST PREP 10ML: Type: IMPLANTABLE DEVICE | Status: FUNCTIONAL

## 2023-06-14 DEVICE — COR-KNOT QUICK LOAD SINGLES: Type: IMPLANTABLE DEVICE | Status: FUNCTIONAL

## 2023-06-14 DEVICE — CANNULA VENOUS 1 STAGE RIGHT ANGLE 30FR X 3/8": Type: IMPLANTABLE DEVICE | Status: FUNCTIONAL

## 2023-06-14 DEVICE — IMPLANTABLE DEVICE: Type: IMPLANTABLE DEVICE | Status: FUNCTIONAL

## 2023-06-14 DEVICE — CATH VENT VENTRICULAR PVC 18FR X 4.25" TIP PERFORATION: Type: IMPLANTABLE DEVICE | Status: FUNCTIONAL

## 2023-06-14 DEVICE — KIT CVC 2LUM MAC 9FR CHG: Type: IMPLANTABLE DEVICE | Status: FUNCTIONAL

## 2023-06-14 DEVICE — CANNULA ARTERIAL SOFT-FLOW 21FR EXTENDED VENTED: Type: IMPLANTABLE DEVICE | Status: FUNCTIONAL

## 2023-06-14 DEVICE — CANNULA AORTIC ROOT 12G X 14CM FLANGED: Type: IMPLANTABLE DEVICE | Status: FUNCTIONAL

## 2023-06-14 DEVICE — PACING WIRE WHITE M-25 WINGED WIRE 37MM X 89MM: Type: IMPLANTABLE DEVICE | Status: FUNCTIONAL

## 2023-06-14 RX ORDER — INSULIN HUMAN 100 [IU]/ML
2 INJECTION, SOLUTION SUBCUTANEOUS
Qty: 100 | Refills: 0 | Status: DISCONTINUED | OUTPATIENT
Start: 2023-06-14 | End: 2023-06-16

## 2023-06-14 RX ORDER — SODIUM CHLORIDE 9 MG/ML
1000 INJECTION INTRAMUSCULAR; INTRAVENOUS; SUBCUTANEOUS
Refills: 0 | Status: DISCONTINUED | OUTPATIENT
Start: 2023-06-14 | End: 2023-06-16

## 2023-06-14 RX ORDER — PANTOPRAZOLE SODIUM 20 MG/1
40 TABLET, DELAYED RELEASE ORAL ONCE
Refills: 0 | Status: COMPLETED | OUTPATIENT
Start: 2023-06-14 | End: 2023-06-14

## 2023-06-14 RX ORDER — DEXTROSE 50 % IN WATER 50 %
50 SYRINGE (ML) INTRAVENOUS
Refills: 0 | Status: DISCONTINUED | OUTPATIENT
Start: 2023-06-14 | End: 2023-06-16

## 2023-06-14 RX ORDER — POLYETHYLENE GLYCOL 3350 17 G/17G
17 POWDER, FOR SOLUTION ORAL DAILY
Refills: 0 | Status: DISCONTINUED | OUTPATIENT
Start: 2023-06-14 | End: 2023-06-21

## 2023-06-14 RX ORDER — POTASSIUM CHLORIDE 20 MEQ
10 PACKET (EA) ORAL
Refills: 0 | Status: COMPLETED | OUTPATIENT
Start: 2023-06-14 | End: 2023-06-14

## 2023-06-14 RX ORDER — CEFUROXIME AXETIL 250 MG
1500 TABLET ORAL EVERY 8 HOURS
Refills: 0 | Status: COMPLETED | OUTPATIENT
Start: 2023-06-14 | End: 2023-06-16

## 2023-06-14 RX ORDER — ACETAMINOPHEN 500 MG
1000 TABLET ORAL ONCE
Refills: 0 | Status: COMPLETED | OUTPATIENT
Start: 2023-06-14 | End: 2023-06-14

## 2023-06-14 RX ORDER — CHLORHEXIDINE GLUCONATE 213 G/1000ML
15 SOLUTION TOPICAL EVERY 12 HOURS
Refills: 0 | Status: DISCONTINUED | OUTPATIENT
Start: 2023-06-14 | End: 2023-06-14

## 2023-06-14 RX ORDER — DEXTROSE 50 % IN WATER 50 %
25 SYRINGE (ML) INTRAVENOUS
Refills: 0 | Status: DISCONTINUED | OUTPATIENT
Start: 2023-06-14 | End: 2023-06-16

## 2023-06-14 RX ORDER — ASPIRIN/CALCIUM CARB/MAGNESIUM 324 MG
325 TABLET ORAL DAILY
Refills: 0 | Status: DISCONTINUED | OUTPATIENT
Start: 2023-06-14 | End: 2023-06-15

## 2023-06-14 RX ORDER — PANTOPRAZOLE SODIUM 20 MG/1
40 TABLET, DELAYED RELEASE ORAL DAILY
Refills: 0 | Status: DISCONTINUED | OUTPATIENT
Start: 2023-06-15 | End: 2023-06-21

## 2023-06-14 RX ORDER — NOREPINEPHRINE BITARTRATE/D5W 8 MG/250ML
0.05 PLASTIC BAG, INJECTION (ML) INTRAVENOUS
Qty: 8 | Refills: 0 | Status: DISCONTINUED | OUTPATIENT
Start: 2023-06-14 | End: 2023-06-15

## 2023-06-14 RX ORDER — KETOROLAC TROMETHAMINE 30 MG/ML
15 SYRINGE (ML) INJECTION EVERY 8 HOURS
Refills: 0 | Status: DISCONTINUED | OUTPATIENT
Start: 2023-06-14 | End: 2023-06-16

## 2023-06-14 RX ORDER — HYDROMORPHONE HYDROCHLORIDE 2 MG/ML
0.25 INJECTION INTRAMUSCULAR; INTRAVENOUS; SUBCUTANEOUS ONCE
Refills: 0 | Status: DISCONTINUED | OUTPATIENT
Start: 2023-06-14 | End: 2023-06-14

## 2023-06-14 RX ORDER — SENNA PLUS 8.6 MG/1
2 TABLET ORAL AT BEDTIME
Refills: 0 | Status: DISCONTINUED | OUTPATIENT
Start: 2023-06-14 | End: 2023-06-21

## 2023-06-14 RX ORDER — POTASSIUM CHLORIDE 20 MEQ
10 PACKET (EA) ORAL
Refills: 0 | Status: DISCONTINUED | OUTPATIENT
Start: 2023-06-14 | End: 2023-06-14

## 2023-06-14 RX ORDER — CHLORHEXIDINE GLUCONATE 213 G/1000ML
1 SOLUTION TOPICAL DAILY
Refills: 0 | Status: DISCONTINUED | OUTPATIENT
Start: 2023-06-14 | End: 2023-06-20

## 2023-06-14 RX ORDER — WARFARIN SODIUM 2.5 MG/1
2.5 TABLET ORAL ONCE
Refills: 0 | Status: COMPLETED | OUTPATIENT
Start: 2023-06-14 | End: 2023-06-14

## 2023-06-14 RX ORDER — VANCOMYCIN HCL 1 G
1000 VIAL (EA) INTRAVENOUS EVERY 12 HOURS
Refills: 0 | Status: COMPLETED | OUTPATIENT
Start: 2023-06-14 | End: 2023-06-16

## 2023-06-14 RX ORDER — PROPOFOL 10 MG/ML
5 INJECTION, EMULSION INTRAVENOUS
Qty: 1000 | Refills: 0 | Status: DISCONTINUED | OUTPATIENT
Start: 2023-06-14 | End: 2023-06-14

## 2023-06-14 RX ADMIN — CHLORHEXIDINE GLUCONATE 1 APPLICATION(S): 213 SOLUTION TOPICAL at 16:28

## 2023-06-14 RX ADMIN — Medication 15 MILLIGRAM(S): at 18:29

## 2023-06-14 RX ADMIN — HYDROMORPHONE HYDROCHLORIDE 0.25 MILLIGRAM(S): 2 INJECTION INTRAMUSCULAR; INTRAVENOUS; SUBCUTANEOUS at 16:05

## 2023-06-14 RX ADMIN — Medication 1000 MILLIGRAM(S): at 18:22

## 2023-06-14 RX ADMIN — Medication 325 MILLIGRAM(S): at 18:19

## 2023-06-14 RX ADMIN — Medication 15 MILLIGRAM(S): at 23:00

## 2023-06-14 RX ADMIN — PANTOPRAZOLE SODIUM 40 MILLIGRAM(S): 20 TABLET, DELAYED RELEASE ORAL at 13:03

## 2023-06-14 RX ADMIN — SENNA PLUS 2 TABLET(S): 8.6 TABLET ORAL at 21:13

## 2023-06-14 RX ADMIN — Medication 100 MILLIEQUIVALENT(S): at 13:03

## 2023-06-14 RX ADMIN — SODIUM CHLORIDE 3 MILLILITER(S): 9 INJECTION INTRAMUSCULAR; INTRAVENOUS; SUBCUTANEOUS at 06:15

## 2023-06-14 RX ADMIN — Medication 15 MILLIGRAM(S): at 23:15

## 2023-06-14 RX ADMIN — Medication 400 MILLIGRAM(S): at 17:30

## 2023-06-14 RX ADMIN — Medication 100 MILLIGRAM(S): at 06:12

## 2023-06-14 RX ADMIN — CHLORHEXIDINE GLUCONATE 15 MILLILITER(S): 213 SOLUTION TOPICAL at 06:12

## 2023-06-14 RX ADMIN — Medication 100 MILLIEQUIVALENT(S): at 16:55

## 2023-06-14 RX ADMIN — MUPIROCIN 1 APPLICATION(S): 20 OINTMENT TOPICAL at 06:12

## 2023-06-14 RX ADMIN — Medication 100 MILLIEQUIVALENT(S): at 16:28

## 2023-06-14 RX ADMIN — Medication 100 MILLIEQUIVALENT(S): at 14:12

## 2023-06-14 RX ADMIN — HYDROMORPHONE HYDROCHLORIDE 0.25 MILLIGRAM(S): 2 INJECTION INTRAMUSCULAR; INTRAVENOUS; SUBCUTANEOUS at 14:25

## 2023-06-14 RX ADMIN — CHLORHEXIDINE GLUCONATE 1 APPLICATION(S): 213 SOLUTION TOPICAL at 05:45

## 2023-06-14 RX ADMIN — Medication 100 MILLIGRAM(S): at 16:29

## 2023-06-14 RX ADMIN — Medication 15 MILLIGRAM(S): at 18:57

## 2023-06-14 RX ADMIN — WARFARIN SODIUM 2.5 MILLIGRAM(S): 2.5 TABLET ORAL at 21:13

## 2023-06-14 RX ADMIN — AMLODIPINE BESYLATE 10 MILLIGRAM(S): 2.5 TABLET ORAL at 06:12

## 2023-06-14 RX ADMIN — Medication 250 MILLIGRAM(S): at 20:16

## 2023-06-14 RX ADMIN — HYDROMORPHONE HYDROCHLORIDE 0.25 MILLIGRAM(S): 2 INJECTION INTRAMUSCULAR; INTRAVENOUS; SUBCUTANEOUS at 15:27

## 2023-06-14 RX ADMIN — Medication 100 MILLIEQUIVALENT(S): at 12:19

## 2023-06-14 RX ADMIN — HYDROMORPHONE HYDROCHLORIDE 0.25 MILLIGRAM(S): 2 INJECTION INTRAMUSCULAR; INTRAVENOUS; SUBCUTANEOUS at 15:22

## 2023-06-14 RX ADMIN — Medication 100 MILLIEQUIVALENT(S): at 18:10

## 2023-06-14 NOTE — PROGRESS NOTE ADULT - SUBJECTIVE AND OBJECTIVE BOX
LYDIA PEACE   MRN#: [id number]     The patient is a 68y Male who was seen, evaluated, & examined with the CTICU staff on rounds and later in the day with a multidisciplinary care plan formulated & implemented.  All available clinical, laboratory, radiographic, pharmacologic, electrocardiographic, and intraoperative  data were reviewed & analyzed.      The patient was in the CTICU in critical condition at risk for imminent decompensation secondary to persistent cardiopulmonary dysfunction, cardiovascular dysfunction, hemodynamically significant hypovolemia, hyperlactatemia-acidosis, and stress hyperglycemia.      Respiratory status required full ventilatory support, the following of ABG’s with A-line monitoring, continuous pulse oximetry monitoring, continuous ETCO2 monitoring, and an IV Propofol infusion for support & to evaluate for & prevent further decompensation secondary to persistent cardiopulmonary dysfunction and cardiovascular dysfunction.       Invasive hemodynamic monitoring with an A-line was required for the following of continuous MAP/BP monitoring to ensure adequate cardiovascular support and to evaluate for & help prevent decompensation while receiving intermittent volume expansion and an IV Levophed drip secondary to cardiovascular dysfunction, hemodynamically significant hypovolemia, and hyperlactatemia-acidosis.    Metabolic stability, stress hyperglycemia, & infection prophylaxis required an IV regular Insulin drip & the following of serial glucose levels to help achieve & maintain euglycemia.  Based upon my evaluation and review I maintained the current metabolic therapy.    Patient required critical care management and is at risk for life threatening decompensation  I provided 35 minutes of non-continuous care to the patient.

## 2023-06-14 NOTE — DIETITIAN INITIAL EVALUATION ADULT - NS FNS DIET ORDER
Diet, Regular:   Consistent Carbohydrate {No Snacks} (CSTCHO)  DASH/TLC {Sodium & Cholesterol Restricted} (DASH) (06-14-23 @ 08:43)  Diet, Clear Liquid:   Consistent Carbohydrate {No Snacks} (CSTCHO)  DASH/TLC {Sodium & Cholesterol Restricted} (DASH) (06-14-23 @ 08:43)  Diet, NPO (06-14-23 @ 08:43)

## 2023-06-14 NOTE — BRIEF OPERATIVE NOTE - NSICDXBRIEFPROCEDURE_GEN_ALL_CORE_FT
Health Maintenance Due   Topic Date Due   • Varicella Vaccine (1 of 2 - 2-dose childhood series) Never done   • HPV Vaccine (1 - 2-dose series) Never done   • COVID-19 Vaccine (1) Never done   • DTaP/Tdap/Td Vaccine (1 - Tdap) Never done   • Cervical Cancer Screening  04/23/2021       Patient is due for topics listed above, she wishes to proceed with Cervical cancer screening, but is not proceeding with Immunization(s) COVID-19, Dtap/Tdap/Td, HPV and Varicella at this time.          PROCEDURES:  MVR (mitral valve replacement) 14-Jun-2023 11:50:07 31mm Rafal 2   Lupillo Hall

## 2023-06-14 NOTE — CONSULT NOTE ADULT - NS ATTEND AMEND GEN_ALL_CORE FT
Patient seen at bedside.  Status post mitral valve replacement.  History of hepatitis C.  He has been treated by Dr. Karlo Nelson.  Has followed up as outpatient.  As per the daughter there was no history of any cirrhosis and he had cleared the infection.  Once the patient is better, can follow-up with outpatient hepatology team.  No need for further inpatient evaluation.  Call GI as needed.

## 2023-06-14 NOTE — DIETITIAN INITIAL EVALUATION ADULT - NSFNSGIIOFT_GEN_A_CORE
06-14-23 @ 07:01  -  06-14-23 @ 13:05  --------------------------------------------------------  OUT:    Chest Tube (mL): 160 mL  Total OUT: 160 mL    Total NET: -160 mL

## 2023-06-14 NOTE — CONSULT NOTE ADULT - SUBJECTIVE AND OBJECTIVE BOX
Patient is a 68y old  Male who presents with a chief complaint of QUINCY (10 Kelvin 2023 10:23)      HPI:  68M, PMHx CAD s/p PCI of RCA 2021, HTN, HLD, DM, hyperthyroidism, hemochromatosis, neck surgery s/p PCDF & ACDF, appendectomy, hernia repair, R shoulder dislocation s/p repair, HCV s/p known MR initially planned to be evaluated for mitral clip, recent admission in April for syncope secondary to orthostatic hypotension and BELLO in setting of dehydration, found to have strep salivarius/vestilbularis bacteremia, QUINCY with severe MR, inconclusive for endocarditis, pt tx with 4 weeks ceftriaxone, now s/p repeat QUINCY to assess valve, found to have severe MR with multiple mobile echodensities.  pt sates he was originally told by his primary care doctor that he had a valve problem and was referred to Dr. Desouza for evaluation and was initially being considered for mitral clip. However, pt states he received a singles vaccine in April and he became very ill for several days including fever and general malaise. That was when he had an episode of syncope due to dehydration and was found to have the bacteremia. Now S/p MVR today. GI consulted for hx of Hepatitis C. Patient seen and evaluated at bedside, currently intubated. Information obtained from Family at bedside. Patient with hx of Hep C s/p completed treatment, Was seeing Dr Nelson as outpatient. As per daughter, denies cirrhosis, no GIB, no varices. Denies ETOH abuse.         PAST MEDICAL & SURGICAL HISTORY:  Lung nodule      Severe mitral regurgitation      Aortic stenosis      Chronic hepatitis C virus infection      Coronary artery disease of native artery of native heart with stable angina pectoris      Type 2 diabetes mellitus with other circulatory complication, with long-term current use of insulin      Primary hypertension      Mixed hyperlipidemia      Hemochromatosis      H/O neck surgery  PCDF ,  ACDF       S/P appendectomy  ,  right shoulder dislocation       H/O hernia repair        S/P spinal fusion      S/P shoulder surgery      Status post insertion of drug-eluting stent into right coronary artery for coronary artery disease      History of ERCP          Allergies    No Known Allergies    Intolerances        MEDICATIONS  (STANDING):  aspirin 325 milliGRAM(s) Oral daily  cefuroxime  IVPB 1500 milliGRAM(s) IV Intermittent every 8 hours  chlorhexidine 0.12% Liquid 15 milliLiter(s) Oral Mucosa every 12 hours  chlorhexidine 2% Cloths 1 Application(s) Topical daily  dextrose 50% Injectable 50 milliLiter(s) IV Push every 15 minutes  dextrose 50% Injectable 25 milliLiter(s) IV Push every 15 minutes  insulin regular Infusion 2 Unit(s)/Hr (2 mL/Hr) IV Continuous <Continuous>  norepinephrine Infusion 0.05 MICROgram(s)/kG/Min (7.28 mL/Hr) IV Continuous <Continuous>  polyethylene glycol 3350 17 Gram(s) Oral daily  potassium chloride  10 mEq/50 mL IVPB 10 milliEquivalent(s) IV Intermittent every 1 hour  potassium chloride  10 mEq/50 mL IVPB 10 milliEquivalent(s) IV Intermittent every 1 hour  potassium chloride  10 mEq/50 mL IVPB 10 milliEquivalent(s) IV Intermittent every 1 hour  potassium chloride  10 mEq/50 mL IVPB 10 milliEquivalent(s) IV Intermittent every 1 hour  propofol Infusion 5 MICROgram(s)/kG/Min (2.33 mL/Hr) IV Continuous <Continuous>  senna 2 Tablet(s) Oral at bedtime  sodium chloride 0.9%. 1000 milliLiter(s) (10 mL/Hr) IV Continuous <Continuous>  sodium chloride 0.9%. 1000 milliLiter(s) (5 mL/Hr) IV Continuous <Continuous>  vancomycin  IVPB 1000 milliGRAM(s) IV Intermittent every 12 hours    MEDICATIONS  (PRN):      Social History:    Marital Status:  (   )    (   ) Single    (   )    (  )   Occupation:   Lives with: (  ) alone  (  ) children   (  ) spouse   (  ) parents  (  ) other    Substance Use (street drugs): (  ) never used  (  ) other:  Tobacco Usage:  (   ) never smoked   (   ) former smoker   (   ) current smoker  (     ) pack year  (        ) last cigarette date  Alcohol Usage: Socially   Sexual History:     Family History   IBD (  ) Yes   (  ) No  GI Malignancy (  )  Yes    (  ) No    Health Management     Last Colonoscopy -      (     ) Advanced Directives: (     ) None    (      ) DNR    (     ) DNI    (     ) Health Care Proxy:       REVIEW OF SYSTEMS:   Unable to obtain. Pt intubated       Vital Signs Last 24 Hrs  T(C): 36.7 (2023 00:00), Max: 36.8 (2023 17:20)  T(F): 98 (2023 00:00), Max: 98.3 (2023 17:20)  HR: 83 (2023 13:00) (75 - 96)  BP: 124/76 (2023 06:00) (116/60 - 173/77)  BP(mean): 93 (2023 06:00) (83 - 110)  RR: 12 (2023 13:00) (10 - 24)  SpO2: 100% (2023 13:00) (96% - 100%)    Parameters below as of 2023 13:00  Patient On (Oxygen Delivery Method): ventilator    O2 Concentration (%): 100      PHYSICAL EXAM:   GENERAL:  Intubated   HEENT:  NC/AT, conjunctiva clear, sclera anicteric  CHEST:  No increased effort, breath sounds clear  HEART:  Regular rhythm  ABDOMEN:  Soft, non-tender, non-distended, normoactive bowel sounds  EXTREMITIES: No edema  SKIN:  Warm, dry  NEURO:  intubated           LABS:                        10.9   15.69 )-----------( 207      ( 2023 11:45 )             31.1     06-14    142  |  107  |  13.3  ----------------------------<  164<H>  3.6   |  22.0  |  0.65    Ca    9.5      2023 11:45  Mg     2.5     06-14    TPro  5.2<L>  /  Alb  3.2<L>  /  TBili  1.0  /  DBili  x   /  AST  40<H>  /  ALT  17  /  AlkPhos  61  06-14    PT/INR - ( 2023 11:45 )   PT: 17.0 sec;   INR: 1.46 ratio         PTT - ( 2023 11:45 )  PTT:30.8 sec  Urinalysis Basic - ( 2023 19:35 )    Color: Yellow / Appearance: Clear / S.010 / pH: x  Gluc: x / Ketone: Negative  / Bili: Negative / Urobili: Negative mg/dL   Blood: x / Protein: Negative / Nitrite: Negative   Leuk Esterase: Negative / RBC: 0-2 /HPF / WBC 0-2 /HPF   Sq Epi: x / Non Sq Epi: x / Bacteria: Occasional      LIVER FUNCTIONS - ( 2023 11:45 )  Alb: 3.2 g/dL / Pro: 5.2 g/dL / ALK PHOS: 61 U/L / ALT: 17 U/L / AST: 40 U/L / GGT: x                 RADIOLOGY & ADDITIONAL TESTS:

## 2023-06-14 NOTE — DIETITIAN INITIAL EVALUATION ADULT - OTHER INFO
68M, pmhx CAD s/p PCI of RCA 2/16/2021, HTN, HLD, DM, hyperthyroidism, hemochromatosis, neck surgery s/p PCDF & ACDF, appendectomy, hernia repair, R shoulder dislocation s/p repair, HCV s/p treatment, known MR initially planned to be evaluated for mitral clip, recent admission in April for syncope secondary to orthostatic hypotension and BELLO in setting of dehydration, found to have strep salivarius/vestilbularis bacteremia, QUINCY with severe MR, inconclusive for endocarditis, pt tx with 4 weeks ceftriaxone, now s/p repeat QUINCY 6/12 to assess valve, found to have severe MR with multiple mobile echodensities.   Pt is now s/p MVR; remains on vent support.

## 2023-06-14 NOTE — DIETITIAN INITIAL EVALUATION ADULT - PERTINENT MEDS FT
MEDICATIONS  (STANDING):  aspirin 325 milliGRAM(s) Oral daily  cefuroxime  IVPB 1500 milliGRAM(s) IV Intermittent every 8 hours  chlorhexidine 0.12% Liquid 15 milliLiter(s) Oral Mucosa every 12 hours  chlorhexidine 2% Cloths 1 Application(s) Topical daily  dextrose 50% Injectable 50 milliLiter(s) IV Push every 15 minutes  dextrose 50% Injectable 25 milliLiter(s) IV Push every 15 minutes  insulin regular Infusion 2 Unit(s)/Hr (2 mL/Hr) IV Continuous <Continuous>  norepinephrine Infusion 0.05 MICROgram(s)/kG/Min (7.28 mL/Hr) IV Continuous <Continuous>  polyethylene glycol 3350 17 Gram(s) Oral daily  potassium chloride  10 mEq/50 mL IVPB 10 milliEquivalent(s) IV Intermittent every 1 hour  potassium chloride  10 mEq/50 mL IVPB 10 milliEquivalent(s) IV Intermittent every 1 hour  potassium chloride  10 mEq/50 mL IVPB 10 milliEquivalent(s) IV Intermittent every 1 hour  potassium chloride  10 mEq/50 mL IVPB 10 milliEquivalent(s) IV Intermittent every 1 hour  propofol Infusion 5 MICROgram(s)/kG/Min (2.33 mL/Hr) IV Continuous <Continuous>  senna 2 Tablet(s) Oral at bedtime  sodium chloride 0.9%. 1000 milliLiter(s) (5 mL/Hr) IV Continuous <Continuous>  sodium chloride 0.9%. 1000 milliLiter(s) (10 mL/Hr) IV Continuous <Continuous>  vancomycin  IVPB 1000 milliGRAM(s) IV Intermittent every 12 hours    MEDICATIONS  (PRN):

## 2023-06-14 NOTE — CONSULT NOTE ADULT - ASSESSMENT
68M, pmhx CAD s/p PCI of RCA 2/16/2021, HTN, HLD, DM, hyperthyroidism, hemochromatosis, neck surgery s/p PCDF & ACDF, appendectomy, hernia repair, R shoulder dislocation s/p repair, HCV s/p treatment, known MR initially planned to be evaluated for mitral clip, recent admission in April for syncope secondary to orthostatic hypotension and BELLO in setting of dehydration, found to have strep salivarius/vestilbularis bacteremia, QUINCY with severe MR, inconclusive for endocarditis, pt tx with 4 weeks ceftriaxone, now s/p repeat QUINCY 6/12 to assess valve, found to have severe MR with multiple mobile echodensities. Now S/p MVR today. GI consulted for hx of Hepatitis C.

## 2023-06-14 NOTE — CONSULT NOTE ADULT - PROBLEM SELECTOR RECOMMENDATION 9
hx of Hep C s/p completed treatment, Was seeing Dr Nelson as outpatient. As per daughter, denies cirrhosis, no GIB, no varices. Denies ETOH abuse. Liver enzymes normal.     Plan:   Can consider RUQ US to assess Liver   No further inpatient workup needed at this time   Can F/U with Dr. Nelson's group when discharge or Hepatologist Dr. De La Torre for monitoring   Rest of care as per primary
s/p QUINCY  admit to Dr. Islas  plan for OR Wednesday for MV replacement  preop work up ordered (Labs, carotids, PFTs)  plan for cardiac cath in AM     d/w Dr. Islas

## 2023-06-14 NOTE — DIETITIAN INITIAL EVALUATION ADULT - PERTINENT LABORATORY DATA
06-14    142  |  107  |  13.3  ----------------------------<  164<H>  3.6   |  22.0  |  0.65    Ca    9.5      14 Jun 2023 11:45  Mg     2.5     06-14    TPro  5.2<L>  /  Alb  3.2<L>  /  TBili  1.0  /  DBili  x   /  AST  40<H>  /  ALT  17  /  AlkPhos  61  06-14  POCT Blood Glucose.: 142 mg/dL (06-14-23 @ 12:58)  A1C with Estimated Average Glucose Result: 6.3 % (06-12-23 @ 16:00)

## 2023-06-15 DIAGNOSIS — E11.59 TYPE 2 DIABETES MELLITUS WITH OTHER CIRCULATORY COMPLICATIONS: ICD-10-CM

## 2023-06-15 DIAGNOSIS — Z29.9 ENCOUNTER FOR PROPHYLACTIC MEASURES, UNSPECIFIED: ICD-10-CM

## 2023-06-15 DIAGNOSIS — E78.2 MIXED HYPERLIPIDEMIA: ICD-10-CM

## 2023-06-15 LAB
ALBUMIN SERPL ELPH-MCNC: 3.4 G/DL — SIGNIFICANT CHANGE UP (ref 3.3–5.2)
ALP SERPL-CCNC: 59 U/L — SIGNIFICANT CHANGE UP (ref 40–120)
ALT FLD-CCNC: 18 U/L — SIGNIFICANT CHANGE UP
ANION GAP SERPL CALC-SCNC: 12 MMOL/L — SIGNIFICANT CHANGE UP (ref 5–17)
AST SERPL-CCNC: 56 U/L — HIGH
BILIRUB DIRECT SERPL-MCNC: 0.4 MG/DL — HIGH (ref 0–0.3)
BILIRUB INDIRECT FLD-MCNC: 1.2 MG/DL — HIGH (ref 0.2–1)
BILIRUB SERPL-MCNC: 1.6 MG/DL — SIGNIFICANT CHANGE UP (ref 0.4–2)
BUN SERPL-MCNC: 17 MG/DL — SIGNIFICANT CHANGE UP (ref 8–20)
CALCIUM SERPL-MCNC: 8.5 MG/DL — SIGNIFICANT CHANGE UP (ref 8.4–10.5)
CHLORIDE SERPL-SCNC: 107 MMOL/L — SIGNIFICANT CHANGE UP (ref 96–108)
CK MB CFR SERPL CALC: 22.8 NG/ML — HIGH (ref 0–6.7)
CK SERPL-CCNC: 506 U/L — HIGH (ref 30–200)
CO2 SERPL-SCNC: 21 MMOL/L — LOW (ref 22–29)
CREAT SERPL-MCNC: 0.73 MG/DL — SIGNIFICANT CHANGE UP (ref 0.5–1.3)
EGFR: 99 ML/MIN/1.73M2 — SIGNIFICANT CHANGE UP
GLUCOSE BLDC GLUCOMTR-MCNC: 109 MG/DL — HIGH (ref 70–99)
GLUCOSE BLDC GLUCOMTR-MCNC: 124 MG/DL — HIGH (ref 70–99)
GLUCOSE BLDC GLUCOMTR-MCNC: 132 MG/DL — HIGH (ref 70–99)
GLUCOSE BLDC GLUCOMTR-MCNC: 136 MG/DL — HIGH (ref 70–99)
GLUCOSE BLDC GLUCOMTR-MCNC: 138 MG/DL — HIGH (ref 70–99)
GLUCOSE BLDC GLUCOMTR-MCNC: 150 MG/DL — HIGH (ref 70–99)
GLUCOSE BLDC GLUCOMTR-MCNC: 151 MG/DL — HIGH (ref 70–99)
GLUCOSE BLDC GLUCOMTR-MCNC: 154 MG/DL — HIGH (ref 70–99)
GLUCOSE BLDC GLUCOMTR-MCNC: 202 MG/DL — HIGH (ref 70–99)
GLUCOSE BLDC GLUCOMTR-MCNC: 216 MG/DL — HIGH (ref 70–99)
GLUCOSE BLDC GLUCOMTR-MCNC: 222 MG/DL — HIGH (ref 70–99)
GLUCOSE BLDC GLUCOMTR-MCNC: 257 MG/DL — HIGH (ref 70–99)
GLUCOSE BLDC GLUCOMTR-MCNC: 268 MG/DL — HIGH (ref 70–99)
GLUCOSE BLDC GLUCOMTR-MCNC: 280 MG/DL — HIGH (ref 70–99)
GLUCOSE BLDC GLUCOMTR-MCNC: 76 MG/DL — SIGNIFICANT CHANGE UP (ref 70–99)
GLUCOSE BLDC GLUCOMTR-MCNC: 83 MG/DL — SIGNIFICANT CHANGE UP (ref 70–99)
GLUCOSE BLDC GLUCOMTR-MCNC: 88 MG/DL — SIGNIFICANT CHANGE UP (ref 70–99)
GLUCOSE BLDC GLUCOMTR-MCNC: 96 MG/DL — SIGNIFICANT CHANGE UP (ref 70–99)
GLUCOSE BLDC GLUCOMTR-MCNC: 96 MG/DL — SIGNIFICANT CHANGE UP (ref 70–99)
GLUCOSE SERPL-MCNC: 115 MG/DL — HIGH (ref 70–99)
HCT VFR BLD CALC: 30.3 % — LOW (ref 39–50)
HGB BLD-MCNC: 10.4 G/DL — LOW (ref 13–17)
INR BLD: 1.39 RATIO — HIGH (ref 0.88–1.16)
LACTATE SERPL-SCNC: 0.6 MMOL/L — SIGNIFICANT CHANGE UP (ref 0.5–2)
MAGNESIUM SERPL-MCNC: 2.3 MG/DL — SIGNIFICANT CHANGE UP (ref 1.6–2.6)
MCHC RBC-ENTMCNC: 30.2 PG — SIGNIFICANT CHANGE UP (ref 27–34)
MCHC RBC-ENTMCNC: 34.3 GM/DL — SIGNIFICANT CHANGE UP (ref 32–36)
MCV RBC AUTO: 88.1 FL — SIGNIFICANT CHANGE UP (ref 80–100)
PLATELET # BLD AUTO: 196 K/UL — SIGNIFICANT CHANGE UP (ref 150–400)
POTASSIUM SERPL-MCNC: 4 MMOL/L — SIGNIFICANT CHANGE UP (ref 3.5–5.3)
POTASSIUM SERPL-SCNC: 4 MMOL/L — SIGNIFICANT CHANGE UP (ref 3.5–5.3)
PROT SERPL-MCNC: 5.6 G/DL — LOW (ref 6.6–8.7)
PROTHROM AB SERPL-ACNC: 16.2 SEC — HIGH (ref 10.5–13.4)
RBC # BLD: 3.44 M/UL — LOW (ref 4.2–5.8)
RBC # FLD: 13.8 % — SIGNIFICANT CHANGE UP (ref 10.3–14.5)
SODIUM SERPL-SCNC: 140 MMOL/L — SIGNIFICANT CHANGE UP (ref 135–145)
TROPONIN T SERPL-MCNC: 0.65 NG/ML — HIGH (ref 0–0.06)
WBC # BLD: 9.1 K/UL — SIGNIFICANT CHANGE UP (ref 3.8–10.5)
WBC # FLD AUTO: 9.1 K/UL — SIGNIFICANT CHANGE UP (ref 3.8–10.5)

## 2023-06-15 PROCEDURE — 99024 POSTOP FOLLOW-UP VISIT: CPT

## 2023-06-15 PROCEDURE — 99223 1ST HOSP IP/OBS HIGH 75: CPT

## 2023-06-15 PROCEDURE — 71045 X-RAY EXAM CHEST 1 VIEW: CPT | Mod: 26

## 2023-06-15 PROCEDURE — 99291 CRITICAL CARE FIRST HOUR: CPT

## 2023-06-15 PROCEDURE — 93010 ELECTROCARDIOGRAM REPORT: CPT

## 2023-06-15 RX ORDER — SODIUM CHLORIDE 9 MG/ML
1000 INJECTION, SOLUTION INTRAVENOUS
Refills: 0 | Status: DISCONTINUED | OUTPATIENT
Start: 2023-06-15 | End: 2023-06-16

## 2023-06-15 RX ORDER — WARFARIN SODIUM 2.5 MG/1
5 TABLET ORAL ONCE
Refills: 0 | Status: COMPLETED | OUTPATIENT
Start: 2023-06-15 | End: 2023-06-15

## 2023-06-15 RX ORDER — ENOXAPARIN SODIUM 100 MG/ML
40 INJECTION SUBCUTANEOUS EVERY 24 HOURS
Refills: 0 | Status: DISCONTINUED | OUTPATIENT
Start: 2023-06-15 | End: 2023-06-16

## 2023-06-15 RX ORDER — DEXTROSE 50 % IN WATER 50 %
15 SYRINGE (ML) INTRAVENOUS ONCE
Refills: 0 | Status: DISCONTINUED | OUTPATIENT
Start: 2023-06-15 | End: 2023-06-21

## 2023-06-15 RX ORDER — ALBUMIN HUMAN 25 %
250 VIAL (ML) INTRAVENOUS
Refills: 0 | Status: COMPLETED | OUTPATIENT
Start: 2023-06-15 | End: 2023-06-15

## 2023-06-15 RX ORDER — PSYLLIUM SEED (WITH DEXTROSE)
1 POWDER (GRAM) ORAL
Refills: 0 | Status: DISCONTINUED | OUTPATIENT
Start: 2023-06-15 | End: 2023-06-21

## 2023-06-15 RX ORDER — ASPIRIN/CALCIUM CARB/MAGNESIUM 324 MG
325 TABLET ORAL DAILY
Refills: 0 | Status: DISCONTINUED | OUTPATIENT
Start: 2023-06-16 | End: 2023-06-16

## 2023-06-15 RX ORDER — INSULIN LISPRO 100/ML
5 VIAL (ML) SUBCUTANEOUS
Refills: 0 | Status: DISCONTINUED | OUTPATIENT
Start: 2023-06-15 | End: 2023-06-21

## 2023-06-15 RX ORDER — DEXTROSE 50 % IN WATER 50 %
25 SYRINGE (ML) INTRAVENOUS ONCE
Refills: 0 | Status: DISCONTINUED | OUTPATIENT
Start: 2023-06-15 | End: 2023-06-16

## 2023-06-15 RX ORDER — WARFARIN SODIUM 2.5 MG/1
5 TABLET ORAL ONCE
Refills: 0 | Status: DISCONTINUED | OUTPATIENT
Start: 2023-06-15 | End: 2023-06-15

## 2023-06-15 RX ORDER — METOPROLOL TARTRATE 50 MG
12.5 TABLET ORAL
Refills: 0 | Status: DISCONTINUED | OUTPATIENT
Start: 2023-06-15 | End: 2023-06-16

## 2023-06-15 RX ORDER — ACETAMINOPHEN 500 MG
1000 TABLET ORAL ONCE
Refills: 0 | Status: COMPLETED | OUTPATIENT
Start: 2023-06-15 | End: 2023-06-15

## 2023-06-15 RX ORDER — ATORVASTATIN CALCIUM 80 MG/1
80 TABLET, FILM COATED ORAL AT BEDTIME
Refills: 0 | Status: DISCONTINUED | OUTPATIENT
Start: 2023-06-15 | End: 2023-06-21

## 2023-06-15 RX ORDER — OXYCODONE HYDROCHLORIDE 5 MG/1
5 TABLET ORAL EVERY 4 HOURS
Refills: 0 | Status: DISCONTINUED | OUTPATIENT
Start: 2023-06-15 | End: 2023-06-21

## 2023-06-15 RX ORDER — WARFARIN SODIUM 2.5 MG/1
2.5 TABLET ORAL ONCE
Refills: 0 | Status: DISCONTINUED | OUTPATIENT
Start: 2023-06-15 | End: 2023-06-15

## 2023-06-15 RX ORDER — ACETAMINOPHEN 500 MG
975 TABLET ORAL EVERY 6 HOURS
Refills: 0 | Status: COMPLETED | OUTPATIENT
Start: 2023-06-16 | End: 2023-06-19

## 2023-06-15 RX ORDER — INSULIN GLARGINE 100 [IU]/ML
15 INJECTION, SOLUTION SUBCUTANEOUS AT BEDTIME
Refills: 0 | Status: DISCONTINUED | OUTPATIENT
Start: 2023-06-15 | End: 2023-06-21

## 2023-06-15 RX ORDER — INSULIN LISPRO 100/ML
VIAL (ML) SUBCUTANEOUS
Refills: 0 | Status: DISCONTINUED | OUTPATIENT
Start: 2023-06-16 | End: 2023-06-21

## 2023-06-15 RX ORDER — GLUCAGON INJECTION, SOLUTION 0.5 MG/.1ML
1 INJECTION, SOLUTION SUBCUTANEOUS ONCE
Refills: 0 | Status: DISCONTINUED | OUTPATIENT
Start: 2023-06-15 | End: 2023-06-21

## 2023-06-15 RX ORDER — DEXTROSE 50 % IN WATER 50 %
12.5 SYRINGE (ML) INTRAVENOUS ONCE
Refills: 0 | Status: DISCONTINUED | OUTPATIENT
Start: 2023-06-15 | End: 2023-06-16

## 2023-06-15 RX ADMIN — Medication 15 MILLIGRAM(S): at 13:00

## 2023-06-15 RX ADMIN — Medication 250 MILLIGRAM(S): at 21:06

## 2023-06-15 RX ADMIN — Medication 100 MILLIGRAM(S): at 16:57

## 2023-06-15 RX ADMIN — Medication 100 MILLIGRAM(S): at 09:29

## 2023-06-15 RX ADMIN — OXYCODONE HYDROCHLORIDE 5 MILLIGRAM(S): 5 TABLET ORAL at 16:55

## 2023-06-15 RX ADMIN — SENNA PLUS 2 TABLET(S): 8.6 TABLET ORAL at 21:06

## 2023-06-15 RX ADMIN — Medication 400 MILLIGRAM(S): at 04:30

## 2023-06-15 RX ADMIN — Medication 125 MILLILITER(S): at 23:43

## 2023-06-15 RX ADMIN — Medication 250 MILLIGRAM(S): at 09:29

## 2023-06-15 RX ADMIN — Medication 15 MILLIGRAM(S): at 05:35

## 2023-06-15 RX ADMIN — ENOXAPARIN SODIUM 40 MILLIGRAM(S): 100 INJECTION SUBCUTANEOUS at 06:47

## 2023-06-15 RX ADMIN — CHLORHEXIDINE GLUCONATE 1 APPLICATION(S): 213 SOLUTION TOPICAL at 17:00

## 2023-06-15 RX ADMIN — Medication 15 MILLIGRAM(S): at 21:06

## 2023-06-15 RX ADMIN — Medication 1000 MILLIGRAM(S): at 19:45

## 2023-06-15 RX ADMIN — Medication 100 MILLIGRAM(S): at 23:44

## 2023-06-15 RX ADMIN — Medication 15 MILLIGRAM(S): at 21:21

## 2023-06-15 RX ADMIN — Medication 1000 MILLIGRAM(S): at 04:45

## 2023-06-15 RX ADMIN — Medication 12.5 MILLIGRAM(S): at 06:47

## 2023-06-15 RX ADMIN — OXYCODONE HYDROCHLORIDE 5 MILLIGRAM(S): 5 TABLET ORAL at 11:10

## 2023-06-15 RX ADMIN — PANTOPRAZOLE SODIUM 40 MILLIGRAM(S): 20 TABLET, DELAYED RELEASE ORAL at 11:13

## 2023-06-15 RX ADMIN — Medication 15 MILLIGRAM(S): at 06:00

## 2023-06-15 RX ADMIN — Medication 15 MILLIGRAM(S): at 13:15

## 2023-06-15 RX ADMIN — Medication 125 MILLILITER(S): at 23:00

## 2023-06-15 RX ADMIN — INSULIN HUMAN 2 UNIT(S)/HR: 100 INJECTION, SOLUTION SUBCUTANEOUS at 11:14

## 2023-06-15 RX ADMIN — ATORVASTATIN CALCIUM 80 MILLIGRAM(S): 80 TABLET, FILM COATED ORAL at 21:07

## 2023-06-15 RX ADMIN — Medication 325 MILLIGRAM(S): at 11:13

## 2023-06-15 RX ADMIN — Medication 400 MILLIGRAM(S): at 19:30

## 2023-06-15 RX ADMIN — Medication 12.5 MILLIGRAM(S): at 17:01

## 2023-06-15 RX ADMIN — OXYCODONE HYDROCHLORIDE 5 MILLIGRAM(S): 5 TABLET ORAL at 11:40

## 2023-06-15 RX ADMIN — POLYETHYLENE GLYCOL 3350 17 GRAM(S): 17 POWDER, FOR SOLUTION ORAL at 11:13

## 2023-06-15 RX ADMIN — WARFARIN SODIUM 5 MILLIGRAM(S): 2.5 TABLET ORAL at 21:06

## 2023-06-15 RX ADMIN — Medication 100 MILLIGRAM(S): at 00:07

## 2023-06-15 RX ADMIN — OXYCODONE HYDROCHLORIDE 5 MILLIGRAM(S): 5 TABLET ORAL at 17:25

## 2023-06-15 NOTE — PROGRESS NOTE ADULT - SUBJECTIVE AND OBJECTIVE BOX
Subjective - patient seen and evaluated bedside. Sitting comfortably in bed. Denies CP, SOB, HA, dizziness, n/v/d    Review of Systems: negative x 10 systems except as noted above    Brief summary:  68yMale POD# 1 MVR    Significant/[id number] events: s/p MVR      PAST MEDICAL & SURGICAL HISTORY:  Lung nodule      Severe mitral regurgitation      Aortic stenosis      Chronic hepatitis C virus infection      Coronary artery disease of native artery of native heart with stable angina pectoris      Type 2 diabetes mellitus with other circulatory complication, with long-term current use of insulin      Primary hypertension      Mixed hyperlipidemia      Hemochromatosis      H/O neck surgery  PCDF ,  ACDF       S/P appendectomy  ,  right shoulder dislocation       H/O hernia repair        S/P spinal fusion      S/P shoulder surgery      Status post insertion of drug-eluting stent into right coronary artery for coronary artery disease      History of ERCP            aspirin 325 milliGRAM(s) Oral daily  cefuroxime  IVPB 1500 milliGRAM(s) IV Intermittent every 8 hours  chlorhexidine 2% Cloths 1 Application(s) Topical daily  dextrose 50% Injectable 50 milliLiter(s) IV Push every 15 minutes  dextrose 50% Injectable 25 milliLiter(s) IV Push every 15 minutes  insulin regular Infusion 2 Unit(s)/Hr IV Continuous <Continuous>  ketorolac   Injectable 15 milliGRAM(s) IV Push every 8 hours  norepinephrine Infusion 0.05 MICROgram(s)/kG/Min IV Continuous <Continuous>  pantoprazole    Tablet 40 milliGRAM(s) Oral daily  polyethylene glycol 3350 17 Gram(s) Oral daily  senna 2 Tablet(s) Oral at bedtime  sodium chloride 0.9%. 1000 milliLiter(s) IV Continuous <Continuous>  sodium chloride 0.9%. 1000 milliLiter(s) IV Continuous <Continuous>  vancomycin  IVPB 1000 milliGRAM(s) IV Intermittent every 12 hours  MEDICATIONS  (PRN):    Mode: CPAP with PS, FiO2: 40, PEEP: 5, PS: 5, MAP: 7  Daily     Daily Weight in k.4 (2023 04:30)      ABG - ( 2023 16:26 )  pH, Arterial: 7.490 pH, Blood: x     /  pCO2: 33    /  pO2: 172   / HCO3: 25    / Base Excess: 1.8   /  SaO2: 100.0                                   10.9   15.69 )-----------( 207      ( 2023 11:45 )             31.1   14    142  |  110<H>  |  14.1  ----------------------------<  153<H>  3.9   |  23.0  |  0.65    Ca    9.1      2023 15:15  Mg     2.5     14    TPro  5.2<L>  /  Alb  3.2<L>  /  TBili  1.0  /  DBili  x   /  AST  40<H>  /  ALT  17  /  AlkPhos  61  -14    CARDIAC MARKERS ( 2023 11:45 )  x     / 0.62 ng/mL / 322 U/L / x     / 37.0 ng/mL    PT/INR - ( 2023 11:45 )   PT: 17.0 sec;   INR: 1.46 ratio         PTT - ( 2023 11:45 )  PTT:30.8 sec      Objective:  T(C): 37.3 (06-15-23 @ 01:00), Max: 37.5 (23 @ 14:00)  HR: 72 (06-15-23 @ 01:00) (72 - 87)  BP: 111/63 (06-15-23 @ 01:00) (96/60 - 143/75)  RR: 18 (06-15-23 @ 01:00) (11 - 26)  SpO2: 100% (06-15-23 @ 01:00) (96% - 100%)  Wt(kg): --CAPILLARY BLOOD GLUCOSE      POCT Blood Glucose.: 96 mg/dL (15 Kelvin 2023 00:29)  POCT Blood Glucose.: 96 mg/dL (15 Kelvin 2023 00:10)  POCT Blood Glucose.: 100 mg/dL (2023 22:04)  POCT Blood Glucose.: 106 mg/dL (2023 20:03)  POCT Blood Glucose.: 125 mg/dL (2023 18:03)  POCT Blood Glucose.: 141 mg/dL (2023 15:58)  POCT Blood Glucose.: 149 mg/dL (2023 15:00)  POCT Blood Glucose.: 135 mg/dL (2023 13:55)  POCT Blood Glucose.: 142 mg/dL (2023 12:58)  POCT Blood Glucose.: 157 mg/dL (2023 06:59)  I&O's Summary    2023 07:01  -  2023 07:00  --------------------------------------------------------  IN: 240 mL / OUT: 300 mL / NET: -60 mL    2023 07:01  -  15 Kelvin 2023 01:17  --------------------------------------------------------  IN: 1270.9 mL / OUT: 1525 mL / NET: -254.1 mL        Physical Exam  General: NAD  Neuro: A+O x 3, non-focal, speech clear and intact  Psych: Appropriate affect  HEENT:  NCAT, No conjuctival edema or icterus, no thrush.  Pulm: CTA, equal bilaterally  CV: RRR,  +S1S2  Abd: soft, NT, ND, +BS  Ext: +DP Pulses b/l, no edema  Skin: Warm, dry, intact  Inc: MSI C/D/I/stable w/ dressing,   Chest tubes: mediastinal CT to Sxn, draining appropriately, no AL         Imaging:      < from: Xray Chest 1 View- PORTABLE-Urgent (Xray Chest 1 View- PORTABLE-Urgent .) (23 @ 15:27) >  FINDINGS:    Single frontal view of the chest demonstrates the lungs to be clear. The   cardiomediastinal silhouette is normal. No acute osseous abnormalities.   Overlying EKG leads and wires are noted. Cervical spinal fusion hardware   is noted.    IMPRESSION: No acute cardiopulmonary disease process.    --- End of Report ---    < end of copied text >

## 2023-06-15 NOTE — PROGRESS NOTE ADULT - SUBJECTIVE AND OBJECTIVE BOX
LYDIA PEACE  MRN-[id number]    HPI:  69 yo male with h/o CAD s/p PCI of RCA 2021, HTN, HLD, DM, HCV s/p treatment and cardiac murmur with MR. He saw Dr. Desouza for evaluation of moderate to severe MR and was referred to Dr. Islas for mitraClip evaluation. In [place] had a [place]opal episode [place] [facility] [place] with [place] and endocarditis and completed 4 weeks of antibiotics as of . He is being referred for QUINCY to evaluated mitral valve post endocarditis and if he is still a candidate for MitralClip. He does not report any SOB at this time but has not increased activity. + GUERIN and fatigue.  (10 Kelvin 2023 10:23)      Surgery/Hospital Course:  ·  PRE-OP DIAGNOSIS:  Mitral valve insufficiency  ·  POST-OP DIAGNOSIS:  Mitral valve insufficiency   ·  PROCEDURES:  MVR (mitral valve replacement) 2023   31mm Lyles 2    Today:  No acute events   chronic back pain  SR , RA  Endo consult  Lopressor 12.5 mg        ICU Vital Signs Last 24 Hrs  T(C): 37.7 (15 Kelvin 2023 07:00), Max: 37.7 (15 Kelvin 2023 07:00)  T(F): 99.9 (15 Kelvin 2023 07:00), Max: 99.9 (15 Kelvin 2023 07:00)  HR: 73 (15 Kelvin 2023 12:00) (72 - 84)  BP: 113/65 (15 Kelvin 2023 09:00) (96/60 - 131/62)  BP(mean): 85 (15 Kelvin 2023 09:00) (74 - 93)  ABP: 133/51 (15 Kelvin 2023 12:00) (99/47 - 141/60)  ABP(mean): 77 (15 Kelvin 2023 12:00) (65 - 107)  RR: 27 (15 Kelvin 2023 12:00) (12 - 27)  SpO2: 97% (15 Kelvin 2023 12:00) (95% - 100%)    O2 Parameters below as of 15 Kelvin 2023 08:00  Patient On (Oxygen Delivery Method): room air            Physical Exam:  Gen: A&O   CNS: non focal 	  Neck: no JVD  RES : clear , no wheezing              CVS: Regular  rhythm. Normal S1/S2  Abd: Soft, non-distended. Bowel sounds present.  Skin: No rash.  Ext:  no edema    ============================I/O===========================   I&O's Detail    2023 07:01  -  15 Kelvin 2023 07:00  --------------------------------------------------------  IN:    Insulin: 28.5 mL    IV PiggyBack: 300 mL    IV PiggyBack: 100 mL    IV PiggyBack: 250 mL    IV PiggyBack: 100 mL    Oral Fluid: 240 mL    Propofol: 30.4 mL    sodium chloride 0.9%: 200 mL    sodium chloride 0.9%: 100 mL  Total IN: 1348.9 mL    OUT:    Chest Tube (mL): 700 mL    Indwelling Catheter - Urethral (mL): 1330 mL    Norepinephrine: 0 mL  Total OUT: 2030 mL    Total NET: -681.1 mL      15 Kelvin 2023 07:01  -  15 Kelvin 2023 15:43  --------------------------------------------------------  IN:    Insulin: 15 mL    sodium chloride 0.9%: 50 mL    sodium chloride 0.9%: 25 mL  Total IN: 90 mL    OUT:    Chest Tube (mL): 50 mL    Indwelling Catheter - Urethral (mL): 250 mL  Total OUT: 300 mL    Total NET: -210 mL        ============================ LABS =========================                        10.4   9.10  )-----------( 196      ( 15 Kelvin 2023 02:05 )             30.3     06-15    140  |  107  |  17.0  ----------------------------<  115<H>  4.0   |  21.0<L>  |  0.73    Ca    8.5      15 Kelvin 2023 02:05  Mg     2.3     06-15    TPro  5.6<L>  /  Alb  3.4  /  TBili  1.6  /  DBili  0.4<H>  /  AST  56<H>  /  ALT  18  /  AlkPhos  59  06-15    LIVER FUNCTIONS - ( 15 Kelvin 2023 02:05 )  Alb: 3.4 g/dL / Pro: 5.6 g/dL / ALK PHOS: 59 U/L / ALT: 18 U/L / AST: 56 U/L / GGT: x           PT/INR - ( 15 Kelvin 2023 02:05 )   PT: 16.2 sec;   INR: 1.39 ratio         PTT - ( 2023 11:45 )  PTT:30.8 sec  ABG - ( 2023 16:26 )  pH, Arterial: 7.490 pH, Blood: x     /  pCO2: 33    /  pO2: 172   / HCO3: 25    / Base Excess: 1.8   /  SaO2: 100.0             Urinalysis Basic - ( 2023 19:35 )    Color: Yellow / Appearance: Clear / S.010 / pH: x  Gluc: x / Ketone: Negative  / Bili: Negative / Urobili: Negative mg/dL   Blood: x / Protein: Negative / Nitrite: Negative   Leuk Esterase: Negative / RBC: 0-2 /HPF / WBC 0-2 /HPF   Sq Epi: x / Non Sq Epi: x / Bacteria: Occasional      ======================Micro/Rad/Cardio=================  Culture: Reviewed   CXR: Reviewed  Echo:Reviewed  ======================================================  PAST MEDICAL & SURGICAL HISTORY:  Lung nodule      Severe mitral regurgitation      Aortic stenosis      Chronic hepatitis C virus infection      Coronary artery disease of native artery of native heart with stable angina pectoris      Type 2 diabetes mellitus with other circulatory complication, with long-term current use of insulin      Primary hypertension      Mixed hyperlipidemia      Hemochromatosis      H/O neck surgery  PCDF ,  ACDF       S/P appendectomy  ,  right shoulder dislocation       H/O hernia repair        S/P spinal fusion      S/P shoulder surgery      Status post insertion of drug-eluting stent into right coronary artery for coronary artery disease      History of ERCP        ====================ASSESSMENT ==============  68M, pmhx CAD s/p PCI of RCA 2021, HTN, HLD, DM, hyperthyroidism, hemochromatosis, neck surgery s/p PCDF & ACDF, appendectomy, hernia repair, R shoulder dislocation s/p repair, HCV s/p treatment, known MR initially planned to be evaluated for mitral clip, recent admission in April for syncope secondary to orthostatic hypotension and BELLO in setting of dehydration, found to have strep salivarius/vestilbularis bacteremia, QUINCY with severe MR, inconclusive for endocarditis, pt tx with 4 weeks ceftriaxone, now s/p repeat QUINCY  to assess valve, found to have severe MR with multiple mobile echodensities.  underwent MVR with Dr. Islas      --- Severe mitral regurgitation.   ---s/o MVR  --- Type 2 diabetes mellitus with other circulatory complication, with long-term current use of insulin.   --- Mixed hyperlipidemia.   ---HCV (hepatitis C virus).   ---Post op Hypovolemia  ---Post op respiratory insufficiency       Plan:  -Beta blocker as tolerated by HR and SBPhylaxis  -Aspirin for valve prophylaxis  -Chest PT and IS use with bedside nurse   -Continue insulin gtt  - Continue high dose statin (continuing home dose atorvastatin 80mg).  -GI consult appreciated.  -Lovenox and SCDs for DVT prophylaxis  -Protonix for GI prophylaxis      ====================== NEUROLOGY=====================  aspirin 325 milliGRAM(s) Oral daily  ketorolac   Injectable 15 milliGRAM(s) IV Push every 8 hours  oxyCODONE    IR 5 milliGRAM(s) Oral every 4 hours PRN Moderate Pain (4 - 6)    ==================== RESPIRATORY======================  Post op respiratory insufficiency  ====================CARDIOVASCULAR==================  Post op Hypovolemia  metoprolol tartrate 12.5 milliGRAM(s) Oral two times a day    ===================HEMATOLOGIC/ONC ===================  Monitor H&H/Plts    enoxaparin Injectable 40 milliGRAM(s) SubCutaneous every 24 hours  warfarin 5 milliGRAM(s) Oral once    ===================== RENAL =========================  Continue monitoring urine output, I&OS, BUN/Cr     ==================== GASTROINTESTINAL===================  dextrose 5%. 1000 milliLiter(s) (100 mL/Hr) IV Continuous <Continuous>  dextrose 5%. 1000 milliLiter(s) (50 mL/Hr) IV Continuous <Continuous>  pantoprazole    Tablet 40 milliGRAM(s) Oral daily  polyethylene glycol 3350 17 Gram(s) Oral daily  senna 2 Tablet(s) Oral at bedtime  sodium chloride 0.9%. 1000 milliLiter(s) (5 mL/Hr) IV Continuous <Continuous>  sodium chloride 0.9%. 1000 milliLiter(s) (10 mL/Hr) IV Continuous <Continuous>    =======================    ENDOCRINE  =====================  atorvastatin 80 milliGRAM(s) Oral at bedtime  dextrose 50% Injectable 25 Gram(s) IV Push once  dextrose 50% Injectable 25 Gram(s) IV Push once  dextrose 50% Injectable 25 milliLiter(s) IV Push every 15 minutes  dextrose 50% Injectable 12.5 Gram(s) IV Push once  dextrose 50% Injectable 50 milliLiter(s) IV Push every 15 minutes  dextrose Oral Gel 15 Gram(s) Oral once PRN Blood Glucose LESS THAN 70 milliGRAM(s)/deciliter  glucagon  Injectable 1 milliGRAM(s) IntraMuscular once  insulin lispro Injectable (ADMELOG) 5 Unit(s) SubCutaneous three times a day before meals  insulin regular Infusion 2 Unit(s)/Hr (2 mL/Hr) IV Continuous <Continuous>    ========================INFECTIOUS DISEASE================  cefuroxime  IVPB 1500 milliGRAM(s) IV Intermittent every 8 hours  vancomycin  IVPB 1000 milliGRAM(s) IV Intermittent every 12 hours      -Monitor Neurologic status ,   -Head of the bed should remain elevated to 45 degrees,  -Monitor for arrhythmias and monitor parameters for organ perfusion,  -Glycemic control is satisfactory,  -Nutritional goals will be met using po eventually , insure adequate caloric intake and monitor the same ,  -Electrolytes have been repleted as necessary , pain control has been achieved  and wound care has been carried out ,  -Stress ulcer and VTE prophylaxis will be achieved,  -Agressive PT and early mobility and ambulation goals will be met,  -The family was updated about the course and plan .      I have spent 35 minutes providing acute care for this critically ill patient     Patient requires continuous monitoring with bedside rhythm monitoring, pulse ox monitoring, and intermittent blood gas analysis. Care plan discussed with ICU care team. Patient remained critical and at risk for life threatening decompensation.

## 2023-06-15 NOTE — PROGRESS NOTE ADULT - PROBLEM SELECTOR PLAN 1
s/o MVR  Pressors weaned off  Beta blocker as tolerated by HR and SBPhylaxis  Aspirin for valve prophylaxis  Encourage PO intake  Encourage OOB to chair and ambulation with PT  Encourage deep breathing exercised and coughing  Chest PT and IS use with bedside nurse     Plan to be discussed with CT Surgery attendings during AM rounds.

## 2023-06-15 NOTE — CONSULT NOTE ADULT - SUBJECTIVE AND OBJECTIVE BOX
Patient is a 68y old  Male who presents with a chief complaint of Nonrheumatic mitral valve regurgitation     (14 Jun 2023 13:05)    HPI:  67 yo male with h/o CAD s/p PCI of RCA 2/16/2021, HTN, HLD, DM, HCV s/p treatment and cardiac murmur with MR. He saw Dr. Desouza for evaluation of moderate to severe MR and was referred to Dr. Islas for mitraClip evaluation.   In April he had a syncopal episode and went to [facility] [place] was admitted with syncope and endocarditis and completed 4 weeks of antibiotics as of April 12.   He is being referred for QUINCY to evaluated mitral valve post endocarditis and if he is still a candidate for MitralClip. He does not report any SOB at this time but has not increased activity. + GUERIN and fatigue.   6/14 underwent MVR with Dr. Islas.  On admission a 1c 6.3%, at home on insulin, trulicity.            PAST MEDICAL & SURGICAL HISTORY:  Lung nodule    Severe mitral regurgitation    Aortic stenosis    Chronic hepatitis C virus infection    Coronary artery disease of native artery of native heart with stable angina pectoris    Type 2 diabetes mellitus with other circulatory complication, with long-term current use of insulin    Primary hypertension    Mixed hyperlipidemia    Hemochromatosis    H/O neck surgery  PCDF 1992,  ACDF 1993    S/P appendectomy  1974,  right shoulder dislocation 1962    H/O hernia repair  1985    S/P spinal fusion    S/P shoulder surgery    Status post insertion of drug-eluting stent into right coronary artery for coronary artery disease    History of ERCP        Social History:        Marital: , lives with wife       Tobacco: Former 0.5 ppd smoker for 10 years, quit 30 years ago.       ETOH: Denies       Drugs: Denies       Caffeine: 1 cup of coffee daily      FAMILY HISTORY:  Family history of diabetes mellitus    Family history of diabetes mellitus          Allergies    No Known Allergies            REVIEW OF SYSTEMS:    CONSTITUTIONAL: No fever, weight loss, + fatigue  EYES: No eye pain, visual disturbances, or discharge  ENMT:  No difficulty hearing, tinnitus, vertigo; No sinus or throat pain  NECK: No pain or stiffness  RESPIRATORY: No cough, wheezing, chills or hemoptysis; No shortness of breath  CARDIOVASCULAR: No chest pain, palpitations, dizziness, or leg swelling  GASTROINTESTINAL: No abdominal or epigastric pain. No nausea, vomiting, or hematemesis;   No diarrhea or constipation. No melena or hematochezia.  NEUROLOGICAL: No headaches, memory loss, loss of strength, numbness, or tremors  SKIN: No itching, burning, rashes, or lesions   MUSCULOSKELETAL: No joint pain or swelling; No muscle, back, or extremity pain  PSYCHIATRIC: No depression, anxiety, mood swings, or difficulty sleeping        MEDICATIONS  (STANDING):  aspirin 325 milliGRAM(s) Oral daily  atorvastatin 80 milliGRAM(s) Oral at bedtime  cefuroxime  IVPB 1500 milliGRAM(s) IV Intermittent every 8 hours  chlorhexidine 2% Cloths 1 Application(s) Topical daily  dextrose 5%. 1000 milliLiter(s) (100 mL/Hr) IV Continuous <Continuous>  dextrose 5%. 1000 milliLiter(s) (50 mL/Hr) IV Continuous <Continuous>  dextrose 50% Injectable 25 Gram(s) IV Push once  dextrose 50% Injectable 25 Gram(s) IV Push once  dextrose 50% Injectable 25 milliLiter(s) IV Push every 15 minutes  dextrose 50% Injectable 50 milliLiter(s) IV Push every 15 minutes  dextrose 50% Injectable 12.5 Gram(s) IV Push once  enoxaparin Injectable 40 milliGRAM(s) SubCutaneous every 24 hours  glucagon  Injectable 1 milliGRAM(s) IntraMuscular once  insulin lispro Injectable (ADMELOG) 5 Unit(s) SubCutaneous three times a day before meals  insulin regular Infusion 2 Unit(s)/Hr (2 mL/Hr) IV Continuous <Continuous>  ketorolac   Injectable 15 milliGRAM(s) IV Push every 8 hours  metoprolol tartrate 12.5 milliGRAM(s) Oral two times a day  pantoprazole    Tablet 40 milliGRAM(s) Oral daily  polyethylene glycol 3350 17 Gram(s) Oral daily  senna 2 Tablet(s) Oral at bedtime  sodium chloride 0.9%. 1000 milliLiter(s) (10 mL/Hr) IV Continuous <Continuous>  sodium chloride 0.9%. 1000 milliLiter(s) (5 mL/Hr) IV Continuous <Continuous>  vancomycin  IVPB 1000 milliGRAM(s) IV Intermittent every 12 hours  warfarin 5 milliGRAM(s) Oral once  warfarin 2.5 milliGRAM(s) Oral once    MEDICATIONS  (PRN):  dextrose Oral Gel 15 Gram(s) Oral once PRN Blood Glucose LESS THAN 70 milliGRAM(s)/deciliter  oxyCODONE    IR 5 milliGRAM(s) Oral every 4 hours PRN Moderate Pain (4 - 6)      Vital Signs Last 24 Hrs  T(C): 37.7 (15 Kelvin 2023 07:00), Max: 37.7 (15 Kelvin 2023 07:00)  T(F): 99.9 (15 Kelvin 2023 07:00), Max: 99.9 (15 Kelvin 2023 07:00)  HR: 75 (15 Kelvin 2023 10:00) (72 - 86)  BP: 113/65 (15 Kelvin 2023 09:00) (96/60 - 131/62)  BP(mean): 85 (15 Kelvin 2023 09:00) (74 - 93)  RR: 22 (15 Kelvin 2023 10:00) (11 - 26)  SpO2: 95% (15 Kelvin 2023 10:00) (95% - 100%)    Parameters below as of 15 Kelvin 2023 08:00  Patient On (Oxygen Delivery Method): room air          PHYSICAL EXAM:    Constitutional: NAD, well-groomed, well-developed  HEENT: PERRL, no exophalmos  Neck: No LAD,  no thyroid enlargement  Respiratory: CTAB  Cardiovascular: S1 and S2, RRR, no M/G/R  Gastrointestinal: BS+, soft, no organomegaly or mass  Extremities: No peripheral edema, no pedal lesions  Vascular: 2+ peripheral pulses  Neurological: A/O x 3, no focal deficits  Psychiatric: Normal mood, normal affect  Skin: No rashes, no acanthosis        LABS  06-15    140  |  107  |  17.0  ----------------------------<  115<H>  4.0   |  21.0<L>  |  0.73    Ca    8.5      15 Kelvin 2023 02:05  Mg     2.3     06-15    TPro  5.6<L>  /  Alb  3.4  /  TBili  1.6  /  DBili  0.4<H>  /  AST  56<H>  /  ALT  18  /  AlkPhos  59  06-15                          10.4   9.10  )-----------( 196      ( 15 Kelvin 2023 02:05 )             30.3       A1C with Estimated Average Glucose Result: 6.3 % (06-12-23 @ 16:00)        Ketone - Urine: Negative (06-13 @ 19:35)    Aspartate Aminotransferase (AST/SGOT): 56 U/L (06-15-23 @ 02:05)  Alanine Aminotransferase (ALT/SGPT): 18 U/L (06-15-23 @ 02:05)  Alkaline Phosphatase, Serum: 59 U/L (06-15-23 @ 02:05)  Albumin, Serum: 3.4 g/dL (06-15-23 @ 02:05)  Albumin, Serum: 3.2 g/dL (06-14-23 @ 11:45)  Aspartate Aminotransferase (AST/SGOT): 40 U/L (06-14-23 @ 11:45)  Alanine Aminotransferase (ALT/SGPT): 17 U/L (06-14-23 @ 11:45)  Alkaline Phosphatase, Serum: 61 U/L (06-14-23 @ 11:45)    Thyroid Stimulating Hormone, Serum: 1.71 uIU/mL (06-12-23 @ 16:00)  Free Thyroxine, Serum: 1.2 ng/dL (06-12-23 @ 16:00)    CAPILLARY BLOOD GLUCOSE      POCT Blood Glucose.: 216 mg/dL (15 Kelvin 2023 12:11)  POCT Blood Glucose.: 222 mg/dL (15 Kelvin 2023 11:16)  POCT Blood Glucose.: 257 mg/dL (15 Kelvin 2023 10:23)  POCT Blood Glucose.: 268 mg/dL (15 Kelvin 2023 09:22)  POCT Blood Glucose.: 150 mg/dL (15 Kelvin 2023 06:50)  POCT Blood Glucose.: 151 mg/dL (15 Kelvin 2023 05:27)  POCT Blood Glucose.: 136 mg/dL (15 Kelvin 2023 04:09)  POCT Blood Glucose.: 124 mg/dL (15 Kelvin 2023 03:01)  POCT Blood Glucose.: 96 mg/dL (15 Kelvin 2023 00:29)  POCT Blood Glucose.: 96 mg/dL (15 Kelvin 2023 00:10)  POCT Blood Glucose.: 100 mg/dL (14 Jun 2023 22:04)  POCT Blood Glucose.: 106 mg/dL (14 Jun 2023 20:03)  POCT Blood Glucose.: 125 mg/dL (14 Jun 2023 18:03)  POCT Blood Glucose.: 141 mg/dL (14 Jun 2023 15:58)  POCT Blood Glucose.: 149 mg/dL (14 Jun 2023 15:00)  POCT Blood Glucose.: 135 mg/dL (14 Jun 2023 13:55)  POCT Blood Glucose.: 142 mg/dL (14 Jun 2023 12:58)       Patient is a 68y old  Male who presents with a chief complaint of Nonrheumatic mitral valve regurgitation  HPI:  69 yo male with h/o CAD s/p PCI of RCA 2/16/2021, HTN, HLD, DM, HCV s/p treatment and cardiac murmur with MR. He saw Dr. Desouza for evaluation of moderate to severe MR and was referred to Dr. Islas for mitraClip evaluation.   In April he had a syncopal episode and [place] [facility] [place] with syncope and endocarditis and completed 4 weeks of antibiotics as of April 12.   He is being referred for QUINCY to evaluated mitral valve post endocarditis and if he is still a candidate for MitralClip. He does not report any SOB at this time but has not increased activity. + GUERIN and fatigue.   6/14 underwent MVR with Dr. Islas.  On admission a 1c 6.3%, at home on , trulicity weekly injection and insulin prn( not sure which on one and how  many units, wife helps him who is not here today) , but says did not need it so had not taken it in last 2 months.            PAST MEDICAL & SURGICAL HISTORY:  Lung nodule    Severe mitral regurgitation    Aortic stenosis    Chronic hepatitis C virus infection    Coronary artery disease of native artery of native heart with stable angina pectoris    Type 2 diabetes mellitus with other circulatory complication, with long-term current use of insulin    Primary hypertension    Mixed hyperlipidemia    Hemochromatosis    H/O neck surgery  PCDF 1992,  ACDF 1993    S/P appendectomy  1974,  right shoulder dislocation 1962    H/O hernia repair  1985    S/P spinal fusion    S/P shoulder surgery    Status post insertion of drug-eluting stent into right coronary artery for coronary artery disease    History of ERCP        Social History:        Marital: , lives with wife       Tobacco: Former 0.5 ppd smoker for 10 years, quit 30 years ago.       ETOH: Denies       Drugs: Denies       Caffeine: 1 cup of coffee daily      FAMILY HISTORY:  Family history of diabetes mellitus    Family history of diabetes mellitus          Allergies    No Known Allergies            REVIEW OF SYSTEMS:    CONSTITUTIONAL: No fever, weight loss, + fatigue  EYES: No eye pain, visual disturbances, or discharge  ENMT:  No difficulty hearing, tinnitus, vertigo; No sinus or throat pain  NECK: No pain or stiffness  RESPIRATORY: No cough, wheezing, chills or hemoptysis; No shortness of breath  CARDIOVASCULAR: + chest tenderness, no  palpitations, dizziness, or leg swelling  GASTROINTESTINAL: No abdominal or epigastric pain. No nausea, vomiting, or hematemesis;   No diarrhea or constipation. No melena or hematochezia.  NEUROLOGICAL: No headaches, memory loss, loss of strength, numbness, or tremors  SKIN: No itching, burning, rashes, or lesions   MUSCULOSKELETAL: No joint pain or swelling; No muscle, back, or extremity pain  PSYCHIATRIC: No depression, anxiety, mood swings, or difficulty sleeping        MEDICATIONS  (STANDING):  aspirin 325 milliGRAM(s) Oral daily  atorvastatin 80 milliGRAM(s) Oral at bedtime  cefuroxime  IVPB 1500 milliGRAM(s) IV Intermittent every 8 hours  chlorhexidine 2% Cloths 1 Application(s) Topical daily  dextrose 5%. 1000 milliLiter(s) (100 mL/Hr) IV Continuous <Continuous>  dextrose 5%. 1000 milliLiter(s) (50 mL/Hr) IV Continuous <Continuous>  dextrose 50% Injectable 25 Gram(s) IV Push once  dextrose 50% Injectable 25 Gram(s) IV Push once  dextrose 50% Injectable 25 milliLiter(s) IV Push every 15 minutes  dextrose 50% Injectable 50 milliLiter(s) IV Push every 15 minutes  dextrose 50% Injectable 12.5 Gram(s) IV Push once  enoxaparin Injectable 40 milliGRAM(s) SubCutaneous every 24 hours  glucagon  Injectable 1 milliGRAM(s) IntraMuscular once  insulin lispro Injectable (ADMELOG) 5 Unit(s) SubCutaneous three times a day before meals  insulin regular Infusion 2 Unit(s)/Hr (2 mL/Hr) IV Continuous <Continuous>  ketorolac   Injectable 15 milliGRAM(s) IV Push every 8 hours  metoprolol tartrate 12.5 milliGRAM(s) Oral two times a day  pantoprazole    Tablet 40 milliGRAM(s) Oral daily  polyethylene glycol 3350 17 Gram(s) Oral daily  senna 2 Tablet(s) Oral at bedtime  sodium chloride 0.9%. 1000 milliLiter(s) (10 mL/Hr) IV Continuous <Continuous>  sodium chloride 0.9%. 1000 milliLiter(s) (5 mL/Hr) IV Continuous <Continuous>  vancomycin  IVPB 1000 milliGRAM(s) IV Intermittent every 12 hours  warfarin 5 milliGRAM(s) Oral once  warfarin 2.5 milliGRAM(s) Oral once    MEDICATIONS  (PRN):  dextrose Oral Gel 15 Gram(s) Oral once PRN Blood Glucose LESS THAN 70 milliGRAM(s)/deciliter  oxyCODONE    IR 5 milliGRAM(s) Oral every 4 hours PRN Moderate Pain (4 - 6)      Vital Signs Last 24 Hrs  T(C): 37.7 (15 Kelvin 2023 07:00), Max: 37.7 (15 Kelvin 2023 07:00)  T(F): 99.9 (15 Kelvin 2023 07:00), Max: 99.9 (15 Kelvin 2023 07:00)  HR: 75 (15 Kelvin 2023 10:00) (72 - 86)  BP: 113/65 (15 Kelvin 2023 09:00) (96/60 - 131/62)  BP(mean): 85 (15 Kelvin 2023 09:00) (74 - 93)  RR: 22 (15 Kelvin 2023 10:00) (11 - 26)  SpO2: 95% (15 Kelvin 2023 10:00) (95% - 100%)    Parameters below as of 15 Kelvin 2023 08:00  Patient On (Oxygen Delivery Method): room air          PHYSICAL EXAM:    Constitutional: NAD, well-groomed, well-developed  HEENT: PERRL, no exophalmos  Neck: No LAD,  no thyroid enlargement  Respiratory: CTAB  Cardiovascular: S1 and S2, RRR, no M/G/R  Gastrointestinal: BS+, soft, no organomegaly or mass  Extremities: No peripheral edema, no pedal lesions, both legs in compression devide  Neurological: A/O x 3, no focal deficits  Psychiatric: Normal mood, normal affect  Skin: No rashes, no acanthosis        LABS  06-15    140  |  107  |  17.0  ----------------------------<  115<H>  4.0   |  21.0<L>  |  0.73    Ca    8.5      15 Kelvin 2023 02:05  Mg     2.3     06-15    TPro  5.6<L>  /  Alb  3.4  /  TBili  1.6  /  DBili  0.4<H>  /  AST  56<H>  /  ALT  18  /  AlkPhos  59  06-15                          10.4   9.10  )-----------( 196      ( 15 Kelvin 2023 02:05 )             30.3       A1C with Estimated Average Glucose Result: 6.3 % (06-12-23 @ 16:00)        Ketone - Urine: Negative (06-13 @ 19:35)    Aspartate Aminotransferase (AST/SGOT): 56 U/L (06-15-23 @ 02:05)  Alanine Aminotransferase (ALT/SGPT): 18 U/L (06-15-23 @ 02:05)  Alkaline Phosphatase, Serum: 59 U/L (06-15-23 @ 02:05)  Albumin, Serum: 3.4 g/dL (06-15-23 @ 02:05)  Albumin, Serum: 3.2 g/dL (06-14-23 @ 11:45)  Aspartate Aminotransferase (AST/SGOT): 40 U/L (06-14-23 @ 11:45)  Alanine Aminotransferase (ALT/SGPT): 17 U/L (06-14-23 @ 11:45)  Alkaline Phosphatase, Serum: 61 U/L (06-14-23 @ 11:45)    Thyroid Stimulating Hormone, Serum: 1.71 uIU/mL (06-12-23 @ 16:00)  Free Thyroxine, Serum: 1.2 ng/dL (06-12-23 @ 16:00)    CAPILLARY BLOOD GLUCOSE      POCT Blood Glucose.: 216 mg/dL (15 Kelvin 2023 12:11)  POCT Blood Glucose.: 222 mg/dL (15 Kelvin 2023 11:16)  POCT Blood Glucose.: 257 mg/dL (15 Kelvin 2023 10:23)  POCT Blood Glucose.: 268 mg/dL (15 Kelvin 2023 09:22)  POCT Blood Glucose.: 150 mg/dL (15 Kelvin 2023 06:50)  POCT Blood Glucose.: 151 mg/dL (15 Kelvin 2023 05:27)  POCT Blood Glucose.: 136 mg/dL (15 Kelvin 2023 04:09)  POCT Blood Glucose.: 124 mg/dL (15 Kelvin 2023 03:01)  POCT Blood Glucose.: 96 mg/dL (15 Kelvin 2023 00:29)  POCT Blood Glucose.: 96 mg/dL (15 Kelvin 2023 00:10)  POCT Blood Glucose.: 100 mg/dL (14 Jun 2023 22:04)  POCT Blood Glucose.: 106 mg/dL (14 Jun 2023 20:03)  POCT Blood Glucose.: 125 mg/dL (14 Jun 2023 18:03)  POCT Blood Glucose.: 141 mg/dL (14 Jun 2023 15:58)  POCT Blood Glucose.: 149 mg/dL (14 Jun 2023 15:00)  POCT Blood Glucose.: 135 mg/dL (14 Jun 2023 13:55)  POCT Blood Glucose.: 142 mg/dL (14 Jun 2023 12:58)

## 2023-06-15 NOTE — PHYSICAL THERAPY INITIAL EVALUATION ADULT - MD ORDER
REASON FOR VISIT:  Follow up kidney stones and bladder instability      This is a 69 year old female with a Left ureteral stone who had undergone Ureteroscopy with laser lithotripsy with stent in 2010.  She has uric acid stones and is on potassium citrate BID.  The patient returns today for follow-up. She is on potassium citrate and has been quite compliant. Recently she had experienced some minor flank pain which has resolved.      No flank pain, no hematuria, no dysuria symptoms reported today.  Patient is planning on moving to June 1, 2018 to [place].         EXAMINATION:  Alert and awake. Presents alone.   ABDOMEN:  Soft. Obese.  No flank tenderness.  EXT: Using walker          Recent Labs  Lab 04/09/18  1415 11/21/17  0815 09/15/17  0843   WBC 8.4 6.7 8.8   HGB 12.9 13.3 11.7*   HCT 39.4 40.6 35.7*    181 383   SODIUM 138 139 139   POTASSIUM 4.5 4.2 5.0   BUN 24* 24* 21*   CREATININE 0.94 0.87 1.12*   GLUCOSE 279* 195* 154*       CT scan impression 4/10/2018     IMPRESSION: The examination was limited somewhat due to the patient's large  size and artifacts in the pelvis.     There are 2 small calyx calcifications associated with the right kidney  causing no obstruction. The left kidney demonstrates no definite calyx  calcifications with certainty and no definite significant obstruction.   Degenerative changes of the lumbar spine artifacts obscuring the lower  portion of the pelvis     Broad-based lower abdominal wall hernia, unchanged.       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  Strategies to help reduce stone disease are no-added salt diet and increasing urine volumes to 2 liters per day on a routine basis through intake of increased oral fluid intake. .   Recommend lemon juice in the form of lemonade as part of fluid intake to help  increase urinary citrate  For recurrent stone formers metabolic stone evaluation needed.  No more than 1or 2 cups of coffee or 1 cup of tea a day.  Finally I also recommended a low animal protein diet.    I, GAGE Laguerre, am acting as a scribe for Dr. Christopher Williamson.  Dr. Christopher Williamson was present during the entirety of this visit and performed the patient's history, physical examination, impression and plan/medical decision making.    I saw the patient during the office visit, interviewed and examined the patient and personally performed patient's clinical impressions and treatment plan while GAGE Laguerre  functioned as my scribe.      PT Consult

## 2023-06-15 NOTE — CONSULT NOTE ADULT - ASSESSMENT
68M, pmhx CAD s/p PCI of RCA 2/16/2021, HTN, HLD, DM, hyperthyroidism, hemochromatosis, neck surgery s/p PCDF & ACDF, appendectomy, hernia repair, R shoulder dislocation s/p repair, HCV s/p treatment, known MR initially planned to be evaluated for mitral clip, s/p repeat QUINCY 6/12 to assess valve, found to have severe MR with multiple mobile echodensities. 6/14 underwent MVR with Dr. Islas.   6/14 underwent MVR with Dr. Islas.  On admission a 1c 6.3%, at home on insulin, trulicity.     Type 2 diabetes mellitus   -Currently on insulin gtt, transition to basal bolus when appropriate.  -Will recommend to start lantus 15 units daily, to switch off insulin gtt 2 hr after first dose.  -To continue admelog 5 units tid with meals and ssi.  -diabetic diet.  -fingerstick ac tid hs.      h/o Hyperthyroidism, on MMI at home:  -thyroid functions here normal, ok to hold methimazole fro now.  repeat TFTs as outpt in 4 to 6 weeks.    hyperlipidemia.   -Continue statin     Severe mitral regurgitation.  s/p MVR  - c/w Beta blocker ,Aspirin  -Encourage PO intake  -Encourage OOB to chair and ambulation with PT  -Chest PT       68M, pmhx CAD s/p PCI of RCA 2/16/2021, HTN, HLD, DM, hyperthyroidism, hemochromatosis, neck surgery s/p PCDF & ACDF, appendectomy, hernia repair, R shoulder dislocation s/p repair, HCV s/p treatment, known MR initially planned to be evaluated for mitral clip, s/p repeat QUINCY 6/12 to assess valve, found to have severe MR with multiple mobile echodensities. 6/14 underwent MVR with Dr. Islas.   6/14 underwent MVR with Dr. Islas.  On admission a 1c 6.3%, at home on  trulicity and insulin PRN whic he had not needed in 2 months.     Type 2 diabetes mellitus   -Currently on insulin gtt, transition to basal bolus when appropriate.  -Will recommend to start lantus 15 units daily, to switch off insulin gtt 2 hr after first dose.  -To continue admelog 5 units tid with meals and ssi.  -diabetic diet.  -fingerstick ac tid hs.      h/o Hyperthyroidism, on MMI at home: has been on it for 3 years.  -thyroid functions here normal, ok to continue  methimazole 5 mg daily for now.  repeat TFTs as outpt in 4 to 6 weeks.    hyperlipidemia.   -Continue statin     Severe mitral regurgitation.  s/p MVR  - c/w Beta blocker ,Aspirin  -Encourage PO intake  -Encourage OOB to chair and ambulation with PT  -Chest PT

## 2023-06-16 DIAGNOSIS — D62 ACUTE POSTHEMORRHAGIC ANEMIA: ICD-10-CM

## 2023-06-16 LAB
ANION GAP SERPL CALC-SCNC: 13 MMOL/L — SIGNIFICANT CHANGE UP (ref 5–17)
ANION GAP SERPL CALC-SCNC: 16 MMOL/L — SIGNIFICANT CHANGE UP (ref 5–17)
BLD GP AB SCN SERPL QL: SIGNIFICANT CHANGE UP
BUN SERPL-MCNC: 21.5 MG/DL — HIGH (ref 8–20)
BUN SERPL-MCNC: 36.4 MG/DL — HIGH (ref 8–20)
CALCIUM SERPL-MCNC: 7.8 MG/DL — LOW (ref 8.4–10.5)
CALCIUM SERPL-MCNC: 8.1 MG/DL — LOW (ref 8.4–10.5)
CHLORIDE SERPL-SCNC: 102 MMOL/L — SIGNIFICANT CHANGE UP (ref 96–108)
CHLORIDE SERPL-SCNC: 104 MMOL/L — SIGNIFICANT CHANGE UP (ref 96–108)
CO2 SERPL-SCNC: 17 MMOL/L — LOW (ref 22–29)
CO2 SERPL-SCNC: 17 MMOL/L — LOW (ref 22–29)
CREAT SERPL-MCNC: 0.91 MG/DL — SIGNIFICANT CHANGE UP (ref 0.5–1.3)
CREAT SERPL-MCNC: 1.35 MG/DL — HIGH (ref 0.5–1.3)
EGFR: 57 ML/MIN/1.73M2 — LOW
EGFR: 92 ML/MIN/1.73M2 — SIGNIFICANT CHANGE UP
GAS PNL BLDA: SIGNIFICANT CHANGE UP
GLUCOSE BLDC GLUCOMTR-MCNC: 136 MG/DL — HIGH (ref 70–99)
GLUCOSE BLDC GLUCOMTR-MCNC: 142 MG/DL — HIGH (ref 70–99)
GLUCOSE BLDC GLUCOMTR-MCNC: 148 MG/DL — HIGH (ref 70–99)
GLUCOSE BLDC GLUCOMTR-MCNC: 150 MG/DL — HIGH (ref 70–99)
GLUCOSE BLDC GLUCOMTR-MCNC: 172 MG/DL — HIGH (ref 70–99)
GLUCOSE BLDC GLUCOMTR-MCNC: 210 MG/DL — HIGH (ref 70–99)
GLUCOSE SERPL-MCNC: 156 MG/DL — HIGH (ref 70–99)
GLUCOSE SERPL-MCNC: 195 MG/DL — HIGH (ref 70–99)
HCT VFR BLD CALC: 25.8 % — LOW (ref 39–50)
HCT VFR BLD CALC: 27.8 % — LOW (ref 39–50)
HCT VFR BLD CALC: 28.8 % — LOW (ref 39–50)
HCT VFR BLD CALC: 28.8 % — LOW (ref 39–50)
HGB BLD-MCNC: 8.7 G/DL — LOW (ref 13–17)
HGB BLD-MCNC: 9.5 G/DL — LOW (ref 13–17)
HGB BLD-MCNC: 9.8 G/DL — LOW (ref 13–17)
HGB BLD-MCNC: 9.9 G/DL — LOW (ref 13–17)
INR BLD: 1.56 RATIO — HIGH (ref 0.88–1.16)
INR BLD: 1.57 RATIO — HIGH (ref 0.88–1.16)
LACTATE SERPL-SCNC: 1.4 MMOL/L — SIGNIFICANT CHANGE UP (ref 0.5–2)
MAGNESIUM SERPL-MCNC: 1.9 MG/DL — SIGNIFICANT CHANGE UP (ref 1.6–2.6)
MAGNESIUM SERPL-MCNC: 3 MG/DL — HIGH (ref 1.6–2.6)
MCHC RBC-ENTMCNC: 29.9 PG — SIGNIFICANT CHANGE UP (ref 27–34)
MCHC RBC-ENTMCNC: 30.1 PG — SIGNIFICANT CHANGE UP (ref 27–34)
MCHC RBC-ENTMCNC: 30.7 PG — SIGNIFICANT CHANGE UP (ref 27–34)
MCHC RBC-ENTMCNC: 30.9 PG — SIGNIFICANT CHANGE UP (ref 27–34)
MCHC RBC-ENTMCNC: 33.7 GM/DL — SIGNIFICANT CHANGE UP (ref 32–36)
MCHC RBC-ENTMCNC: 34 GM/DL — SIGNIFICANT CHANGE UP (ref 32–36)
MCHC RBC-ENTMCNC: 34.2 GM/DL — SIGNIFICANT CHANGE UP (ref 32–36)
MCHC RBC-ENTMCNC: 34.4 GM/DL — SIGNIFICANT CHANGE UP (ref 32–36)
MCV RBC AUTO: 88 FL — SIGNIFICANT CHANGE UP (ref 80–100)
MCV RBC AUTO: 88.7 FL — SIGNIFICANT CHANGE UP (ref 80–100)
MCV RBC AUTO: 89.2 FL — SIGNIFICANT CHANGE UP (ref 80–100)
MCV RBC AUTO: 90.9 FL — SIGNIFICANT CHANGE UP (ref 80–100)
PLATELET # BLD AUTO: 143 K/UL — LOW (ref 150–400)
PLATELET # BLD AUTO: 145 K/UL — LOW (ref 150–400)
PLATELET # BLD AUTO: 153 K/UL — SIGNIFICANT CHANGE UP (ref 150–400)
PLATELET # BLD AUTO: 205 K/UL — SIGNIFICANT CHANGE UP (ref 150–400)
POTASSIUM SERPL-MCNC: 3.8 MMOL/L — SIGNIFICANT CHANGE UP (ref 3.5–5.3)
POTASSIUM SERPL-MCNC: 4.1 MMOL/L — SIGNIFICANT CHANGE UP (ref 3.5–5.3)
POTASSIUM SERPL-SCNC: 3.8 MMOL/L — SIGNIFICANT CHANGE UP (ref 3.5–5.3)
POTASSIUM SERPL-SCNC: 4.1 MMOL/L — SIGNIFICANT CHANGE UP (ref 3.5–5.3)
PROTHROM AB SERPL-ACNC: 18.2 SEC — HIGH (ref 10.5–13.4)
PROTHROM AB SERPL-ACNC: 18.3 SEC — HIGH (ref 10.5–13.4)
RBC # BLD: 2.91 M/UL — LOW (ref 4.2–5.8)
RBC # BLD: 3.16 M/UL — LOW (ref 4.2–5.8)
RBC # BLD: 3.17 M/UL — LOW (ref 4.2–5.8)
RBC # BLD: 3.23 M/UL — LOW (ref 4.2–5.8)
RBC # FLD: 13.6 % — SIGNIFICANT CHANGE UP (ref 10.3–14.5)
RBC # FLD: 13.8 % — SIGNIFICANT CHANGE UP (ref 10.3–14.5)
RBC # FLD: 14.3 % — SIGNIFICANT CHANGE UP (ref 10.3–14.5)
RBC # FLD: 14.5 % — SIGNIFICANT CHANGE UP (ref 10.3–14.5)
SODIUM SERPL-SCNC: 134 MMOL/L — LOW (ref 135–145)
SODIUM SERPL-SCNC: 135 MMOL/L — SIGNIFICANT CHANGE UP (ref 135–145)
WBC # BLD: 10.17 K/UL — SIGNIFICANT CHANGE UP (ref 3.8–10.5)
WBC # BLD: 12.53 K/UL — HIGH (ref 3.8–10.5)
WBC # BLD: 13.92 K/UL — HIGH (ref 3.8–10.5)
WBC # BLD: 15.92 K/UL — HIGH (ref 3.8–10.5)
WBC # FLD AUTO: 10.17 K/UL — SIGNIFICANT CHANGE UP (ref 3.8–10.5)
WBC # FLD AUTO: 12.53 K/UL — HIGH (ref 3.8–10.5)
WBC # FLD AUTO: 13.92 K/UL — HIGH (ref 3.8–10.5)
WBC # FLD AUTO: 15.92 K/UL — HIGH (ref 3.8–10.5)

## 2023-06-16 PROCEDURE — 93010 ELECTROCARDIOGRAM REPORT: CPT

## 2023-06-16 PROCEDURE — 99291 CRITICAL CARE FIRST HOUR: CPT

## 2023-06-16 PROCEDURE — 71045 X-RAY EXAM CHEST 1 VIEW: CPT | Mod: 26

## 2023-06-16 PROCEDURE — 99231 SBSQ HOSP IP/OBS SF/LOW 25: CPT

## 2023-06-16 PROCEDURE — 99024 POSTOP FOLLOW-UP VISIT: CPT

## 2023-06-16 PROCEDURE — 93306 TTE W/DOPPLER COMPLETE: CPT | Mod: 26

## 2023-06-16 RX ORDER — SODIUM CHLORIDE 9 MG/ML
3 INJECTION INTRAMUSCULAR; INTRAVENOUS; SUBCUTANEOUS EVERY 8 HOURS
Refills: 0 | Status: DISCONTINUED | OUTPATIENT
Start: 2023-06-16 | End: 2023-06-21

## 2023-06-16 RX ORDER — MAGNESIUM SULFATE 500 MG/ML
2 VIAL (ML) INJECTION ONCE
Refills: 0 | Status: COMPLETED | OUTPATIENT
Start: 2023-06-16 | End: 2023-06-16

## 2023-06-16 RX ORDER — NOREPINEPHRINE BITARTRATE/D5W 8 MG/250ML
0.05 PLASTIC BAG, INJECTION (ML) INTRAVENOUS
Qty: 8 | Refills: 0 | Status: DISCONTINUED | OUTPATIENT
Start: 2023-06-16 | End: 2023-06-16

## 2023-06-16 RX ORDER — POTASSIUM CHLORIDE 20 MEQ
40 PACKET (EA) ORAL ONCE
Refills: 0 | Status: COMPLETED | OUTPATIENT
Start: 2023-06-16 | End: 2023-06-16

## 2023-06-16 RX ORDER — WARFARIN SODIUM 2.5 MG/1
2.5 TABLET ORAL ONCE
Refills: 0 | Status: COMPLETED | OUTPATIENT
Start: 2023-06-16 | End: 2023-06-16

## 2023-06-16 RX ADMIN — Medication 7.28 MICROGRAM(S)/KG/MIN: at 00:46

## 2023-06-16 RX ADMIN — SENNA PLUS 2 TABLET(S): 8.6 TABLET ORAL at 22:09

## 2023-06-16 RX ADMIN — Medication 100 MILLIGRAM(S): at 07:26

## 2023-06-16 RX ADMIN — Medication 975 MILLIGRAM(S): at 12:19

## 2023-06-16 RX ADMIN — PANTOPRAZOLE SODIUM 40 MILLIGRAM(S): 20 TABLET, DELAYED RELEASE ORAL at 11:26

## 2023-06-16 RX ADMIN — Medication 5 UNIT(S): at 12:21

## 2023-06-16 RX ADMIN — Medication 975 MILLIGRAM(S): at 05:54

## 2023-06-16 RX ADMIN — SODIUM CHLORIDE 3 MILLILITER(S): 9 INJECTION INTRAMUSCULAR; INTRAVENOUS; SUBCUTANEOUS at 13:02

## 2023-06-16 RX ADMIN — INSULIN GLARGINE 15 UNIT(S): 100 INJECTION, SOLUTION SUBCUTANEOUS at 00:27

## 2023-06-16 RX ADMIN — Medication 250 MILLIGRAM(S): at 08:18

## 2023-06-16 RX ADMIN — SODIUM CHLORIDE 3 MILLILITER(S): 9 INJECTION INTRAMUSCULAR; INTRAVENOUS; SUBCUTANEOUS at 22:03

## 2023-06-16 RX ADMIN — Medication 5 UNIT(S): at 08:05

## 2023-06-16 RX ADMIN — Medication 1 PACKET(S): at 05:17

## 2023-06-16 RX ADMIN — Medication 15 MILLIGRAM(S): at 05:54

## 2023-06-16 RX ADMIN — ENOXAPARIN SODIUM 40 MILLIGRAM(S): 100 INJECTION SUBCUTANEOUS at 06:57

## 2023-06-16 RX ADMIN — Medication 975 MILLIGRAM(S): at 19:06

## 2023-06-16 RX ADMIN — OXYCODONE HYDROCHLORIDE 5 MILLIGRAM(S): 5 TABLET ORAL at 22:38

## 2023-06-16 RX ADMIN — Medication 975 MILLIGRAM(S): at 11:26

## 2023-06-16 RX ADMIN — OXYCODONE HYDROCHLORIDE 5 MILLIGRAM(S): 5 TABLET ORAL at 18:02

## 2023-06-16 RX ADMIN — INSULIN GLARGINE 15 UNIT(S): 100 INJECTION, SOLUTION SUBCUTANEOUS at 22:20

## 2023-06-16 RX ADMIN — OXYCODONE HYDROCHLORIDE 5 MILLIGRAM(S): 5 TABLET ORAL at 12:29

## 2023-06-16 RX ADMIN — OXYCODONE HYDROCHLORIDE 5 MILLIGRAM(S): 5 TABLET ORAL at 22:08

## 2023-06-16 RX ADMIN — Medication 40 MILLIEQUIVALENT(S): at 04:05

## 2023-06-16 RX ADMIN — INSULIN HUMAN 2 UNIT(S)/HR: 100 INJECTION, SOLUTION SUBCUTANEOUS at 00:48

## 2023-06-16 RX ADMIN — Medication 4: at 12:22

## 2023-06-16 RX ADMIN — Medication 975 MILLIGRAM(S): at 18:03

## 2023-06-16 RX ADMIN — Medication 2: at 08:05

## 2023-06-16 RX ADMIN — POLYETHYLENE GLYCOL 3350 17 GRAM(S): 17 POWDER, FOR SOLUTION ORAL at 11:26

## 2023-06-16 RX ADMIN — Medication 25 GRAM(S): at 05:16

## 2023-06-16 RX ADMIN — OXYCODONE HYDROCHLORIDE 5 MILLIGRAM(S): 5 TABLET ORAL at 19:02

## 2023-06-16 RX ADMIN — WARFARIN SODIUM 2.5 MILLIGRAM(S): 2.5 TABLET ORAL at 22:09

## 2023-06-16 RX ADMIN — Medication 1 PACKET(S): at 17:33

## 2023-06-16 RX ADMIN — Medication 15 MILLIGRAM(S): at 05:12

## 2023-06-16 RX ADMIN — ATORVASTATIN CALCIUM 80 MILLIGRAM(S): 80 TABLET, FILM COATED ORAL at 22:09

## 2023-06-16 RX ADMIN — Medication 25 GRAM(S): at 04:05

## 2023-06-16 RX ADMIN — OXYCODONE HYDROCHLORIDE 5 MILLIGRAM(S): 5 TABLET ORAL at 06:59

## 2023-06-16 RX ADMIN — Medication 975 MILLIGRAM(S): at 05:16

## 2023-06-16 RX ADMIN — Medication 325 MILLIGRAM(S): at 11:26

## 2023-06-16 RX ADMIN — Medication 5 UNIT(S): at 17:33

## 2023-06-16 RX ADMIN — OXYCODONE HYDROCHLORIDE 5 MILLIGRAM(S): 5 TABLET ORAL at 13:29

## 2023-06-16 RX ADMIN — Medication 40 MILLIEQUIVALENT(S): at 05:16

## 2023-06-16 NOTE — PROGRESS NOTE ADULT - PROBLEM SELECTOR PLAN 1
s/p MV Replacement 6/14/23 with Dr. Islas   On Coumadin daily for MVR  Beta blocker held 6/15 in setting of hypotension, reassess restarting daily  Aspirin for valve prophylaxis  Encourage PO intake  Encourage OOB to chair and ambulation with PT  Encourage deep breathing exercised and coughing  Chest PT and IS use with bedside nurse     Plan to be discussed with CT Surgery attendings during AM rounds.

## 2023-06-16 NOTE — HISTORY OF PRESENT ILLNESS
[FreeTextEntry1] : Mr. Palmer is a 65 year old male referred by Dr. Desouza for evaluation of Severe Mitral Regurgitation. He has a a past medical history significant for Hypertension, Hyperlipidemia, Diabetes Mellitus, HCV s/p treatment and Cardiac Murmur. He has known Hepatitis C and Cirrhosis and is seen by Dr. Nelson. \par \par He returns to the office today for follow up care from his visit in April. He has been evaluated by Dr Desouza recently and the option for MitraClip was discussed again. He has a recent echocardiogram which demonstrated moderate mitral regurgitation.\par

## 2023-06-16 NOTE — PROGRESS NOTE ADULT - SUBJECTIVE AND OBJECTIVE BOX
INTERVAL EVENTS:  Follow up diabetes management    ROS: Denies chest pain, sob, abd pain.     MEDICATIONS  (STANDING):  acetaminophen     Tablet .. 975 milliGRAM(s) Oral every 6 hours  aspirin enteric coated 325 milliGRAM(s) Oral daily  atorvastatin 80 milliGRAM(s) Oral at bedtime  chlorhexidine 2% Cloths 1 Application(s) Topical daily  enoxaparin Injectable 40 milliGRAM(s) SubCutaneous every 24 hours  glucagon  Injectable 1 milliGRAM(s) IntraMuscular once  insulin glargine Injectable (LANTUS) 15 Unit(s) SubCutaneous at bedtime  insulin lispro (ADMELOG) corrective regimen sliding scale   SubCutaneous Before meals and at bedtime  insulin lispro Injectable (ADMELOG) 5 Unit(s) SubCutaneous three times a day before meals  methimazole 5 milliGRAM(s) Oral daily  pantoprazole    Tablet 40 milliGRAM(s) Oral daily  polyethylene glycol 3350 17 Gram(s) Oral daily  psyllium Powder 1 Packet(s) Oral two times a day  senna 2 Tablet(s) Oral at bedtime  sodium chloride 0.9% lock flush 3 milliLiter(s) IV Push every 8 hours  sodium chloride 0.9%. 1000 milliLiter(s) (10 mL/Hr) IV Continuous <Continuous>  sodium chloride 0.9%. 1000 milliLiter(s) (5 mL/Hr) IV Continuous <Continuous>  warfarin 2.5 milliGRAM(s) Oral once    MEDICATIONS  (PRN):  dextrose Oral Gel 15 Gram(s) Oral once PRN Blood Glucose LESS THAN 70 milliGRAM(s)/deciliter  oxyCODONE    IR 5 milliGRAM(s) Oral every 4 hours PRN Moderate Pain (4 - 6)    Allergies  No Known Allergies    Vital Signs Last 24 Hrs  T(C): 36.6 (16 Jun 2023 09:32), Max: 38.4 (15 Kelvin 2023 19:00)  T(F): 97.8 (16 Jun 2023 09:32), Max: 101.1 (15 Kelvin 2023 19:00)  HR: 69 (16 Jun 2023 09:32) (67 - 83)  BP: 96/56 (16 Jun 2023 09:32) (81/53 - 115/70)  BP(mean): 66 (16 Jun 2023 09:00) (63 - 81)  RR: 17 (16 Jun 2023 09:32) (10 - 32)  SpO2: 95% (16 Jun 2023 09:32) (93% - 99%)    Parameters below as of 16 Jun 2023 09:32  Patient On (Oxygen Delivery Method): room air      PHYSICAL EXAM  General: No apparent distress  Neck: Supple, trachea midline, no thyromegaly  Respiratory: Lungs clear bilaterally, normal rate, effort  Cardiac: +S1, S2, no m/r/g  GI: +BS, soft, non tender, non distended  Extremities: No peripheral edema, no pedal lesions  Neuro: A+O X3, no tremor      LABS:                        9.5    15.92 )-----------( 205      ( 16 Jun 2023 02:15 )             27.8     06-16    135  |  102  |  21.5<H>  ----------------------------<  195<H>  3.8   |  17.0<L>  |  0.91    Ca    8.1<L>      16 Jun 2023 02:15  Mg     1.9     06-16    TPro  5.6<L>  /  Alb  3.4  /  TBili  1.6  /  DBili  0.4<H>  /  AST  56<H>  /  ALT  18  /  AlkPhos  59  06-15      POCT Blood Glucose.: 172 mg/dL (06-16-23 @ 07:50)  POCT Blood Glucose.: 148 mg/dL (06-15-23 @ 23:51)  POCT Blood Glucose.: 109 mg/dL (06-15-23 @ 20:55)  POCT Blood Glucose.: 83 mg/dL (06-15-23 @ 20:38)  POCT Blood Glucose.: 76 mg/dL (06-15-23 @ 20:24)  POCT Blood Glucose.: 88 mg/dL (06-15-23 @ 20:06)  POCT Blood Glucose.: 132 mg/dL (06-15-23 @ 18:09)        Thyroid Stimulating Hormone, Serum: 1.71 uIU/mL (06-12-23 @ 16:00)  Free Thyroxine, Serum: 1.2 ng/dL (06-12-23 @ 16:00)

## 2023-06-16 NOTE — DATA REVIEWED
[FreeTextEntry1] : Transesophageal Echocardiogram from 06/14/23 [facility] \par - LVEF 55-60%\par - Flailed P2 scallop of the posterior mitral leaflet with torn choradae resulting in at least moderate to severe mitral valve regurgitation, jet is eccentric\par - The aortic valve is trileaflet. Sclerotic valve with with decreased opening\par - Post op a 29 mm bioprosthetic mitral valve replaced with mean gradient of 4 mmHg, mild paravalvular regurgitation at the suture\par - This was compared against his prior echocardiograms\par \par \par \par \par \par \par Transesophageal Echocardiogram from 04/17/23 at [facility] \par - LVEF 50-55%\par - The NCC of the aortic valve is moderately calcified and non mobile\par - Posterior mitral valve is flail\par - Ruptured Chordae tendineae to the anterior mitral leaflet\par - Moderate to severe eccentric mitral regurgitation\par - Unable to rule out vegetation of mitral valve leaflets due to structural nature of valve\par \par \par \par \par \par \par \par Transthoracic Echocardiogram from 9/13/21 at [facility]\par - LVEF 45-50%\par - Bileaflet mitral valve prolapse. Moderate eccentric posterior mitral regurgitation\par - Thickened aortic valve with calcified NCC. No regurgitation\par \par \par Transesophageal Echocardiogram [facility] on 3/26/21\par - LVEF 60-65%\par - Degenerative posterior mitral leaflet with prolapsed and partially flailed P2 segment, there is calcific lesion immediately below the posterior leaflet. Severe eccentric mitral regurgitation noted. Jet is anteriorly directed with Coanda effect, systolic pulmonary veins reversal noted as well. Mean mitral valve gradient of 2.3 mmHg\par - Sclerotic aortic valve with normal opening, CHAS 2.59 cm2\par - Small pericardial effusion adjacent to the RV. \par

## 2023-06-16 NOTE — PROGRESS NOTE ADULT - SUBJECTIVE AND OBJECTIVE BOX
Brief summary:  68M, pmhx CAD s/p PCI of RCA 2/16/2021, HTN, HLD, DM, hyperthyroidism, hemochromatosis, neck surgery s/p PCDF & ACDF, appendectomy, hernia repair, R shoulder dislocation s/p repair, Hepatitis C Virus s/p treatment, known MR initially planned to be evaluated for mitral clip, recent admission in April for syncope secondary to orthostatic hypotension and BELLO in setting of dehydration, found to have strep salivarius/vestilbularis bacteremia, QUINCY with severe MR, inconclusive for endocarditis, pt tx with 4 weeks ceftriaxone, now s/p repeat QUINCY 6/12 to assess valve, found to have severe MR with multiple mobile echodensities. 6/14 underwent MVR with Dr. Islas    Overnight events:  Hypotensive overnight while sleeping.  Repeat CBC with HCT 25 - 1u PRBC given with appropriate response.  Patient denies acute pain with radiating or aggravating factors.  He denies chest pain, shortness of breath, palpitations, headache, dizziness, nausea, or vomiting.     PAST MEDICAL & SURGICAL HISTORY:  Lung nodule      Severe mitral regurgitation      Aortic stenosis      Chronic hepatitis C virus infection      Coronary artery disease of native artery of native heart with stable angina pectoris      Type 2 diabetes mellitus with other circulatory complication, with long-term current use of insulin      Primary hypertension      Mixed hyperlipidemia      Hemochromatosis      H/O neck surgery  PCDF 1992,  ACDF 1993      S/P appendectomy  1974,  right shoulder dislocation 1962      H/O hernia repair  1985      S/P spinal fusion      S/P shoulder surgery      Status post insertion of drug-eluting stent into right coronary artery for coronary artery disease      History of ERCP          Medications:  acetaminophen     Tablet .. 975 milliGRAM(s) Oral every 6 hours  aspirin enteric coated 325 milliGRAM(s) Oral daily  atorvastatin 80 milliGRAM(s) Oral at bedtime  cefuroxime  IVPB 1500 milliGRAM(s) IV Intermittent every 8 hours  chlorhexidine 2% Cloths 1 Application(s) Topical daily  dextrose Oral Gel 15 Gram(s) Oral once PRN  enoxaparin Injectable 40 milliGRAM(s) SubCutaneous every 24 hours  glucagon  Injectable 1 milliGRAM(s) IntraMuscular once  insulin glargine Injectable (LANTUS) 15 Unit(s) SubCutaneous at bedtime  insulin lispro (ADMELOG) corrective regimen sliding scale   SubCutaneous Before meals and at bedtime  insulin lispro Injectable (ADMELOG) 5 Unit(s) SubCutaneous three times a day before meals  ketorolac   Injectable 15 milliGRAM(s) IV Push every 8 hours  norepinephrine Infusion 0.05 MICROgram(s)/kG/Min IV Continuous <Continuous>  oxyCODONE    IR 5 milliGRAM(s) Oral every 4 hours PRN  pantoprazole    Tablet 40 milliGRAM(s) Oral daily  polyethylene glycol 3350 17 Gram(s) Oral daily  psyllium Powder 1 Packet(s) Oral two times a day  senna 2 Tablet(s) Oral at bedtime  sodium chloride 0.9%. 1000 milliLiter(s) IV Continuous <Continuous>  sodium chloride 0.9%. 1000 milliLiter(s) IV Continuous <Continuous>  vancomycin  IVPB 1000 milliGRAM(s) IV Intermittent every 12 hours      MEDICATIONS  (PRN):  dextrose Oral Gel 15 Gram(s) Oral once PRN Blood Glucose LESS THAN 70 milliGRAM(s)/deciliter  oxyCODONE    IR 5 milliGRAM(s) Oral every 4 hours PRN Moderate Pain (4 - 6)      Daily Review:      ABG - ( 14 Jun 2023 16:26 )  pH, Arterial: 7.490 pH, Blood: x     /  pCO2: 33    /  pO2: 172   / HCO3: 25    / Base Excess: 1.8   /  SaO2: 100.0                             9.5    15.92 )-----------( 205      ( 16 Jun 2023 02:15 )             27.8   06-16    135  |  102  |  21.5<H>  ----------------------------<  195<H>  3.8   |  17.0<L>  |  0.91    Ca    8.1<L>      16 Jun 2023 02:15  Mg     1.9     06-16    TPro  5.6<L>  /  Alb  3.4  /  TBili  1.6  /  DBili  0.4<H>  /  AST  56<H>  /  ALT  18  /  AlkPhos  59  06-15    CARDIAC MARKERS ( 15 Kelvin 2023 02:05 )  x     / 0.65 ng/mL / 506 U/L / x     / 22.8 ng/mL  CARDIAC MARKERS ( 14 Jun 2023 11:45 )  x     / 0.62 ng/mL / 322 U/L / x     / 37.0 ng/mL    PT/INR - ( 15 Kelvin 2023 02:05 )   PT: 16.2 sec;   INR: 1.39 ratio         PTT - ( 14 Jun 2023 11:45 )  PTT:30.8 sec    T(C): 36.8 (06-16-23 @ 01:00), Max: 38.4 (06-15-23 @ 19:00)  HR: 79 (06-16-23 @ 03:00) (72 - 83)  BP: 98/57 (06-16-23 @ 03:00) (81/53 - 131/62)  RR: 20 (06-16-23 @ 03:00) (10 - 32)  SpO2: 97% (06-16-23 @ 03:00) (94% - 99%)  Wt(kg): --    CAPILLARY BLOOD GLUCOSE      POCT Blood Glucose.: 148 mg/dL (15 Kelvin 2023 23:51)  POCT Blood Glucose.: 109 mg/dL (15 Kelvin 2023 20:55)  POCT Blood Glucose.: 83 mg/dL (15 Kelvin 2023 20:38)  POCT Blood Glucose.: 76 mg/dL (15 Kelvin 2023 20:24)  POCT Blood Glucose.: 88 mg/dL (15 Kelvin 2023 20:06)  POCT Blood Glucose.: 132 mg/dL (15 Kelvin 2023 18:09)  POCT Blood Glucose.: 154 mg/dL (15 Kelvin 2023 17:21)  POCT Blood Glucose.: 138 mg/dL (15 Kelvin 2023 16:36)  POCT Blood Glucose.: 202 mg/dL (15 Kelvin 2023 15:20)  POCT Blood Glucose.: 280 mg/dL (15 Kelvin 2023 13:41)  POCT Blood Glucose.: 216 mg/dL (15 Kelvin 2023 12:11)  POCT Blood Glucose.: 222 mg/dL (15 Kelvin 2023 11:16)  POCT Blood Glucose.: 257 mg/dL (15 Kelvin 2023 10:23)  POCT Blood Glucose.: 268 mg/dL (15 Kelvin 2023 09:22)  POCT Blood Glucose.: 150 mg/dL (15 Kelvin 2023 06:50)  POCT Blood Glucose.: 151 mg/dL (15 Kelvin 2023 05:27)  POCT Blood Glucose.: 136 mg/dL (15 Kelvin 2023 04:09)      I&O's Summary    14 Jun 2023 07:01  -  15 Kelvin 2023 07:00  --------------------------------------------------------  IN: 1348.9 mL / OUT: 2030 mL / NET: -681.1 mL    15 Kelvin 2023 07:01  -  16 Jun 2023 03:11  --------------------------------------------------------  IN: 1205 mL / OUT: 915 mL / NET: 290 mL        Physical Exam  Neuro: A+O x 3, non-focal, speech clear and intact  Pulm: coarse breath sounds bilaterally, no wheezing or rales  CV: RRR, no murmurs, +S1S2  Abd: soft, NT, ND  Ext: +DP Pulses b/l, +PT pulses, no edema  Inc: Mediastinal sternal incision C/D/I/stable w/ dressing  Chest tubes: mediastinal chest tubes

## 2023-06-16 NOTE — PROGRESS NOTE ADULT - SUBJECTIVE AND OBJECTIVE BOX
LYDIA PEACE  MRN-[id number]    HPI:  69 yo male with h/o CAD s/p PCI of RCA 2/16/2021, HTN, HLD, DM, HCV s/p treatment and cardiac murmur with MR. He saw Dr. Desouza for evaluation of moderate to severe MR and was referred to Dr. Islas for mitraClip evaluation. In April he had a syncopal episode and went to [facility] where he was admitted with syncope and endocarditis and completed 4 weeks of antibiotics as of April 12. He is being referred for QUINCY to evaluated mitral valve post endocarditis and if he is still a candidate for MitralClip. He does not report any SOB at this time but has not increased activity. + GUERIN and fatigue.  (10 Kelvin 2023 10:23)      Surgery/Hospital Course:  ·  PRE-OP DIAGNOSIS:  Mitral valve insufficiency  ·  POST-OP DIAGNOSIS:  Mitral valve insufficiency   ·  PROCEDURES:  MVR (mitral valve replacement) 14-Jun-2023   31mm Lyles 2    Today:  No acute events   chronic back pain  SR , RA  Endo consult appreciated  DC Lopressor 12.5 mg-  CVP 11  xfused 1 U PRBC    ICU Vital Signs Last 24 Hrs  T(C): 36.7 (16 Jun 2023 12:15), Max: 38.4 (15 Kelivn 2023 19:00)  T(F): 98 (16 Jun 2023 12:15), Max: 101.1 (15 Kelvin 2023 19:00)  HR: 78 (16 Jun 2023 12:15) (67 - 83)  BP: 115/68 (16 Jun 2023 12:15) (81/53 - 115/70)  BP(mean): 66 (16 Jun 2023 09:00) (63 - 81)  ABP: 118/80 (16 Jun 2023 05:00) (84/41 - 149/57)  ABP(mean): 83 (16 Jun 2023 05:00) (53 - 85)  RR: 17 (16 Jun 2023 09:32) (10 - 32)  SpO2: 98% (16 Jun 2023 12:15) (93% - 99%)    O2 Parameters below as of 16 Jun 2023 09:32  Patient On (Oxygen Delivery Method): room air            Physical Exam:  Gen: A&O   CNS: non focal 	  Neck: no JVD  RES : clear , no wheezing              CVS: Regular  rhythm. Normal S1/S2  Abd: Soft, non-distended. Bowel sounds present.  Skin: No rash.  Ext:  no edema    ============================I/O===========================   I&O's Detail    15 Kelvin 2023 07:01  -  16 Jun 2023 07:00  --------------------------------------------------------  IN:    Albumin 5%  - 250 mL: 500 mL    Insulin: 65 mL    IV PiggyBack: 100 mL    IV PiggyBack: 250 mL    IV PiggyBack: 100 mL    Norepinephrine: 40.5 mL    Oral Fluid: 720 mL    PRBCs (Packed Red Blood Cells): 339 mL    sodium chloride 0.9%: 240 mL    sodium chloride 0.9%: 120 mL  Total IN: 2474.5 mL    OUT:    Chest Tube (mL): 230 mL    Indwelling Catheter - Urethral (mL): 855 mL  Total OUT: 1085 mL    Total NET: 1389.5 mL      16 Jun 2023 07:01  -  16 Jun 2023 13:10  --------------------------------------------------------  IN:    sodium chloride 0.9%: 5 mL    sodium chloride 0.9%: 10 mL  Total IN: 15 mL    OUT:  Total OUT: 0 mL    Total NET: 15 mL        ============================ LABS =========================                        9.5    15.92 )-----------( 205      ( 16 Jun 2023 02:15 )             27.8     06-16    135  |  102  |  21.5<H>  ----------------------------<  195<H>  3.8   |  17.0<L>  |  0.91    Ca    8.1<L>      16 Jun 2023 02:15  Mg     1.9     06-16    TPro  5.6<L>  /  Alb  3.4  /  TBili  1.6  /  DBili  0.4<H>  /  AST  56<H>  /  ALT  18  /  AlkPhos  59  06-15    LIVER FUNCTIONS - ( 15 Kelvin 2023 02:05 )  Alb: 3.4 g/dL / Pro: 5.6 g/dL / ALK PHOS: 59 U/L / ALT: 18 U/L / AST: 56 U/L / GGT: x           PT/INR - ( 16 Jun 2023 03:40 )   PT: 18.2 sec;   INR: 1.56 ratio           ABG - ( 16 Jun 2023 05:16 )  pH, Arterial: 7.430 pH, Blood: x     /  pCO2: 26    /  pO2: 70    / HCO3: 17    / Base Excess: -7.0  /  SaO2: 97.2                ======================Micro/Rad/Cardio=================  Culture: Reviewed   CXR: Reviewed  Echo:Reviewed  ======================================================  PAST MEDICAL & SURGICAL HISTORY:  Lung nodule      Severe mitral regurgitation      Aortic stenosis      Chronic hepatitis C virus infection      Coronary artery disease of native artery of native heart with stable angina pectoris      Type 2 diabetes mellitus with other circulatory complication, with long-term current use of insulin      Primary hypertension      Mixed hyperlipidemia      Hemochromatosis      H/O neck surgery  PCDF 1992,  ACDF 1993      S/P appendectomy  1974,  right shoulder dislocation 1962      H/O hernia repair  1985      S/P spinal fusion      S/P shoulder surgery      Status post insertion of drug-eluting stent into right coronary artery for coronary artery disease      History of ERCP        ====================ASSESSMENT ==============  68M, pmhx CAD s/p PCI of RCA 2/16/2021, HTN, HLD, DM, hyperthyroidism, hemochromatosis, neck surgery s/p PCDF & ACDF, appendectomy, hernia repair, R shoulder dislocation s/p repair, HCV s/p treatment, known MR initially planned to be evaluated for mitral clip, recent admission in April for syncope secondary to orthostatic hypotension and BELLO in setting of dehydration, found to have strep salivarius/vestilbularis bacteremia, QUINCY with severe MR, inconclusive for endocarditis, pt tx with 4 weeks ceftriaxone, now s/p repeat QUINCY 6/12 to assess valve, found to have severe MR with multiple mobile echodensities. 6/14 underwent MVR with Dr. Islas      --- Severe mitral regurgitation.   ---s/o MVR  --- Type 2 diabetes mellitus with other circulatory complication, with long-term current use of insulin.   --- Mixed hyperlipidemia.   ---HCV (hepatitis C virus).   ---Post op Hypovolemia  ---Post op respiratory insufficiency       Plan:  -DC Beta blocker   -Aspirin for valve prophylaxis  -Chest PT and IS use with bedside nurse   - Continue high dose statin (continuing home dose atorvastatin 80mg).  -Lovenox and SCDs for DVT prophylaxis  -Protonix for GI prophylaxis-  ====================== NEUROLOGY=====================  acetaminophen     Tablet .. 975 milliGRAM(s) Oral every 6 hours  oxyCODONE    IR 5 milliGRAM(s) Oral every 4 hours PRN Moderate Pain (4 - 6)    ==================== RESPIRATORY======================  Post op respiratory insufficiency    ====================CARDIOVASCULAR==================  Post op Hypovolemia    ===================HEMATOLOGIC/ONC ===================  Monitor H&H/Plts    aspirin enteric coated 325 milliGRAM(s) Oral daily  enoxaparin Injectable 40 milliGRAM(s) SubCutaneous every 24 hours  warfarin 2.5 milliGRAM(s) Oral once    ===================== RENAL =========================  Continue monitoring urine output, I&OS, BUN/Cr     ==================== GASTROINTESTINAL===================  pantoprazole    Tablet 40 milliGRAM(s) Oral daily  polyethylene glycol 3350 17 Gram(s) Oral daily  psyllium Powder 1 Packet(s) Oral two times a day  senna 2 Tablet(s) Oral at bedtime  sodium chloride 0.9% lock flush 3 milliLiter(s) IV Push every 8 hours  sodium chloride 0.9%. 1000 milliLiter(s) (10 mL/Hr) IV Continuous <Continuous>  sodium chloride 0.9%. 1000 milliLiter(s) (5 mL/Hr) IV Continuous <Continuous>    =======================    ENDOCRINE  =====================  atorvastatin 80 milliGRAM(s) Oral at bedtime  dextrose Oral Gel 15 Gram(s) Oral once PRN Blood Glucose LESS THAN 70 milliGRAM(s)/deciliter  glucagon  Injectable 1 milliGRAM(s) IntraMuscular once  insulin glargine Injectable (LANTUS) 15 Unit(s) SubCutaneous at bedtime  insulin lispro (ADMELOG) corrective regimen sliding scale   SubCutaneous Before meals and at bedtime  insulin lispro Injectable (ADMELOG) 5 Unit(s) SubCutaneous three times a day before meals  methimazole 5 milliGRAM(s) Oral daily    ========================INFECTIOUS DISEASE================      -Monitor Neurologic status ,   -Head of the bed should remain elevated to 45 degrees,  -Monitor for arrhythmias and monitor parameters for organ perfusion,  -Glycemic control is satisfactory,  -Nutritional goals will be met using po eventually , insure adequate caloric intake and monitor the same ,  -Electrolytes have been repleted as necessary , pain control has been achieved  and wound care has been carried out ,  -Stress ulcer and VTE prophylaxis will be achieved,  -Agressive PT and early mobility and ambulation goals will be met,    I have spent 35 minutes providing acute care for this critically ill patient     Patient requires continuous monitoring with bedside rhythm monitoring, pulse ox monitoring, and intermittent blood gas analysis. Care plan discussed with ICU care team. Patient remained critical and at risk for life threatening decompensation.

## 2023-06-17 LAB
ALBUMIN SERPL ELPH-MCNC: 3.3 G/DL — SIGNIFICANT CHANGE UP (ref 3.3–5.2)
ALP SERPL-CCNC: 61 U/L — SIGNIFICANT CHANGE UP (ref 40–120)
ALT FLD-CCNC: 21 U/L — SIGNIFICANT CHANGE UP
ANION GAP SERPL CALC-SCNC: 14 MMOL/L — SIGNIFICANT CHANGE UP (ref 5–17)
ANION GAP SERPL CALC-SCNC: 14 MMOL/L — SIGNIFICANT CHANGE UP (ref 5–17)
AST SERPL-CCNC: 33 U/L — SIGNIFICANT CHANGE UP
BILIRUB DIRECT SERPL-MCNC: 0.5 MG/DL — HIGH (ref 0–0.3)
BILIRUB INDIRECT FLD-MCNC: 1.3 MG/DL — HIGH (ref 0.2–1)
BILIRUB SERPL-MCNC: 1.8 MG/DL — SIGNIFICANT CHANGE UP (ref 0.4–2)
BUN SERPL-MCNC: 29.4 MG/DL — HIGH (ref 8–20)
BUN SERPL-MCNC: 34.4 MG/DL — HIGH (ref 8–20)
CALCIUM SERPL-MCNC: 7.8 MG/DL — LOW (ref 8.4–10.5)
CALCIUM SERPL-MCNC: 8 MG/DL — LOW (ref 8.4–10.5)
CHLORIDE SERPL-SCNC: 100 MMOL/L — SIGNIFICANT CHANGE UP (ref 96–108)
CHLORIDE SERPL-SCNC: 97 MMOL/L — SIGNIFICANT CHANGE UP (ref 96–108)
CO2 SERPL-SCNC: 15 MMOL/L — LOW (ref 22–29)
CO2 SERPL-SCNC: 19 MMOL/L — LOW (ref 22–29)
CREAT SERPL-MCNC: 0.94 MG/DL — SIGNIFICANT CHANGE UP (ref 0.5–1.3)
CREAT SERPL-MCNC: 1.19 MG/DL — SIGNIFICANT CHANGE UP (ref 0.5–1.3)
EGFR: 67 ML/MIN/1.73M2 — SIGNIFICANT CHANGE UP
EGFR: 88 ML/MIN/1.73M2 — SIGNIFICANT CHANGE UP
GAS PNL BLDA: SIGNIFICANT CHANGE UP
GLUCOSE BLDC GLUCOMTR-MCNC: 121 MG/DL — HIGH (ref 70–99)
GLUCOSE BLDC GLUCOMTR-MCNC: 134 MG/DL — HIGH (ref 70–99)
GLUCOSE BLDC GLUCOMTR-MCNC: 142 MG/DL — HIGH (ref 70–99)
GLUCOSE BLDC GLUCOMTR-MCNC: 153 MG/DL — HIGH (ref 70–99)
GLUCOSE SERPL-MCNC: 139 MG/DL — HIGH (ref 70–99)
GLUCOSE SERPL-MCNC: 147 MG/DL — HIGH (ref 70–99)
HCT VFR BLD CALC: 33 % — LOW (ref 39–50)
HCT VFR BLD CALC: 35.2 % — LOW (ref 39–50)
HGB BLD-MCNC: 11.3 G/DL — LOW (ref 13–17)
HGB BLD-MCNC: 11.9 G/DL — LOW (ref 13–17)
INR BLD: 1.54 RATIO — HIGH (ref 0.88–1.16)
MAGNESIUM SERPL-MCNC: 2.4 MG/DL — SIGNIFICANT CHANGE UP (ref 1.6–2.6)
MAGNESIUM SERPL-MCNC: 2.6 MG/DL — SIGNIFICANT CHANGE UP (ref 1.6–2.6)
MCHC RBC-ENTMCNC: 30.2 PG — SIGNIFICANT CHANGE UP (ref 27–34)
MCHC RBC-ENTMCNC: 30.3 PG — SIGNIFICANT CHANGE UP (ref 27–34)
MCHC RBC-ENTMCNC: 33.8 GM/DL — SIGNIFICANT CHANGE UP (ref 32–36)
MCHC RBC-ENTMCNC: 34.2 GM/DL — SIGNIFICANT CHANGE UP (ref 32–36)
MCV RBC AUTO: 88.2 FL — SIGNIFICANT CHANGE UP (ref 80–100)
MCV RBC AUTO: 89.6 FL — SIGNIFICANT CHANGE UP (ref 80–100)
PLATELET # BLD AUTO: 155 K/UL — SIGNIFICANT CHANGE UP (ref 150–400)
PLATELET # BLD AUTO: 159 K/UL — SIGNIFICANT CHANGE UP (ref 150–400)
POTASSIUM SERPL-MCNC: 3.9 MMOL/L — SIGNIFICANT CHANGE UP (ref 3.5–5.3)
POTASSIUM SERPL-MCNC: 4 MMOL/L — SIGNIFICANT CHANGE UP (ref 3.5–5.3)
POTASSIUM SERPL-SCNC: 3.9 MMOL/L — SIGNIFICANT CHANGE UP (ref 3.5–5.3)
POTASSIUM SERPL-SCNC: 4 MMOL/L — SIGNIFICANT CHANGE UP (ref 3.5–5.3)
PROT SERPL-MCNC: 5.9 G/DL — LOW (ref 6.6–8.7)
PROTHROM AB SERPL-ACNC: 18 SEC — HIGH (ref 10.5–13.4)
RBC # BLD: 3.74 M/UL — LOW (ref 4.2–5.8)
RBC # BLD: 3.93 M/UL — LOW (ref 4.2–5.8)
RBC # FLD: 14.4 % — SIGNIFICANT CHANGE UP (ref 10.3–14.5)
RBC # FLD: 14.6 % — HIGH (ref 10.3–14.5)
SODIUM SERPL-SCNC: 126 MMOL/L — LOW (ref 135–145)
SODIUM SERPL-SCNC: 133 MMOL/L — LOW (ref 135–145)
WBC # BLD: 12.75 K/UL — HIGH (ref 3.8–10.5)
WBC # BLD: 13.04 K/UL — HIGH (ref 3.8–10.5)
WBC # FLD AUTO: 12.75 K/UL — HIGH (ref 3.8–10.5)
WBC # FLD AUTO: 13.04 K/UL — HIGH (ref 3.8–10.5)

## 2023-06-17 PROCEDURE — 99024 POSTOP FOLLOW-UP VISIT: CPT

## 2023-06-17 PROCEDURE — 71045 X-RAY EXAM CHEST 1 VIEW: CPT | Mod: 26,76

## 2023-06-17 RX ORDER — WARFARIN SODIUM 2.5 MG/1
2.5 TABLET ORAL ONCE
Refills: 0 | Status: DISCONTINUED | OUTPATIENT
Start: 2023-06-17 | End: 2023-06-17

## 2023-06-17 RX ORDER — LACTULOSE 10 G/15ML
20 SOLUTION ORAL EVERY 6 HOURS
Refills: 0 | Status: COMPLETED | OUTPATIENT
Start: 2023-06-17 | End: 2023-06-18

## 2023-06-17 RX ORDER — OXYCODONE HYDROCHLORIDE 5 MG/1
10 TABLET ORAL EVERY 4 HOURS
Refills: 0 | Status: DISCONTINUED | OUTPATIENT
Start: 2023-06-17 | End: 2023-06-21

## 2023-06-17 RX ORDER — FUROSEMIDE 40 MG
20 TABLET ORAL ONCE
Refills: 0 | Status: COMPLETED | OUTPATIENT
Start: 2023-06-17 | End: 2023-06-17

## 2023-06-17 RX ORDER — OXYCODONE HYDROCHLORIDE 5 MG/1
5 TABLET ORAL ONCE
Refills: 0 | Status: DISCONTINUED | OUTPATIENT
Start: 2023-06-17 | End: 2023-06-17

## 2023-06-17 RX ORDER — LIDOCAINE 4 G/100G
1 CREAM TOPICAL EVERY 24 HOURS
Refills: 0 | Status: DISCONTINUED | OUTPATIENT
Start: 2023-06-17 | End: 2023-06-21

## 2023-06-17 RX ORDER — MIDODRINE HYDROCHLORIDE 2.5 MG/1
10 TABLET ORAL THREE TIMES A DAY
Refills: 0 | Status: DISCONTINUED | OUTPATIENT
Start: 2023-06-17 | End: 2023-06-17

## 2023-06-17 RX ORDER — WARFARIN SODIUM 2.5 MG/1
2.5 TABLET ORAL ONCE
Refills: 0 | Status: COMPLETED | OUTPATIENT
Start: 2023-06-17 | End: 2023-06-17

## 2023-06-17 RX ADMIN — Medication 5 UNIT(S): at 08:30

## 2023-06-17 RX ADMIN — OXYCODONE HYDROCHLORIDE 5 MILLIGRAM(S): 5 TABLET ORAL at 10:01

## 2023-06-17 RX ADMIN — Medication 5 UNIT(S): at 17:24

## 2023-06-17 RX ADMIN — SODIUM CHLORIDE 3 MILLILITER(S): 9 INJECTION INTRAMUSCULAR; INTRAVENOUS; SUBCUTANEOUS at 21:18

## 2023-06-17 RX ADMIN — INSULIN GLARGINE 15 UNIT(S): 100 INJECTION, SOLUTION SUBCUTANEOUS at 21:40

## 2023-06-17 RX ADMIN — OXYCODONE HYDROCHLORIDE 5 MILLIGRAM(S): 5 TABLET ORAL at 06:28

## 2023-06-17 RX ADMIN — POLYETHYLENE GLYCOL 3350 17 GRAM(S): 17 POWDER, FOR SOLUTION ORAL at 10:02

## 2023-06-17 RX ADMIN — LIDOCAINE 1 PATCH: 4 CREAM TOPICAL at 18:13

## 2023-06-17 RX ADMIN — Medication 5 UNIT(S): at 12:23

## 2023-06-17 RX ADMIN — OXYCODONE HYDROCHLORIDE 5 MILLIGRAM(S): 5 TABLET ORAL at 05:58

## 2023-06-17 RX ADMIN — PANTOPRAZOLE SODIUM 40 MILLIGRAM(S): 20 TABLET, DELAYED RELEASE ORAL at 10:01

## 2023-06-17 RX ADMIN — WARFARIN SODIUM 2.5 MILLIGRAM(S): 2.5 TABLET ORAL at 21:39

## 2023-06-17 RX ADMIN — Medication 975 MILLIGRAM(S): at 12:23

## 2023-06-17 RX ADMIN — Medication 20 MILLIGRAM(S): at 18:11

## 2023-06-17 RX ADMIN — Medication 975 MILLIGRAM(S): at 13:20

## 2023-06-17 RX ADMIN — OXYCODONE HYDROCHLORIDE 5 MILLIGRAM(S): 5 TABLET ORAL at 14:00

## 2023-06-17 RX ADMIN — ATORVASTATIN CALCIUM 80 MILLIGRAM(S): 80 TABLET, FILM COATED ORAL at 21:39

## 2023-06-17 RX ADMIN — LACTULOSE 20 GRAM(S): 10 SOLUTION ORAL at 16:32

## 2023-06-17 RX ADMIN — CHLORHEXIDINE GLUCONATE 1 APPLICATION(S): 213 SOLUTION TOPICAL at 10:03

## 2023-06-17 RX ADMIN — Medication 1 PACKET(S): at 05:59

## 2023-06-17 RX ADMIN — OXYCODONE HYDROCHLORIDE 5 MILLIGRAM(S): 5 TABLET ORAL at 10:56

## 2023-06-17 RX ADMIN — SENNA PLUS 2 TABLET(S): 8.6 TABLET ORAL at 21:39

## 2023-06-17 RX ADMIN — Medication 2: at 08:30

## 2023-06-17 RX ADMIN — Medication 20 MILLIGRAM(S): at 04:24

## 2023-06-17 RX ADMIN — OXYCODONE HYDROCHLORIDE 5 MILLIGRAM(S): 5 TABLET ORAL at 13:06

## 2023-06-17 RX ADMIN — SODIUM CHLORIDE 3 MILLILITER(S): 9 INJECTION INTRAMUSCULAR; INTRAVENOUS; SUBCUTANEOUS at 13:12

## 2023-06-17 RX ADMIN — SODIUM CHLORIDE 3 MILLILITER(S): 9 INJECTION INTRAMUSCULAR; INTRAVENOUS; SUBCUTANEOUS at 05:52

## 2023-06-17 RX ADMIN — Medication 975 MILLIGRAM(S): at 17:23

## 2023-06-17 RX ADMIN — Medication 975 MILLIGRAM(S): at 17:53

## 2023-06-17 NOTE — PROGRESS NOTE ADULT - SUBJECTIVE AND OBJECTIVE BOX
POD #3 s/p Mitral Valve Replacement    PAST MEDICAL & SURGICAL HISTORY:  Lung nodule  Severe mitral regurgitation  Aortic stenosis  Chronic hepatitis C virus infection  Coronary artery disease of native artery of native heart with stable angina pectoris  Type 2 diabetes mellitus with other circulatory complication, with long-term current use of insulin  Primary hypertension  Mixed hyperlipidemia  Hemochromatosis  H/O neck surgery PCDF 1992,  ACDF 1993  S/P appendectomy 1974, right shoulder dislocation 1962  H/O hernia repair 1985  S/P spinal fusion  S/P shoulder surgery  Status post insertion of drug-eluting stent into right coronary artery for coronary artery disease  History of ERCP    FAMILY HISTORY:  Family history of diabetes mellitus  Family history of diabetes mellitus    Brief Hospital Course: 68M, pmhx CAD s/p PCI of RCA 2/16/2021, HTN, HLD, DM, hyperthyroidism, hemochromatosis, neck surgery s/p PCDF & ACDF, appendectomy, hernia repair, Right shoulder dislocation s/p repair, Hepatitis C virus s/p treatment, known MR initially planned to be evaluated for mitral clip, recent admission in April for syncope secondary to orthostatic hypotension and BELLO in setting of dehydration, found to have strep salivarius/vestilbularis bacteremia, QUINCY with severe MR, inconclusive for endocarditis, pt tx with 4 weeks ceftriaxone, now s/p repeat QUINCY 6/12 to assess valve, found to have severe MR with multiple mobile echodensities. 6/14 underwent MVR with Dr. Islas. Postop course significant for ABLA, hypotension, and BELLO.    Significant recent/past 24 hr events: No overnight events reported.    Subjective: Patient lying in bed in NAD. +Tolerating diet. +Passing gas. +Pain currently controlled. Denies fevers, chills, lightheadedness, dizziness, HA, CP, palpitations, SOB, cough, abdominal pain, N/V, diarrhea, numbness/tingling in extremities, or any other acute complaints.  ROS negative x 10 systems except as noted above.    MEDICATIONS  (STANDING):  acetaminophen     Tablet .. 975 milliGRAM(s) Oral every 6 hours  atorvastatin 80 milliGRAM(s) Oral at bedtime  chlorhexidine 2% Cloths 1 Application(s) Topical daily  insulin glargine Injectable (LANTUS) 15 Unit(s) SubCutaneous at bedtime  insulin lispro (ADMELOG) corrective regimen sliding scale   SubCutaneous Before meals and at bedtime  insulin lispro Injectable (ADMELOG) 5 Unit(s) SubCutaneous three times a day before meals  methimazole 5 milliGRAM(s) Oral daily  pantoprazole    Tablet 40 milliGRAM(s) Oral daily  polyethylene glycol 3350 17 Gram(s) Oral daily  psyllium Powder 1 Packet(s) Oral two times a day  senna 2 Tablet(s) Oral at bedtime  sodium chloride 0.9% lock flush 3 milliLiter(s) IV Push every 8 hours    MEDICATIONS  (PRN):  dextrose Oral Gel 15 Gram(s) Oral once PRN Blood Glucose LESS THAN 70 milliGRAM(s)/deciliter  oxyCODONE    IR 5 milliGRAM(s) Oral every 4 hours PRN Moderate Pain (4 - 6)    Allergies: No Known Allergies    Vitals   T(C): 37.2 (17 Jun 2023 01:05), Max: 37.5 (16 Jun 2023 05:00)  T(F): 98.9 (17 Jun 2023 01:05), Max: 99.5 (16 Jun 2023 05:00)  HR: 66 (17 Jun 2023 01:05) (65 - 79)  BP: 96/60 (17 Jun 2023 01:05) (86/53 - 115/70)  BP(mean): 72 (17 Jun 2023 01:05) (64 - 81)  ABP: 118/80 (16 Jun 2023 05:00) (100/44 - 118/80)  ABP(mean): 83 (16 Jun 2023 05:00) (61 - 83)  RR: 19 (17 Jun 2023 01:05) (11 - 27)  SpO2: 97% (17 Jun 2023 01:05) (93% - 98%)  O2 Parameters below as of 17 Jun 2023 01:05  Patient On (Oxygen Delivery Method): nasal cannula  O2 Flow (L/min): 2    I&O's Detail    15 Kelvin 2023 07:01  -  16 Jun 2023 07:00  --------------------------------------------------------  IN:    Albumin 5%  - 250 mL: 500 mL    Insulin: 65 mL    IV PiggyBack: 100 mL    IV PiggyBack: 250 mL    IV PiggyBack: 100 mL    Norepinephrine: 40.5 mL    Oral Fluid: 720 mL    PRBCs (Packed Red Blood Cells): 339 mL    sodium chloride 0.9%: 240 mL    sodium chloride 0.9%: 120 mL  Total IN: 2474.5 mL    OUT:    Chest Tube (mL): 230 mL    Indwelling Catheter - Urethral (mL): 855 mL  Total OUT: 1085 mL    Total NET: 1389.5 mL      16 Jun 2023 07:01  -  17 Jun 2023 01:46  --------------------------------------------------------  IN:    Oral Fluid: 100 mL    sodium chloride 0.9%: 5 mL    sodium chloride 0.9%: 10 mL  Total IN: 115 mL    OUT:    Voided (mL): 190 mL  Total OUT: 190 mL    Total NET: -75 mL    Physical Exam  Neuro: A+O x 3, non-focal, speech clear and intact  HEENT:  NCAT, No conjuctival edema or icterus, no thrush.    Neck:  Supple, trachea midline  Pulm: Coarse BSs bilaterally, no accessory muscle use noted  CV: regular rate, regular rhythm, +S1S2, no murmur or rub noted  Abd: soft, NT, ND, + BS  Ext: RICO x 4, no edema, no cyanosis, distal motor/neuro/circ intact  Skin: warm, dry, perfused  Incisions: midsternal mepilex C/D/I, sternum stable    LABS                        9.9    12.53 )-----------( 143      ( 16 Jun 2023 21:43 )             28.8     06-16    134<L>  |  104  |  36.4<H>  ----------------------------<  156<H>  4.1   |  17.0<L>  |  1.35<H>    Ca    7.8<L>      16 Jun 2023 21:43  Mg     3.0     06-16    TPro  5.6<L>  /  Alb  3.4  /  TBili  1.6  /  DBili  0.4<H>  /  AST  56<H>  /  ALT  18  /  AlkPhos  59  06-15    PT/INR - ( 16 Jun 2023 14:49 )   PT: 18.3 sec;   INR: 1.57 ratio      POCT Blood Glucose.: 136 mg/dL (06-16-23 @ 22:15)  POCT Blood Glucose.: 150 mg/dL (06-16-23 @ 21:04)  POCT Blood Glucose.: 142 mg/dL (06-16-23 @ 17:10)  POCT Blood Glucose.: 210 mg/dL (06-16-23 @ 12:15)  POCT Blood Glucose.: 172 mg/dL (06-16-23 @ 07:50)      Last CXR:  < from: Xray Chest 1 View- PORTABLE-Routine (06.15.23 @ 06:02) >  FINDINGS:  Single frontal view of the chest demonstrates right basilar atelectasis. The cardiomediastinal silhouette is enlarged. No acute osseous abnormalities. Overlying EKG leads and wires are noted. Cervical spinal fusion hardware. Right-sided central venous catheter is unchanged. NG tube has been removed. Mediastinal chest tubes remain. Mediastinal sternotomy wires.  IMPRESSION: Status post NG tube removal.  < end of copied text >

## 2023-06-17 NOTE — PROGRESS NOTE ADULT - PROBLEM SELECTOR PLAN 1
S/p MV Replacement 6/14/23 with Dr. Islas.  Neurologically intact.  On Coumadin daily for MVR.  Beta blocker held 6/15 in setting of hypotension, reassess restarting daily.  S/p 2u PRBC give 6/16 and 1u PRBC given now for hypotension, low UO, new BELLO on labs with persistent ABLA.   Oxygenating well, coughing and deep breathing exercises/incentive spirometry encouraged.  Continue to monitor need for diuresis, monitor strict I/Os, replenish electrolytes PRN to keep K>4 and Mg>2.   Continue with PT, increase activity as tolerated.  FS AC+HS with coverage for glycemic control.  Tylenol, Oxy PRN for analgesia.  Continue bowel regimen PRN constipation.   Case and plan to be reviewed / discussed with CT Surgery attending / team during AM rounds.=

## 2023-06-18 ENCOUNTER — TRANSCRIPTION ENCOUNTER (OUTPATIENT)
Age: 68
End: 2023-06-18

## 2023-06-18 LAB
ALBUMIN SERPL ELPH-MCNC: 3.4 G/DL — SIGNIFICANT CHANGE UP (ref 3.3–5.2)
ALP SERPL-CCNC: 69 U/L — SIGNIFICANT CHANGE UP (ref 40–120)
ALT FLD-CCNC: 22 U/L — SIGNIFICANT CHANGE UP
ANION GAP SERPL CALC-SCNC: 11 MMOL/L — SIGNIFICANT CHANGE UP (ref 5–17)
AST SERPL-CCNC: 30 U/L — SIGNIFICANT CHANGE UP
BILIRUB DIRECT SERPL-MCNC: 0.5 MG/DL — HIGH (ref 0–0.3)
BILIRUB INDIRECT FLD-MCNC: 1.3 MG/DL — HIGH (ref 0.2–1)
BILIRUB SERPL-MCNC: 1.7 MG/DL — SIGNIFICANT CHANGE UP (ref 0.4–2)
BUN SERPL-MCNC: 21.4 MG/DL — HIGH (ref 8–20)
CALCIUM SERPL-MCNC: 8.1 MG/DL — LOW (ref 8.4–10.5)
CHLORIDE SERPL-SCNC: 104 MMOL/L — SIGNIFICANT CHANGE UP (ref 96–108)
CO2 SERPL-SCNC: 22 MMOL/L — SIGNIFICANT CHANGE UP (ref 22–29)
CREAT SERPL-MCNC: 0.84 MG/DL — SIGNIFICANT CHANGE UP (ref 0.5–1.3)
EGFR: 95 ML/MIN/1.73M2 — SIGNIFICANT CHANGE UP
GLUCOSE BLDC GLUCOMTR-MCNC: 105 MG/DL — HIGH (ref 70–99)
GLUCOSE BLDC GLUCOMTR-MCNC: 122 MG/DL — HIGH (ref 70–99)
GLUCOSE BLDC GLUCOMTR-MCNC: 127 MG/DL — HIGH (ref 70–99)
GLUCOSE BLDC GLUCOMTR-MCNC: 154 MG/DL — HIGH (ref 70–99)
GLUCOSE SERPL-MCNC: 122 MG/DL — HIGH (ref 70–99)
HCT VFR BLD CALC: 35.8 % — LOW (ref 39–50)
HGB BLD-MCNC: 12.1 G/DL — LOW (ref 13–17)
INR BLD: 1.69 RATIO — HIGH (ref 0.88–1.16)
MAGNESIUM SERPL-MCNC: 2.4 MG/DL — SIGNIFICANT CHANGE UP (ref 1.6–2.6)
MCHC RBC-ENTMCNC: 29.9 PG — SIGNIFICANT CHANGE UP (ref 27–34)
MCHC RBC-ENTMCNC: 33.8 GM/DL — SIGNIFICANT CHANGE UP (ref 32–36)
MCV RBC AUTO: 88.4 FL — SIGNIFICANT CHANGE UP (ref 80–100)
PLATELET # BLD AUTO: 210 K/UL — SIGNIFICANT CHANGE UP (ref 150–400)
POTASSIUM SERPL-MCNC: 4.3 MMOL/L — SIGNIFICANT CHANGE UP (ref 3.5–5.3)
POTASSIUM SERPL-SCNC: 4.3 MMOL/L — SIGNIFICANT CHANGE UP (ref 3.5–5.3)
PROT SERPL-MCNC: 6.1 G/DL — LOW (ref 6.6–8.7)
PROTHROM AB SERPL-ACNC: 19.7 SEC — HIGH (ref 10.5–13.4)
RBC # BLD: 4.05 M/UL — LOW (ref 4.2–5.8)
RBC # FLD: 14.6 % — HIGH (ref 10.3–14.5)
SODIUM SERPL-SCNC: 137 MMOL/L — SIGNIFICANT CHANGE UP (ref 135–145)
WBC # BLD: 12.48 K/UL — HIGH (ref 3.8–10.5)
WBC # FLD AUTO: 12.48 K/UL — HIGH (ref 3.8–10.5)

## 2023-06-18 PROCEDURE — 99024 POSTOP FOLLOW-UP VISIT: CPT

## 2023-06-18 PROCEDURE — 71045 X-RAY EXAM CHEST 1 VIEW: CPT | Mod: 26

## 2023-06-18 PROCEDURE — 99232 SBSQ HOSP IP/OBS MODERATE 35: CPT

## 2023-06-18 RX ORDER — METOPROLOL TARTRATE 50 MG
12.5 TABLET ORAL
Refills: 0 | Status: DISCONTINUED | OUTPATIENT
Start: 2023-06-18 | End: 2023-06-21

## 2023-06-18 RX ORDER — WARFARIN SODIUM 2.5 MG/1
2.5 TABLET ORAL ONCE
Refills: 0 | Status: COMPLETED | OUTPATIENT
Start: 2023-06-18 | End: 2023-06-18

## 2023-06-18 RX ORDER — FUROSEMIDE 40 MG
40 TABLET ORAL DAILY
Refills: 0 | Status: DISCONTINUED | OUTPATIENT
Start: 2023-06-18 | End: 2023-06-19

## 2023-06-18 RX ADMIN — INSULIN GLARGINE 15 UNIT(S): 100 INJECTION, SOLUTION SUBCUTANEOUS at 21:14

## 2023-06-18 RX ADMIN — Medication 12.5 MILLIGRAM(S): at 21:12

## 2023-06-18 RX ADMIN — ATORVASTATIN CALCIUM 80 MILLIGRAM(S): 80 TABLET, FILM COATED ORAL at 21:10

## 2023-06-18 RX ADMIN — CHLORHEXIDINE GLUCONATE 1 APPLICATION(S): 213 SOLUTION TOPICAL at 12:25

## 2023-06-18 RX ADMIN — Medication 975 MILLIGRAM(S): at 05:22

## 2023-06-18 RX ADMIN — OXYCODONE HYDROCHLORIDE 10 MILLIGRAM(S): 5 TABLET ORAL at 07:15

## 2023-06-18 RX ADMIN — Medication 2: at 17:12

## 2023-06-18 RX ADMIN — OXYCODONE HYDROCHLORIDE 10 MILLIGRAM(S): 5 TABLET ORAL at 13:04

## 2023-06-18 RX ADMIN — PANTOPRAZOLE SODIUM 40 MILLIGRAM(S): 20 TABLET, DELAYED RELEASE ORAL at 08:26

## 2023-06-18 RX ADMIN — WARFARIN SODIUM 2.5 MILLIGRAM(S): 2.5 TABLET ORAL at 21:11

## 2023-06-18 RX ADMIN — LIDOCAINE 1 PATCH: 4 CREAM TOPICAL at 06:47

## 2023-06-18 RX ADMIN — SENNA PLUS 2 TABLET(S): 8.6 TABLET ORAL at 21:10

## 2023-06-18 RX ADMIN — OXYCODONE HYDROCHLORIDE 10 MILLIGRAM(S): 5 TABLET ORAL at 14:51

## 2023-06-18 RX ADMIN — LIDOCAINE 1 PATCH: 4 CREAM TOPICAL at 22:00

## 2023-06-18 RX ADMIN — Medication 975 MILLIGRAM(S): at 00:00

## 2023-06-18 RX ADMIN — SODIUM CHLORIDE 3 MILLILITER(S): 9 INJECTION INTRAMUSCULAR; INTRAVENOUS; SUBCUTANEOUS at 05:21

## 2023-06-18 RX ADMIN — OXYCODONE HYDROCHLORIDE 10 MILLIGRAM(S): 5 TABLET ORAL at 06:40

## 2023-06-18 RX ADMIN — Medication 5 UNIT(S): at 08:24

## 2023-06-18 RX ADMIN — Medication 5 UNIT(S): at 17:12

## 2023-06-18 RX ADMIN — Medication 40 MILLIGRAM(S): at 08:24

## 2023-06-18 RX ADMIN — Medication 5 UNIT(S): at 12:24

## 2023-06-18 RX ADMIN — Medication 975 MILLIGRAM(S): at 12:21

## 2023-06-18 RX ADMIN — SODIUM CHLORIDE 3 MILLILITER(S): 9 INJECTION INTRAMUSCULAR; INTRAVENOUS; SUBCUTANEOUS at 21:18

## 2023-06-18 RX ADMIN — SODIUM CHLORIDE 3 MILLILITER(S): 9 INJECTION INTRAMUSCULAR; INTRAVENOUS; SUBCUTANEOUS at 14:51

## 2023-06-18 RX ADMIN — OXYCODONE HYDROCHLORIDE 10 MILLIGRAM(S): 5 TABLET ORAL at 21:10

## 2023-06-18 RX ADMIN — Medication 1 PACKET(S): at 05:24

## 2023-06-18 RX ADMIN — Medication 975 MILLIGRAM(S): at 05:00

## 2023-06-18 RX ADMIN — Medication 975 MILLIGRAM(S): at 16:31

## 2023-06-18 RX ADMIN — Medication 12.5 MILLIGRAM(S): at 12:21

## 2023-06-18 RX ADMIN — Medication 975 MILLIGRAM(S): at 17:34

## 2023-06-18 RX ADMIN — Medication 975 MILLIGRAM(S): at 14:51

## 2023-06-18 RX ADMIN — OXYCODONE HYDROCHLORIDE 10 MILLIGRAM(S): 5 TABLET ORAL at 22:10

## 2023-06-18 NOTE — DISCHARGE NOTE PROVIDER - NSDCACTIVITY_GEN_ALL_CORE
Do not drive or operate machinery/Do not make important decisions/Stairs allowed/Walking - Indoors allowed/No heavy lifting/straining/Walking - Outdoors allowed/Follow Instructions Provided by your Surgical Team Do not drive or operate machinery/Showering allowed/Do not make important decisions/Stairs allowed/Walking - Indoors allowed/No heavy lifting/straining/Walking - Outdoors allowed/Follow Instructions Provided by your Surgical Team

## 2023-06-18 NOTE — PROGRESS NOTE ADULT - PROBLEM SELECTOR PLAN 1
S/p MV Replacement 6/14/23 with Dr. Islas.  Neurologically intact.  On Coumadin daily for MVR. Follow up INR daily to dose Coumadin.   Beta blocker held 6/15 in setting of hypotension, reassess restarting daily.  S/p 2u PRBC give 6/16 and 1u PRBC given 6/17 for hypotension, low UO, AKl, and ABLA, now resolved.    Remains hemodynamically stable. Monitor H/H.   Oxygenating well, coughing and deep breathing exercises/incentive spirometry encouraged.  Continue to monitor need for diuresis, monitor strict I/Os, replenish electrolytes PRN to keep K>4 and Mg>2.   Continue with PT, increase activity as tolerated.  FS AC+HS with coverage for glycemic control.  Tylenol, Oxy PRN for analgesia.  Continue bowel regimen PRN constipation.   Case and plan to be reviewed / discussed with CT Surgery attending / team during AM rounds.=

## 2023-06-18 NOTE — DISCHARGE NOTE PROVIDER - CARE PROVIDER_API CALL
Wellington Islas  Thoracic and Cardiac Surgery  [address] [place] [address]  Phone: [phone]  Fax: [phone]  Follow Up Time:     Adam Desouza  Interventional Cardiology  [address] [place] [address]  Phone: [phone]  Fax: [phone]  Follow Up Time:     Jt Nelson  Gastroenterology  [facility], [address]  [place] [address]  Phone: [phone]  Fax: [phone]  Follow Up Time:

## 2023-06-18 NOTE — DISCHARGE NOTE PROVIDER - NSDCFUSCHEDAPPT_GEN_ALL_CORE_FT
COCO PETERSEN  [facility]  [address]THALM [address]  Scheduled Appointment: 07/18/2023

## 2023-06-18 NOTE — PROGRESS NOTE ADULT - SUBJECTIVE AND OBJECTIVE BOX
INTERVAL EVENTS:  Follow up diabetes management    ROS: Denies chest pain, sob, abd pain.     MEDICATIONS  (STANDING):  acetaminophen     Tablet .. 975 milliGRAM(s) Oral every 6 hours  aspirin enteric coated 325 milliGRAM(s) Oral daily  atorvastatin 80 milliGRAM(s) Oral at bedtime  chlorhexidine 2% Cloths 1 Application(s) Topical daily  enoxaparin Injectable 40 milliGRAM(s) SubCutaneous every 24 hours  glucagon  Injectable 1 milliGRAM(s) IntraMuscular once  insulin glargine Injectable (LANTUS) 15 Unit(s) SubCutaneous at bedtime  insulin lispro (ADMELOG) corrective regimen sliding scale   SubCutaneous Before meals and at bedtime  insulin lispro Injectable (ADMELOG) 5 Unit(s) SubCutaneous three times a day before meals  methimazole 5 milliGRAM(s) Oral daily  pantoprazole    Tablet 40 milliGRAM(s) Oral daily  polyethylene glycol 3350 17 Gram(s) Oral daily  psyllium Powder 1 Packet(s) Oral two times a day  senna 2 Tablet(s) Oral at bedtime  sodium chloride 0.9% lock flush 3 milliLiter(s) IV Push every 8 hours  sodium chloride 0.9%. 1000 milliLiter(s) (10 mL/Hr) IV Continuous <Continuous>  sodium chloride 0.9%. 1000 milliLiter(s) (5 mL/Hr) IV Continuous <Continuous>  warfarin 2.5 milliGRAM(s) Oral once    MEDICATIONS  (PRN):  dextrose Oral Gel 15 Gram(s) Oral once PRN Blood Glucose LESS THAN 70 milliGRAM(s)/deciliter  oxyCODONE    IR 5 milliGRAM(s) Oral every 4 hours PRN Moderate Pain (4 - 6)    Allergies  No Known Allergies    Vital Signs Last 24 Hrs  T(C): 36.6 (16 Jun 2023 09:32), Max: 38.4 (15 Kelvin 2023 19:00)  T(F): 97.8 (16 Jun 2023 09:32), Max: 101.1 (15 Kelvin 2023 19:00)  HR: 69 (16 Jun 2023 09:32) (67 - 83)  BP: 96/56 (16 Jun 2023 09:32) (81/53 - 115/70)  BP(mean): 66 (16 Jun 2023 09:00) (63 - 81)  RR: 17 (16 Jun 2023 09:32) (10 - 32)  SpO2: 95% (16 Jun 2023 09:32) (93% - 99%)    Parameters below as of 16 Jun 2023 09:32  Patient On (Oxygen Delivery Method): room air      PHYSICAL EXAM  General: No apparent distress  Respiratory: Lungs clear bilaterally, normal rate, effort  Cardiac: +S1, S2, no m/r/g  GI: +BS, soft, non tender, non distended  Neuro: A+O X3, no tremor      LABS:                        9.5    15.92 )-----------( 205      ( 16 Jun 2023 02:15 )             27.8     06-16    135  |  102  |  21.5<H>  ----------------------------<  195<H>  3.8   |  17.0<L>  |  0.91    Ca    8.1<L>      16 Jun 2023 02:15  Mg     1.9     06-16    TPro  5.6<L>  /  Alb  3.4  /  TBili  1.6  /  DBili  0.4<H>  /  AST  56<H>  /  ALT  18  /  AlkPhos  59  06-15      POCT Blood Glucose.: 172 mg/dL (06-16-23 @ 07:50)  POCT Blood Glucose.: 148 mg/dL (06-15-23 @ 23:51)  POCT Blood Glucose.: 109 mg/dL (06-15-23 @ 20:55)  POCT Blood Glucose.: 83 mg/dL (06-15-23 @ 20:38)  POCT Blood Glucose.: 76 mg/dL (06-15-23 @ 20:24)  POCT Blood Glucose.: 88 mg/dL (06-15-23 @ 20:06)  POCT Blood Glucose.: 132 mg/dL (06-15-23 @ 18:09)        Thyroid Stimulating Hormone, Serum: 1.71 uIU/mL (06-12-23 @ 16:00)  Free Thyroxine, Serum: 1.2 ng/dL (06-12-23 @ 16:00)

## 2023-06-18 NOTE — DISCHARGE NOTE PROVIDER - NSDCCPCAREPLAN_GEN_ALL_CORE_FT
PRINCIPAL DISCHARGE DIAGNOSIS  Diagnosis: Severe mitral valve regurgitation  Assessment and Plan of Treatment: status post mitral valve replacment  shower daily, soap and water to incisions, no lotions/creams to incisions  no swimming/pools/hot tubs/baths until incisions healed  no driving or heavy lifting >10lbs until cleared by surgeon  refer to discharge booklet for additional instructions  you are being discharged on coumadin for your mitral valve. your INR will be checked on mondays and thursdays by a nurse who will come to your home to draw them. your levels will be monitored by Dr. Samuels (primary care doctor)      SECONDARY DISCHARGE DIAGNOSES  Diagnosis: Type 2 diabetes mellitus with other circulatory complication, with long-term current use of insulin  Assessment and Plan of Treatment: It is extremely important to follow a diabetic (consistent carbohydrates, LOW/NO ADDED SUGAR) diet and monitor your glucose (sugar) levels. You have an increased risk of complications, including wound infections, due to the diabetes.  1. Check your glucoses 3-4 times daily before meals and bedtime.   2. Keep a log of your glucose levels every day.   3. Make an appointment to meet with your primary care provider or endocrinologist within a week.   4. Take ALL of your medications as prescribed. Only hold medications for low glucose levels (< 80) or if you are symptomatic (dizzy, sweating, lightheaded).  5. Ideally, we would like all of the glucose levels to be between .   You may have been discharged on different medications than you were taking before. Your body reacts differently to the medications after surgery. Please continue to take the medications as prescribed and notify the office, nurse practitioner or your PCP if you have any concerns right away.    Diagnosis: Hepatitis C  Assessment and Plan of Treatment: follow up with your gastroenterologist Dr. Nelson   or can follow up [facility] hepatologist Dr. De La Torre

## 2023-06-18 NOTE — DISCHARGE NOTE PROVIDER - PROVIDER TOKENS
PROVIDER:[TOKEN:[[id number]]],PROVIDER:[TOKEN:[[id number]]],PROVIDER:[TOKEN:[[id number]]]

## 2023-06-18 NOTE — DISCHARGE NOTE PROVIDER - NSDCMRMEDTOKEN_GEN_ALL_CORE_FT
amlodipine-valsartan 10 mg-320 mg oral tablet: 1 tab(s) orally once a day  aspirin 81 mg oral delayed release tablet: 1 tab(s) orally once a day  ATORVASTATIN 80 MG TABLET:   FREESTYLE BLAIRE 14 DAY SENSOR: APPLY AS DIRECTED EVERY 14 DAYS  HYDROCODONE-ACETAMIN  MG: TAKE 1 TABLET BY MOUTH EVERY 4 TO 6 HOURS AS NEEDED FOR PAIN  Insulin Lispro KwikPen 100 units/mL injectable solution: 72/25  12units   methIMAzole 5 mg oral tablet: 1 orally once a day  Metoprolol Succinate  mg oral tablet, extended release: 1 tab(s) orally once a day  Protonix 40 mg oral delayed release tablet: 1 orally once a day  Trulicity Pen 0.75 mg/0.5 mL subcutaneous solution:    alcohol swabs: Apply topically to affected area 4 times a day  aspirin 81 mg oral delayed release tablet: 1 tab(s) orally once a day  atorvastatin 80 mg oral tablet: 1 tab(s) orally once a day (at bedtime)  furosemide 40 mg oral tablet: 1 tab(s) orally once a day  glucometer (per patient&#x27;s insurance): Test blood sugars four times a day. Dispense #1 glucometer.  Insulin Lispro KwikPen 100 units/mL injectable solution: 72/25  12units   Insulin Pen Needles, 4mm: 1 application subcutaneously 4 times a day. ** Use with insulin pen **  lancets: 1 application subcutaneously 4 times a day  methIMAzole 5 mg oral tablet: 1 orally once a day  Metoprolol Succinate  mg oral tablet, extended release: 1 tab(s) orally once a day  oxycodone-acetaminophen 5 mg-325 mg oral tablet: 1 tab(s) orally every 6 hours as needed for  severe pain MDD: 4  Protonix 40 mg oral delayed release tablet: 1 orally once a day  senna leaf extract oral tablet: 2 tab(s) orally once a day (at bedtime)  test strips (per patient&#x27;s insurance): 1 application subcutaneously 4 times a day. ** Compatible with patient&#x27;s glucometer **  Trulicity Pen 0.75 mg/0.5 mL subcutaneous solution:   warfarin 2.5 mg oral tablet: 1 tab(s) orally once a day (at bedtime) Anticoagulation for Mitral Valve Replacement   alcohol swabs: Apply topically to affected area 4 times a day  aspirin 81 mg oral delayed release tablet: 1 tab(s) orally once a day  atorvastatin 80 mg oral tablet: 1 tab(s) orally once a day (at bedtime)  dulaglutide 0.75 mg/0.5 mL subcutaneous solution: 0.75 milligram(s) subcutaneously once a week  furosemide 40 mg oral tablet: 1 tab(s) orally once a day  glucometer (per patient&#x27;s insurance): Test blood sugars four times a day. Dispense #1 glucometer.  Insulin Pen Needles, 4mm: 1 application subcutaneously 4 times a day. ** Use with insulin pen **  lancets: 1 application subcutaneously 4 times a day  methIMAzole 5 mg oral tablet: 1 orally once a day  Metoprolol Succinate  mg oral tablet, extended release: 1 tab(s) orally once a day  oxycodone-acetaminophen 5 mg-325 mg oral tablet: 1 tab(s) orally every 6 hours as needed for  severe pain MDD: 4  Protonix 40 mg oral delayed release tablet: 1 orally once a day  senna leaf extract oral tablet: 2 tab(s) orally once a day (at bedtime)  test strips (per patient&#x27;s insurance): 1 application subcutaneously 4 times a day. ** Compatible with patient&#x27;s glucometer **  warfarin 2.5 mg oral tablet: 1 tab(s) orally once a day (at bedtime) Anticoagulation for Mitral Valve Replacement

## 2023-06-18 NOTE — DISCHARGE NOTE PROVIDER - NSDCCPTREATMENT_GEN_ALL_CORE_FT
PRINCIPAL PROCEDURE  Procedure: MVR (mitral valve replacement)  Findings and Treatment: 31mm Lyels 2

## 2023-06-18 NOTE — PROGRESS NOTE ADULT - SUBJECTIVE AND OBJECTIVE BOX
POD #4 s/p Mitral Valve Replacement    PAST MEDICAL & SURGICAL HISTORY:  Lung nodule  Severe mitral regurgitation  Aortic stenosis  Chronic hepatitis C virus infection  Coronary artery disease of native artery of native heart with stable angina pectoris  Type 2 diabetes mellitus with other circulatory complication, with long-term current use of insulin  Primary hypertension  Mixed hyperlipidemia  Hemochromatosis  H/O neck surgery PCDF 1992,  ACDF 1993  S/P appendectomy 1974, right shoulder dislocation 1962  H/O hernia repair 1985  S/P spinal fusion  S/P shoulder surgery  Status post insertion of drug-eluting stent into right coronary artery for coronary artery disease  History of ERCP    FAMILY HISTORY:  Family history of diabetes mellitus  Family history of diabetes mellitus    Brief Hospital Course: 68M, pmhx CAD s/p PCI of RCA 2/16/2021, HTN, HLD, DM, hyperthyroidism, hemochromatosis, neck surgery s/p PCDF & ACDF, appendectomy, hernia repair, Right shoulder dislocation s/p repair, Hepatitis C virus s/p treatment, known MR initially planned to be evaluated for mitral clip, recent admission in April for syncope secondary to orthostatic hypotension and BELLO in setting of dehydration, found to have strep salivarius/vestilbularis bacteremia, QUINCY with severe MR, inconclusive for endocarditis, pt tx with 4 weeks ceftriaxone, now s/p repeat QUINCY 6/12 to assess valve, found to have severe MR with multiple mobile echodensities. 6/14 underwent MVR with Dr. Islas. Postop course significant for ABLA (stable s/p PRBC transfusions), hypotension (resolved s/p fluid resusitation), and BELLO (resolved).    Significant recent/past 24 hr events: No overnight events reported.    Subjective: Patient lying in bed in NAD. +Tolerating diet. +Passing gas. +Pain currently controlled. Denies fevers, chills, lightheadedness, dizziness, HA, CP, palpitations, SOB, cough, abdominal pain, N/V, diarrhea, numbness/tingling in extremities, or any other acute complaints.  ROS negative x 10 systems except as noted above.    MEDICATIONS  (STANDING):  acetaminophen     Tablet .. 975 milliGRAM(s) Oral every 6 hours  atorvastatin 80 milliGRAM(s) Oral at bedtime  chlorhexidine 2% Cloths 1 Application(s) Topical daily  insulin glargine Injectable (LANTUS) 15 Unit(s) SubCutaneous at bedtime  insulin lispro (ADMELOG) corrective regimen sliding scale   SubCutaneous Before meals and at bedtime  insulin lispro Injectable (ADMELOG) 5 Unit(s) SubCutaneous three times a day before meals  lactulose Syrup 20 Gram(s) Oral every 6 hours  lidocaine   4% Patch 1 Patch Transdermal every 24 hours  methimazole 5 milliGRAM(s) Oral daily  pantoprazole    Tablet 40 milliGRAM(s) Oral daily  polyethylene glycol 3350 17 Gram(s) Oral daily  psyllium Powder 1 Packet(s) Oral two times a day  senna 2 Tablet(s) Oral at bedtime  sodium chloride 0.9% lock flush 3 milliLiter(s) IV Push every 8 hours    MEDICATIONS  (PRN):  dextrose Oral Gel 15 Gram(s) Oral once PRN Blood Glucose LESS THAN 70 milliGRAM(s)/deciliter  oxyCODONE    IR 5 milliGRAM(s) Oral every 4 hours PRN Moderate Pain (4 - 6)  oxyCODONE    IR 10 milliGRAM(s) Oral every 4 hours PRN Severe Pain (7 - 10)    Allergies: No Known Allergies    Vitals   T(C): 36.8 (17 Jun 2023 23:39), Max: 37.4 (17 Jun 2023 13:05)  T(F): 98.2 (17 Jun 2023 23:39), Max: 99.4 (17 Jun 2023 13:05)  HR: 73 (17 Jun 2023 23:39) (71 - 78)  BP: 109/66 (17 Jun 2023 23:39) (99/63 - 131/69)  BP(mean): 79 (17 Jun 2023 05:51) (79 - 87)  RR: 17 (17 Jun 2023 23:39) (16 - 20)  SpO2: 91% (17 Jun 2023 23:39) (91% - 95%)  O2 Parameters below as of 17 Jun 2023 23:39  Patient On (Oxygen Delivery Method): nasal cannula  O2 Flow (L/min): 4    I&O's Detail    16 Jun 2023 07:01  -  17 Jun 2023 07:00  --------------------------------------------------------  IN:    Oral Fluid: 220 mL    sodium chloride 0.9%: 5 mL    sodium chloride 0.9%: 10 mL  Total IN: 235 mL    OUT:    Voided (mL): 690 mL  Total OUT: 690 mL    Total NET: -455 mL      17 Jun 2023 07:01  -  18 Jun 2023 01:50  --------------------------------------------------------  IN:    Oral Fluid: 225 mL  Total IN: 225 mL    OUT:    Voided (mL): 600 mL  Total OUT: 600 mL    Total NET: -375 mL    Physical Exam  Neuro: A+O x 3, non-focal, speech clear and intact  HEENT:  NCAT, No conjuctival edema or icterus, no thrush.    Neck:  Supple, trachea midline  Pulm: Coarse BSs bilaterally, no accessory muscle use noted  CV: regular rate, regular rhythm, +S1S2, no murmur or rub noted  Abd: soft, NT, ND, + BS  Ext: RICO x 4, no edema, no cyanosis, distal motor/neuro/circ intact  Skin: warm, dry, perfused  Incisions: midsternal mepilex C/D/I, sternum stable    LABS                        11.3   12.75 )-----------( 159      ( 17 Jun 2023 15:24 )             33.0     06-17    126<L>  |  97  |  29.4<H>  ----------------------------<  139<H>  3.9   |  15.0<L>  |  0.94    Ca    7.8<L>      17 Jun 2023 15:24  Mg     2.4     06-17    TPro  5.9<L>  /  Alb  3.3  /  TBili  1.8  /  DBili  0.5<H>  /  AST  33  /  ALT  21  /  AlkPhos  61  06-17    PT/INR - ( 17 Jun 2023 07:25 )   PT: 18.0 sec;   INR: 1.54 ratio      POCT Blood Glucose.: 142 mg/dL (06-17-23 @ 21:13)  POCT Blood Glucose.: 121 mg/dL (06-17-23 @ 17:21)  POCT Blood Glucose.: 134 mg/dL (06-17-23 @ 12:09)  POCT Blood Glucose.: 153 mg/dL (06-17-23 @ 08:24)    ABG - ( 17 Jun 2023 17:38 )  pH, Arterial: 7.440 pH, Blood: x     /  pCO2: 30    /  pO2: 58    / HCO3: 20    / Base Excess: -3.8  /  SaO2: 91.1        Last CXR:  < from: Xray Chest 1 View- PORTABLE-Routine (06.15.23 @ 06:02) >  FINDINGS:  Single frontal view of the chest demonstrates right basilar atelectasis. The cardiomediastinal silhouette is enlarged. No acute osseous abnormalities. Overlying EKG leads and wires are noted. Cervical spinal fusion hardware. Right-sided central venous catheter is unchanged. NG tube has been removed. Mediastinal chest tubes remain. Mediastinal sternotomy wires.  IMPRESSION: Status post NG tube removal.  < end of copied text >

## 2023-06-18 NOTE — DISCHARGE NOTE PROVIDER - NSDCFUADDINST_GEN_ALL_CORE_FT
Please call the Cardiothoracic Surgery office at [phone] if you are experiencing any shortness of breath, chest pain, fevers or chills, drainage from the incisions, persistent nausea, vomiting or if you have any questions about your medications. If the symptoms are severe, call 911 and go to the nearest hospital. You can also call [phone] for an emergency [facility] ambulance, which will take you to the closest [facility].    If you need any assistance for making any appointments for a new consult or referral in any specialty, please call our [facility] Clinical Coordination Center at [phone].

## 2023-06-18 NOTE — DISCHARGE NOTE PROVIDER - NSDCFUADDAPPT_GEN_ALL_CORE_FT
1. Follow up with Dr. Islas on _____  The cardiac surgery office is located at [facility], first floor. Take a left at the end of the lobby until the end of that tovar (past the elevator bank). Make a left and the office is on your right across from the elevators.    Your Care Navigator Nurse Practitioner will be in touch to see you in your home within a few days from discharge. The Follow Your Heart program can help ensure you understand your medications, discharge instructions and answer any questions you may have at that time. They are also a great source to address concerns during the day and may be reached at [phone].    Follow up with cardiology, primary care doctor and endocrinology in 2 weeks. 1. Follow up with Dr. Macias (in place of Dr. Islas) on 6/30/23 at 1PM.  You will need a Transthoracic Echo (TTE) and Chest Xray  (script is in your folder) prior to the appointment. On arrival please stay on the first floor and go to the ECHO lab then to radiology PRIOR to going to the CT surgery office.   The cardiac surgery office is located at [facility], first floor. Take a left at the end of the lobby until the end of that tovar (past the elevator bank). Make a left and the office is on your right across from the elevators.  ---  Your Care Navigator Nurse Practitioner will be in touch to see you in your home within a few days from discharge. The Follow Your Heart program can help ensure you understand your medications, discharge instructions and answer any questions you may have at that time. They are also a great source to address concerns during the day and may be reached at [phone].  ---  Follow up with cardiology, primary care doctor and endocrinology in 2 weeks.

## 2023-06-18 NOTE — DISCHARGE NOTE PROVIDER - HOSPITAL COURSE
68M, pmhx CAD s/p PCI of RCA 2/16/2021, HTN, HLD, DM, hyperthyroidism, hemochromatosis, neck surgery s/p PCDF & ACDF, appendectomy, hernia repair, Right shoulder dislocation s/p repair, Hepatitis C virus s/p treatment, known MR initially planned to be evaluated for mitral clip, recent admission in April for syncope secondary to orthostatic hypotension and BELLO in setting of dehydration, found to have strep salivarius/vestilbularis bacteremia, QUINCY with severe MR, inconclusive for endocarditis, pt tx with 4 weeks ceftriaxone, s/p repeat QUINCY 6/12 to assess valve, found to have severe MR with multiple mobile echodensities. 6/14 underwent MVR with Dr. Islas. Postop course significant for acute blood loss anemia (stable s/p PRBC transfusions), hypotension (resolved s/p fluid resusitation), and BELLO (resolved). Pt hemodynamically stable and ambulating well. Pt stable for discharge home as per Dr. Islas.  home INR draws set up, Mon/Thurs to start thurs 6/22 to be followed by PCP Dr. Hortensia Samuels [place] 68M, PMH CAD s/p PCI of RCA 2/16/2021, HTN, HLD, DM II, hyperthyroidism, hemochromatosis, neck surgery s/p PCDF & ACDF, appendectomy, hernia repair, Right shoulder dislocation s/p repair, Hepatitis C virus s/p treatment, known MR initially planned to be evaluated for mitral clip, recent admission in April for syncope secondary to orthostatic hypotension and BELLO in setting of dehydration, found to have strep salivarius / vestilbularis bacteremia, QUINCY at that time with severe MR and inconclusive for endocarditis. Patient was treated with 4 weeks ceftriaxone and had repeat QUINCY 6/12 to assess valve which showed severe MR with multiple mobile echo densities. Patient underwent MV Replacement (tissue) with Dr. Islas 6/14/23. Postop course significant for acute blood loss anemia (stable s/p PRBC transfusions), hypotension (resolved s/p fluid resuscitation), and BELLO (resolved). Pt hemodynamically stable and ambulating well. Pt stable for discharge home as per Dr. Islas.  Home INR draws set up, Mon/Thurs to start Thursday 6/22 to be followed by PCP Dr. Hortensia Samuels in [place].  Patient is planned to follow up with Dr. Macias (in lieu of Dr. Islas) on 6/30/23 with pre appointment TTE and CXR (communicated with Adriane in CTS office).  Patient is also leaving with steri strips over MSI for enhanced healing time.   Per Dr. Islas, patient is stable for discharge with follow up.     Pre-discharge TTE:  < from: TTE Echo Complete w/ Contrast w/ Doppler (06.20.23 @ 14:05) >    Summary:   1. Follow up echo to evaluate pericardial effusion.   2. Small to moderate pericardial effusions.    < end of copied text >

## 2023-06-19 LAB
ANION GAP SERPL CALC-SCNC: 11 MMOL/L — SIGNIFICANT CHANGE UP (ref 5–17)
BILIRUB SERPL-MCNC: 1.4 MG/DL — SIGNIFICANT CHANGE UP (ref 0.4–2)
BUN SERPL-MCNC: 20.4 MG/DL — HIGH (ref 8–20)
CALCIUM SERPL-MCNC: 8.5 MG/DL — SIGNIFICANT CHANGE UP (ref 8.4–10.5)
CHLORIDE SERPL-SCNC: 102 MMOL/L — SIGNIFICANT CHANGE UP (ref 96–108)
CO2 SERPL-SCNC: 23 MMOL/L — SIGNIFICANT CHANGE UP (ref 22–29)
CREAT SERPL-MCNC: 0.78 MG/DL — SIGNIFICANT CHANGE UP (ref 0.5–1.3)
CULTURE RESULTS: SIGNIFICANT CHANGE UP
EGFR: 97 ML/MIN/1.73M2 — SIGNIFICANT CHANGE UP
GLUCOSE BLDC GLUCOMTR-MCNC: 115 MG/DL — HIGH (ref 70–99)
GLUCOSE BLDC GLUCOMTR-MCNC: 121 MG/DL — HIGH (ref 70–99)
GLUCOSE BLDC GLUCOMTR-MCNC: 134 MG/DL — HIGH (ref 70–99)
GLUCOSE BLDC GLUCOMTR-MCNC: 160 MG/DL — HIGH (ref 70–99)
GLUCOSE SERPL-MCNC: 127 MG/DL — HIGH (ref 70–99)
HCT VFR BLD CALC: 39.5 % — SIGNIFICANT CHANGE UP (ref 39–50)
HGB BLD-MCNC: 13.1 G/DL — SIGNIFICANT CHANGE UP (ref 13–17)
INR BLD: 1.78 RATIO — HIGH (ref 0.88–1.16)
MAGNESIUM SERPL-MCNC: 2.3 MG/DL — SIGNIFICANT CHANGE UP (ref 1.8–2.6)
MCHC RBC-ENTMCNC: 30 PG — SIGNIFICANT CHANGE UP (ref 27–34)
MCHC RBC-ENTMCNC: 33.2 GM/DL — SIGNIFICANT CHANGE UP (ref 32–36)
MCV RBC AUTO: 90.6 FL — SIGNIFICANT CHANGE UP (ref 80–100)
MELD SCORE WITH DIALYSIS: 27 POINTS — SIGNIFICANT CHANGE UP
MELD SCORE WITHOUT DIALYSIS: 15 POINTS — SIGNIFICANT CHANGE UP
PLATELET # BLD AUTO: 264 K/UL — SIGNIFICANT CHANGE UP (ref 150–400)
POTASSIUM SERPL-MCNC: 4.3 MMOL/L — SIGNIFICANT CHANGE UP (ref 3.5–5.3)
POTASSIUM SERPL-SCNC: 4.3 MMOL/L — SIGNIFICANT CHANGE UP (ref 3.5–5.3)
PROTHROM AB SERPL-ACNC: 20.8 SEC — HIGH (ref 10.5–13.4)
RBC # BLD: 4.36 M/UL — SIGNIFICANT CHANGE UP (ref 4.2–5.8)
RBC # FLD: 14.1 % — SIGNIFICANT CHANGE UP (ref 10.3–14.5)
SODIUM SERPL-SCNC: 136 MMOL/L — SIGNIFICANT CHANGE UP (ref 135–145)
SPECIMEN SOURCE: SIGNIFICANT CHANGE UP
WBC # BLD: 11.98 K/UL — HIGH (ref 3.8–10.5)
WBC # FLD AUTO: 11.98 K/UL — HIGH (ref 3.8–10.5)

## 2023-06-19 PROCEDURE — 71045 X-RAY EXAM CHEST 1 VIEW: CPT | Mod: 26

## 2023-06-19 PROCEDURE — 71045 X-RAY EXAM CHEST 1 VIEW: CPT | Mod: 26,77

## 2023-06-19 PROCEDURE — 99024 POSTOP FOLLOW-UP VISIT: CPT

## 2023-06-19 PROCEDURE — 99233 SBSQ HOSP IP/OBS HIGH 50: CPT

## 2023-06-19 RX ORDER — WARFARIN SODIUM 2.5 MG/1
2.5 TABLET ORAL ONCE
Refills: 0 | Status: COMPLETED | OUTPATIENT
Start: 2023-06-19 | End: 2023-06-19

## 2023-06-19 RX ORDER — ACETAMINOPHEN 500 MG
1000 TABLET ORAL ONCE
Refills: 0 | Status: COMPLETED | OUTPATIENT
Start: 2023-06-19 | End: 2023-06-19

## 2023-06-19 RX ORDER — METHOCARBAMOL 500 MG/1
500 TABLET, FILM COATED ORAL ONCE
Refills: 0 | Status: COMPLETED | OUTPATIENT
Start: 2023-06-19 | End: 2023-06-19

## 2023-06-19 RX ORDER — FUROSEMIDE 40 MG
40 TABLET ORAL
Refills: 0 | Status: COMPLETED | OUTPATIENT
Start: 2023-06-19 | End: 2023-06-20

## 2023-06-19 RX ADMIN — Medication 5 UNIT(S): at 08:16

## 2023-06-19 RX ADMIN — WARFARIN SODIUM 2.5 MILLIGRAM(S): 2.5 TABLET ORAL at 21:35

## 2023-06-19 RX ADMIN — OXYCODONE HYDROCHLORIDE 10 MILLIGRAM(S): 5 TABLET ORAL at 11:10

## 2023-06-19 RX ADMIN — OXYCODONE HYDROCHLORIDE 10 MILLIGRAM(S): 5 TABLET ORAL at 20:50

## 2023-06-19 RX ADMIN — OXYCODONE HYDROCHLORIDE 10 MILLIGRAM(S): 5 TABLET ORAL at 15:59

## 2023-06-19 RX ADMIN — OXYCODONE HYDROCHLORIDE 10 MILLIGRAM(S): 5 TABLET ORAL at 14:59

## 2023-06-19 RX ADMIN — Medication 1 PACKET(S): at 06:11

## 2023-06-19 RX ADMIN — LIDOCAINE 1 PATCH: 4 CREAM TOPICAL at 21:41

## 2023-06-19 RX ADMIN — Medication 400 MILLIGRAM(S): at 12:28

## 2023-06-19 RX ADMIN — OXYCODONE HYDROCHLORIDE 10 MILLIGRAM(S): 5 TABLET ORAL at 07:17

## 2023-06-19 RX ADMIN — Medication 12.5 MILLIGRAM(S): at 17:13

## 2023-06-19 RX ADMIN — Medication 40 MILLIGRAM(S): at 09:23

## 2023-06-19 RX ADMIN — METHOCARBAMOL 500 MILLIGRAM(S): 500 TABLET, FILM COATED ORAL at 16:00

## 2023-06-19 RX ADMIN — Medication 12.5 MILLIGRAM(S): at 06:11

## 2023-06-19 RX ADMIN — OXYCODONE HYDROCHLORIDE 10 MILLIGRAM(S): 5 TABLET ORAL at 10:10

## 2023-06-19 RX ADMIN — OXYCODONE HYDROCHLORIDE 10 MILLIGRAM(S): 5 TABLET ORAL at 06:19

## 2023-06-19 RX ADMIN — Medication 40 MILLIGRAM(S): at 16:02

## 2023-06-19 RX ADMIN — Medication 5 UNIT(S): at 12:32

## 2023-06-19 RX ADMIN — LIDOCAINE 1 PATCH: 4 CREAM TOPICAL at 11:02

## 2023-06-19 RX ADMIN — CHLORHEXIDINE GLUCONATE 1 APPLICATION(S): 213 SOLUTION TOPICAL at 11:43

## 2023-06-19 RX ADMIN — Medication 40 MILLIGRAM(S): at 06:11

## 2023-06-19 RX ADMIN — Medication 1000 MILLIGRAM(S): at 12:59

## 2023-06-19 RX ADMIN — Medication 5 UNIT(S): at 17:15

## 2023-06-19 RX ADMIN — SODIUM CHLORIDE 3 MILLILITER(S): 9 INJECTION INTRAMUSCULAR; INTRAVENOUS; SUBCUTANEOUS at 06:12

## 2023-06-19 RX ADMIN — ATORVASTATIN CALCIUM 80 MILLIGRAM(S): 80 TABLET, FILM COATED ORAL at 21:34

## 2023-06-19 RX ADMIN — PANTOPRAZOLE SODIUM 40 MILLIGRAM(S): 20 TABLET, DELAYED RELEASE ORAL at 11:43

## 2023-06-19 RX ADMIN — Medication 975 MILLIGRAM(S): at 01:22

## 2023-06-19 RX ADMIN — OXYCODONE HYDROCHLORIDE 10 MILLIGRAM(S): 5 TABLET ORAL at 19:50

## 2023-06-19 RX ADMIN — SENNA PLUS 2 TABLET(S): 8.6 TABLET ORAL at 21:34

## 2023-06-19 RX ADMIN — LIDOCAINE 1 PATCH: 4 CREAM TOPICAL at 06:55

## 2023-06-19 RX ADMIN — Medication 975 MILLIGRAM(S): at 00:22

## 2023-06-19 RX ADMIN — SODIUM CHLORIDE 3 MILLILITER(S): 9 INJECTION INTRAMUSCULAR; INTRAVENOUS; SUBCUTANEOUS at 21:38

## 2023-06-19 RX ADMIN — SODIUM CHLORIDE 3 MILLILITER(S): 9 INJECTION INTRAMUSCULAR; INTRAVENOUS; SUBCUTANEOUS at 13:00

## 2023-06-19 RX ADMIN — INSULIN GLARGINE 15 UNIT(S): 100 INJECTION, SOLUTION SUBCUTANEOUS at 21:38

## 2023-06-19 NOTE — PROGRESS NOTE ADULT - SUBJECTIVE AND OBJECTIVE BOX
Patient seen and examined.  Denies CP, SOB, N/V. Has some leg swelling    T(C): 36.8 (06-19-23 @ 00:00)  T(F): 98.3 (06-19-23 @ 00:00)  HR: 75 (06-19-23 @ 00:00)  BP: 102/70 (06-19-23 @ 00:00)    RR: 18 (06-19-23 @ 00:00)  SpO2: 92% (06-19-23 @ 00:00)        Physical Exam:  Gen: A&Ox3  Pulm:  CTA b/l, no r/r/w  CV:  S1S2, RRR,   Abd: +BS, soft, NT, ND  Ext:  +DP b/l, Mild edema B/L lower extremities  Incision:  c/d/i no click, no exudate + staples    I&O's Detail    17 Jun 2023 07:01  -  18 Jun 2023 07:00  --------------------------------------------------------  IN:    Oral Fluid: 225 mL  Total IN: 225 mL    OUT:    Voided (mL): 1450 mL  Total OUT: 1450 mL    Total NET: -1225 mL      18 Jun 2023 07:01  -  19 Jun 2023 00:48  --------------------------------------------------------  IN:    Oral Fluid: 120 mL  Total IN: 120 mL    OUT:    Voided (mL): 1 mL  Total OUT: 1 mL    Total NET: 119 mL                              12.1   12.48 )-----------( 210      ( 18 Jun 2023 06:25 )             35.8   06-18    137  |  104  |  21.4<H>  ----------------------------<  122<H>  4.3   |  22.0  |  0.84    Ca    8.1<L>      18 Jun 2023 06:25  Mg     2.4     06-18    TPro  6.1<L>  /  Alb  3.4  /  TBili  1.7  /  DBili  0.5<H>  /  AST  30  /  ALT  22  /  AlkPhos  69  06-18  aPTT: x    ; PT: 19.7 sec; INR: 1.69 ratio  06-18-23 @ 06:25       ABG - ( 17 Jun 2023 17:38 )  pH: 7.440 /  pCO2: 30    /  pO2: 58    / HCO3: 20    / Base Excess: -3.8  /  SaO2: 91.1 /  Lactate: x        CAPILLARY BLOOD GLUCOSE      POCT Blood Glucose.: 105 mg/dL (18 Jun 2023 21:04)        Medications:  atorvastatin 80 milliGRAM(s) Oral at bedtime  chlorhexidine 2% Cloths 1 Application(s) Topical daily  dextrose Oral Gel 15 Gram(s) Oral once PRN  furosemide    Tablet 40 milliGRAM(s) Oral daily  glucagon  Injectable 1 milliGRAM(s) IntraMuscular once  insulin glargine Injectable (LANTUS) 15 Unit(s) SubCutaneous at bedtime  insulin lispro (ADMELOG) corrective regimen sliding scale   SubCutaneous Before meals and at bedtime  insulin lispro Injectable (ADMELOG) 5 Unit(s) SubCutaneous three times a day before meals  lidocaine   4% Patch 1 Patch Transdermal every 24 hours  methimazole 5 milliGRAM(s) Oral daily  metoprolol tartrate 12.5 milliGRAM(s) Oral two times a day  oxyCODONE    IR 5 milliGRAM(s) Oral every 4 hours PRN  oxyCODONE    IR 10 milliGRAM(s) Oral every 4 hours PRN  pantoprazole    Tablet 40 milliGRAM(s) Oral daily  polyethylene glycol 3350 17 Gram(s) Oral daily  psyllium Powder 1 Packet(s) Oral two times a day  senna 2 Tablet(s) Oral at bedtime  sodium chloride 0.9% lock flush 3 milliLiter(s) IV Push every 8 hours

## 2023-06-19 NOTE — PROGRESS NOTE ADULT - SUBJECTIVE AND OBJECTIVE BOX
INTERVAL EVENTS:  Follow up diabetes, hyperthyroid management  S/p chest tube placement    ROS: C/o mild pain at chest tube site, no sob/chest pain.    MEDICATIONS  (STANDING):  acetaminophen   IVPB .. 1000 milliGRAM(s) IV Intermittent once  atorvastatin 80 milliGRAM(s) Oral at bedtime  chlorhexidine 2% Cloths 1 Application(s) Topical daily  furosemide   Injectable 40 milliGRAM(s) IV Push <User Schedule>  glucagon  Injectable 1 milliGRAM(s) IntraMuscular once  insulin glargine Injectable (LANTUS) 15 Unit(s) SubCutaneous at bedtime  insulin lispro (ADMELOG) corrective regimen sliding scale   SubCutaneous Before meals and at bedtime  insulin lispro Injectable (ADMELOG) 5 Unit(s) SubCutaneous three times a day before meals  lidocaine   4% Patch 1 Patch Transdermal every 24 hours  methimazole 5 milliGRAM(s) Oral daily  metoprolol tartrate 12.5 milliGRAM(s) Oral two times a day  pantoprazole    Tablet 40 milliGRAM(s) Oral daily  polyethylene glycol 3350 17 Gram(s) Oral daily  psyllium Powder 1 Packet(s) Oral two times a day  senna 2 Tablet(s) Oral at bedtime  sodium chloride 0.9% lock flush 3 milliLiter(s) IV Push every 8 hours  warfarin 2.5 milliGRAM(s) Oral once    MEDICATIONS  (PRN):  dextrose Oral Gel 15 Gram(s) Oral once PRN Blood Glucose LESS THAN 70 milliGRAM(s)/deciliter  oxyCODONE    IR 5 milliGRAM(s) Oral every 4 hours PRN Moderate Pain (4 - 6)  oxyCODONE    IR 10 milliGRAM(s) Oral every 4 hours PRN Severe Pain (7 - 10)    Allergies  No Known Allergies    Vital Signs Last 24 Hrs  T(C): 36.7 (19 Jun 2023 07:33), Max: 37.2 (18 Jun 2023 16:15)  T(F): 98 (19 Jun 2023 07:33), Max: 98.9 (18 Jun 2023 16:15)  HR: 81 (19 Jun 2023 12:00) (75 - 81)  BP: 121/75 (19 Jun 2023 12:00) (98/59 - 131/75)  BP(mean): --  RR: 20 (19 Jun 2023 12:00) (17 - 20)  SpO2: 93% (19 Jun 2023 12:00) (92% - 96%)    Parameters below as of 19 Jun 2023 12:00  Patient On (Oxygen Delivery Method): room air    PHYSICAL EXAM:  Constitutional: NAD, well-groomed, well-developed  HEENT: PERRLA, EOMI, no exophalmos  Neck: No LAD, No JVD, trachea midline, no thyroid enlargement  Back: Normal spine flexure, No CVA tenderness  Respiratory: CTAB  Cardiovascular: S1 and S2, RRR, no M/G/R  Gastrointestinal: BS+, soft, no organomeglag or mass  Extremities: No peripheral edema, no pedal lesions  Vascular: 2+ peripheral pulses  Neurological: A/O x 3, no focal deficits  Psychiatric: Normal mood, normal affect  Musculoskeletal: 5/5 strength b/l upper and lower extremities  Skin: No rashes, no acanthosis      LABS:                        13.1   11.98 )-----------( 264      ( 19 Jun 2023 05:15 )             39.5     06-19    136  |  102  |  20.4<H>  ----------------------------<  127<H>  4.3   |  23.0  |  0.78    Ca    8.5      19 Jun 2023 05:15  Mg     2.3     06-19    TPro  x   /  Alb  x   /  TBili  1.4  /  DBili  x   /  AST  x   /  ALT  x   /  AlkPhos  x   06-19      POCT Blood Glucose.: 160 mg/dL (06-19-23 @ 12:15)  POCT Blood Glucose.: 115 mg/dL (06-19-23 @ 08:05)  POCT Blood Glucose.: 105 mg/dL (06-18-23 @ 21:04)  POCT Blood Glucose.: 154 mg/dL (06-18-23 @ 17:07)    Thyroid Stimulating Hormone, Serum: 1.71 uIU/mL (06-12-23 @ 16:00)  Free Thyroxine, Serum: 1.2 ng/dL (06-12-23 @ 16:00)

## 2023-06-19 NOTE — PROGRESS NOTE ADULT - PROBLEM SELECTOR PLAN 1
S/p MV Replacement 6/14/23 with Dr. Islas.  Neurologically intact.  On Coumadin daily for MVR. Follow up INR daily to dose Coumadin.   Continue Beta blocker as tolerated  S/p 2u PRBC give 6/16 and 1u PRBC given 6/17 for hypotension, low UO, AKl, and ABLA, now resolved.    Remains hemodynamically stable. Monitor H/H.   Oxygenating well, coughing and deep breathing exercises/incentive spirometry encouraged.  Continue to monitor need for diuresis, monitor strict I/Os, replenish electrolytes PRN to keep K>4 and Mg>2.   Continue with PT, increase activity as tolerated.  FS AC+HS with coverage for glycemic control.  Tylenol, Oxy PRN for analgesia.  Continue bowel regimen PRN constipation.   Possible discharge home later today  Case and plan to be reviewed / discussed with CT Surgery attending / team during AM rounds.

## 2023-06-19 NOTE — PROCEDURE NOTE - NSTOLERANCE_GEN_A_CORE
Patient tolerated procedure well. Libtayo Pregnancy And Lactation Text: This medication is contraindicated in pregnancy and when breast feeding.

## 2023-06-19 NOTE — PROGRESS NOTE ADULT - NS ATTEND AMEND GEN_ALL_CORE FT
Pt seen and examined   wife at bedside     pt s/p Chest tube palcement - soime discomfort from this       DM - BS under control   Cont Current RX     Pt wifer  askign about setting up DExom G6 ordred By outpt Endo- Dr Dhaliwal    - needs to contact their office as to why it was switched and then do training - lola do  trianing inpt on Dexcom

## 2023-06-20 ENCOUNTER — APPOINTMENT (OUTPATIENT)
Dept: CARDIOTHORACIC SURGERY | Facility: CLINIC | Age: 68
End: 2023-06-20

## 2023-06-20 LAB
ANION GAP SERPL CALC-SCNC: 10 MMOL/L — SIGNIFICANT CHANGE UP (ref 5–17)
APTT BLD: 32.7 SEC — SIGNIFICANT CHANGE UP (ref 27.5–35.5)
BLD GP AB SCN SERPL QL: SIGNIFICANT CHANGE UP
BUN SERPL-MCNC: 17.2 MG/DL — SIGNIFICANT CHANGE UP (ref 8–20)
CALCIUM SERPL-MCNC: 8.3 MG/DL — LOW (ref 8.4–10.5)
CHLORIDE SERPL-SCNC: 100 MMOL/L — SIGNIFICANT CHANGE UP (ref 96–108)
CO2 SERPL-SCNC: 24 MMOL/L — SIGNIFICANT CHANGE UP (ref 22–29)
CREAT SERPL-MCNC: 0.78 MG/DL — SIGNIFICANT CHANGE UP (ref 0.5–1.3)
EGFR: 97 ML/MIN/1.73M2 — SIGNIFICANT CHANGE UP
GLUCOSE BLDC GLUCOMTR-MCNC: 107 MG/DL — HIGH (ref 70–99)
GLUCOSE BLDC GLUCOMTR-MCNC: 111 MG/DL — HIGH (ref 70–99)
GLUCOSE BLDC GLUCOMTR-MCNC: 112 MG/DL — HIGH (ref 70–99)
GLUCOSE BLDC GLUCOMTR-MCNC: 112 MG/DL — HIGH (ref 70–99)
GLUCOSE BLDC GLUCOMTR-MCNC: 140 MG/DL — HIGH (ref 70–99)
GLUCOSE BLDC GLUCOMTR-MCNC: 151 MG/DL — HIGH (ref 70–99)
GLUCOSE SERPL-MCNC: 115 MG/DL — HIGH (ref 70–99)
HCT VFR BLD CALC: 35.5 % — LOW (ref 39–50)
HGB BLD-MCNC: 12.3 G/DL — LOW (ref 13–17)
INR BLD: 2.04 RATIO — HIGH (ref 0.88–1.16)
INR BLD: 2.1 RATIO — HIGH (ref 0.88–1.16)
MAGNESIUM SERPL-MCNC: 2 MG/DL — SIGNIFICANT CHANGE UP (ref 1.6–2.6)
MCHC RBC-ENTMCNC: 30.8 PG — SIGNIFICANT CHANGE UP (ref 27–34)
MCHC RBC-ENTMCNC: 34.6 GM/DL — SIGNIFICANT CHANGE UP (ref 32–36)
MCV RBC AUTO: 89 FL — SIGNIFICANT CHANGE UP (ref 80–100)
PLATELET # BLD AUTO: 253 K/UL — SIGNIFICANT CHANGE UP (ref 150–400)
POTASSIUM SERPL-MCNC: 3.7 MMOL/L — SIGNIFICANT CHANGE UP (ref 3.5–5.3)
POTASSIUM SERPL-SCNC: 3.7 MMOL/L — SIGNIFICANT CHANGE UP (ref 3.5–5.3)
PROTHROM AB SERPL-ACNC: 23.8 SEC — HIGH (ref 10.5–13.4)
PROTHROM AB SERPL-ACNC: 24.5 SEC — HIGH (ref 10.5–13.4)
RBC # BLD: 3.99 M/UL — LOW (ref 4.2–5.8)
RBC # FLD: 14.1 % — SIGNIFICANT CHANGE UP (ref 10.3–14.5)
SODIUM SERPL-SCNC: 134 MMOL/L — LOW (ref 135–145)
WBC # BLD: 11.07 K/UL — HIGH (ref 3.8–10.5)
WBC # FLD AUTO: 11.07 K/UL — HIGH (ref 3.8–10.5)

## 2023-06-20 PROCEDURE — 99232 SBSQ HOSP IP/OBS MODERATE 35: CPT

## 2023-06-20 PROCEDURE — 71045 X-RAY EXAM CHEST 1 VIEW: CPT | Mod: 26,77

## 2023-06-20 PROCEDURE — 71045 X-RAY EXAM CHEST 1 VIEW: CPT | Mod: 26

## 2023-06-20 PROCEDURE — 99024 POSTOP FOLLOW-UP VISIT: CPT

## 2023-06-20 RX ORDER — FUROSEMIDE 40 MG
40 TABLET ORAL DAILY
Refills: 0 | Status: DISCONTINUED | OUTPATIENT
Start: 2023-06-21 | End: 2023-06-21

## 2023-06-20 RX ORDER — POTASSIUM CHLORIDE 20 MEQ
40 PACKET (EA) ORAL ONCE
Refills: 0 | Status: COMPLETED | OUTPATIENT
Start: 2023-06-20 | End: 2023-06-20

## 2023-06-20 RX ORDER — COLCHICINE 0.6 MG
1.2 TABLET ORAL ONCE
Refills: 0 | Status: COMPLETED | OUTPATIENT
Start: 2023-06-20 | End: 2023-06-20

## 2023-06-20 RX ORDER — WARFARIN SODIUM 2.5 MG/1
2.5 TABLET ORAL ONCE
Refills: 0 | Status: COMPLETED | OUTPATIENT
Start: 2023-06-20 | End: 2023-06-20

## 2023-06-20 RX ORDER — POTASSIUM CHLORIDE 20 MEQ
20 PACKET (EA) ORAL DAILY
Refills: 0 | Status: DISCONTINUED | OUTPATIENT
Start: 2023-06-21 | End: 2023-06-21

## 2023-06-20 RX ORDER — ACETAMINOPHEN 500 MG
650 TABLET ORAL EVERY 6 HOURS
Refills: 0 | Status: DISCONTINUED | OUTPATIENT
Start: 2023-06-20 | End: 2023-06-21

## 2023-06-20 RX ADMIN — LIDOCAINE 1 PATCH: 4 CREAM TOPICAL at 09:10

## 2023-06-20 RX ADMIN — SODIUM CHLORIDE 3 MILLILITER(S): 9 INJECTION INTRAMUSCULAR; INTRAVENOUS; SUBCUTANEOUS at 21:43

## 2023-06-20 RX ADMIN — WARFARIN SODIUM 2.5 MILLIGRAM(S): 2.5 TABLET ORAL at 23:10

## 2023-06-20 RX ADMIN — OXYCODONE HYDROCHLORIDE 10 MILLIGRAM(S): 5 TABLET ORAL at 07:46

## 2023-06-20 RX ADMIN — ATORVASTATIN CALCIUM 80 MILLIGRAM(S): 80 TABLET, FILM COATED ORAL at 21:42

## 2023-06-20 RX ADMIN — OXYCODONE HYDROCHLORIDE 10 MILLIGRAM(S): 5 TABLET ORAL at 13:09

## 2023-06-20 RX ADMIN — SODIUM CHLORIDE 3 MILLILITER(S): 9 INJECTION INTRAMUSCULAR; INTRAVENOUS; SUBCUTANEOUS at 06:34

## 2023-06-20 RX ADMIN — Medication 12.5 MILLIGRAM(S): at 06:35

## 2023-06-20 RX ADMIN — CHLORHEXIDINE GLUCONATE 1 APPLICATION(S): 213 SOLUTION TOPICAL at 11:56

## 2023-06-20 RX ADMIN — Medication 40 MILLIEQUIVALENT(S): at 10:06

## 2023-06-20 RX ADMIN — OXYCODONE HYDROCHLORIDE 5 MILLIGRAM(S): 5 TABLET ORAL at 09:13

## 2023-06-20 RX ADMIN — Medication 5 UNIT(S): at 17:27

## 2023-06-20 RX ADMIN — Medication 5 UNIT(S): at 12:10

## 2023-06-20 RX ADMIN — Medication 1 PACKET(S): at 06:34

## 2023-06-20 RX ADMIN — OXYCODONE HYDROCHLORIDE 10 MILLIGRAM(S): 5 TABLET ORAL at 06:40

## 2023-06-20 RX ADMIN — Medication 650 MILLIGRAM(S): at 23:10

## 2023-06-20 RX ADMIN — Medication 5 UNIT(S): at 08:09

## 2023-06-20 RX ADMIN — OXYCODONE HYDROCHLORIDE 10 MILLIGRAM(S): 5 TABLET ORAL at 19:48

## 2023-06-20 RX ADMIN — LIDOCAINE 1 PATCH: 4 CREAM TOPICAL at 19:19

## 2023-06-20 RX ADMIN — INSULIN GLARGINE 15 UNIT(S): 100 INJECTION, SOLUTION SUBCUTANEOUS at 21:42

## 2023-06-20 RX ADMIN — OXYCODONE HYDROCHLORIDE 10 MILLIGRAM(S): 5 TABLET ORAL at 12:09

## 2023-06-20 RX ADMIN — OXYCODONE HYDROCHLORIDE 10 MILLIGRAM(S): 5 TABLET ORAL at 20:43

## 2023-06-20 RX ADMIN — LIDOCAINE 1 PATCH: 4 CREAM TOPICAL at 08:11

## 2023-06-20 RX ADMIN — Medication 12.5 MILLIGRAM(S): at 17:00

## 2023-06-20 RX ADMIN — PANTOPRAZOLE SODIUM 40 MILLIGRAM(S): 20 TABLET, DELAYED RELEASE ORAL at 10:07

## 2023-06-20 RX ADMIN — LIDOCAINE 1 PATCH: 4 CREAM TOPICAL at 17:00

## 2023-06-20 RX ADMIN — POLYETHYLENE GLYCOL 3350 17 GRAM(S): 17 POWDER, FOR SOLUTION ORAL at 10:09

## 2023-06-20 RX ADMIN — OXYCODONE HYDROCHLORIDE 5 MILLIGRAM(S): 5 TABLET ORAL at 10:13

## 2023-06-20 RX ADMIN — Medication 1.2 MILLIGRAM(S): at 10:06

## 2023-06-20 RX ADMIN — Medication 40 MILLIGRAM(S): at 06:31

## 2023-06-20 RX ADMIN — SODIUM CHLORIDE 3 MILLILITER(S): 9 INJECTION INTRAMUSCULAR; INTRAVENOUS; SUBCUTANEOUS at 13:31

## 2023-06-20 NOTE — PROGRESS NOTE ADULT - NS ATTEND AMEND GEN_ALL_CORE FT
Pt BS under control   TFT to be reepated as outpopt and follow upwith his outpt ENDo   free T4 levels may have worsened bc of IV iodine contrast load Pt BS under control  cont current insulin RX

## 2023-06-20 NOTE — PROGRESS NOTE ADULT - SUBJECTIVE AND OBJECTIVE BOX
Patient seen and examined.  Denies CP, SOB, N/V. Had Left Thoracostomy tube placed and removed yesterday for pleural effusion.  Also given IV lasix for pedal edema.      T(C): 37.1 (06-19-23 @ 23:45)  T(F): 98.7 (06-19-23 @ 23:45)  HR: 83 (06-19-23 @ 23:45)  BP: 125/69 (06-19-23 @ 23:45)    RR: 18 (06-19-23 @ 23:45)  SpO2: 98% (06-19-23 @ 23:45)      Physical Exam:  Gen: A&Ox3  Pulm:  CTA b/l, no r/r/w  CV:  S1S2, RRR,  Abd: +BS, soft, NT, ND  Ext:  +DP b/l, no c/c/e  Incision:  c/d/i no click, no exudate Staples to sternal incision    I&O's Detail    18 Jun 2023 07:01  -  19 Jun 2023 07:00  --------------------------------------------------------  IN:    Oral Fluid: 120 mL  Total IN: 120 mL    OUT:    Voided (mL): 301 mL  Total OUT: 301 mL    Total NET: -181 mL      19 Jun 2023 07:01  -  20 Jun 2023 01:56  --------------------------------------------------------  IN:    Oral Fluid: 860 mL  Total IN: 860 mL    OUT:    Chest Tube (mL): 980 mL    Voided (mL): 1300 mL  Total OUT: 2280 mL    Total NET: -1420 mL                              13.1   11.98 )-----------( 264      ( 19 Jun 2023 05:15 )             39.5   06-19    136  |  102  |  20.4<H>  ----------------------------<  127<H>  4.3   |  23.0  |  0.78    Ca    8.5      19 Jun 2023 05:15  Mg     2.3     06-19    TPro  x   /  Alb  x   /  TBili  1.4  /  DBili  x   /  AST  x   /  ALT  x   /  AlkPhos  x   06-19  aPTT: x    ; PT: 20.8 sec; INR: 1.78 ratio  06-19-23 @ 05:15         CAPILLARY BLOOD GLUCOSE      POCT Blood Glucose.: 121 mg/dL (19 Jun 2023 21:04)        Medications:  atorvastatin 80 milliGRAM(s) Oral at bedtime  chlorhexidine 2% Cloths 1 Application(s) Topical daily  dextrose Oral Gel 15 Gram(s) Oral once PRN  furosemide   Injectable 40 milliGRAM(s) IV Push <User Schedule>  glucagon  Injectable 1 milliGRAM(s) IntraMuscular once  insulin glargine Injectable (LANTUS) 15 Unit(s) SubCutaneous at bedtime  insulin lispro (ADMELOG) corrective regimen sliding scale   SubCutaneous Before meals and at bedtime  insulin lispro Injectable (ADMELOG) 5 Unit(s) SubCutaneous three times a day before meals  lidocaine   4% Patch 1 Patch Transdermal every 24 hours  methimazole 5 milliGRAM(s) Oral daily  metoprolol tartrate 12.5 milliGRAM(s) Oral two times a day  oxyCODONE    IR 5 milliGRAM(s) Oral every 4 hours PRN  oxyCODONE    IR 10 milliGRAM(s) Oral every 4 hours PRN  pantoprazole    Tablet 40 milliGRAM(s) Oral daily  polyethylene glycol 3350 17 Gram(s) Oral daily  psyllium Powder 1 Packet(s) Oral two times a day  senna 2 Tablet(s) Oral at bedtime  sodium chloride 0.9% lock flush 3 milliLiter(s) IV Push every 8 hours

## 2023-06-20 NOTE — PROGRESS NOTE ADULT - SUBJECTIVE AND OBJECTIVE BOX
INTERVAL EVENTS:  Follow up diabetes management    ROS: Denies pain at time of exam    MEDICATIONS  (STANDING):  atorvastatin 80 milliGRAM(s) Oral at bedtime  chlorhexidine 2% Cloths 1 Application(s) Topical daily  glucagon  Injectable 1 milliGRAM(s) IntraMuscular once  insulin glargine Injectable (LANTUS) 15 Unit(s) SubCutaneous at bedtime  insulin lispro (ADMELOG) corrective regimen sliding scale   SubCutaneous Before meals and at bedtime  insulin lispro Injectable (ADMELOG) 5 Unit(s) SubCutaneous three times a day before meals  lidocaine   4% Patch 1 Patch Transdermal every 24 hours  methimazole 5 milliGRAM(s) Oral daily  metoprolol tartrate 12.5 milliGRAM(s) Oral two times a day  pantoprazole    Tablet 40 milliGRAM(s) Oral daily  polyethylene glycol 3350 17 Gram(s) Oral daily  psyllium Powder 1 Packet(s) Oral two times a day  senna 2 Tablet(s) Oral at bedtime  sodium chloride 0.9% lock flush 3 milliLiter(s) IV Push every 8 hours    MEDICATIONS  (PRN):  dextrose Oral Gel 15 Gram(s) Oral once PRN Blood Glucose LESS THAN 70 milliGRAM(s)/deciliter  oxyCODONE    IR 10 milliGRAM(s) Oral every 4 hours PRN Severe Pain (7 - 10)  oxyCODONE    IR 5 milliGRAM(s) Oral every 4 hours PRN Moderate Pain (4 - 6)    Allergies  No Known Allergies    Vital Signs Last 24 Hrs  T(C): 36.8 (20 Jun 2023 12:00), Max: 37.3 (20 Jun 2023 06:22)  T(F): 98.3 (20 Jun 2023 12:00), Max: 99.2 (20 Jun 2023 06:22)  HR: 86 (20 Jun 2023 12:00) (82 - 89)  BP: 115/79 (20 Jun 2023 12:00) (104/69 - 134/75)  BP(mean): --  RR: 16 (20 Jun 2023 12:00) (16 - 18)  SpO2: 95% (20 Jun 2023 12:00) (90% - 98%)    Parameters below as of 20 Jun 2023 12:00  Patient On (Oxygen Delivery Method): room air      PHYSICAL EXAM:  General: No apparent distress  Neck: Supple, trachea midline, no thyromegaly  Respiratory: Lungs clear bilaterally, normal rate, effort  Cardiac: +S1, S2, no m/r/g, chest tube in place  GI: +BS, soft, non tender, non distended  Extremities: No peripheral edema, no pedal lesions  Neuro: A+O X3, no tremor      LABS:                        12.3   11.07 )-----------( 253      ( 20 Jun 2023 05:41 )             35.5     06-20    134<L>  |  100  |  17.2  ----------------------------<  115<H>  3.7   |  24.0  |  0.78    Ca    8.3<L>      20 Jun 2023 05:41  Mg     2.0     06-20    TPro  x   /  Alb  x   /  TBili  1.4  /  DBili  x   /  AST  x   /  ALT  x   /  AlkPhos  x   06-19      POCT Blood Glucose.: 112 mg/dL (06-20-23 @ 12:05)  POCT Blood Glucose.: 107 mg/dL (06-20-23 @ 08:04)  POCT Blood Glucose.: 121 mg/dL (06-19-23 @ 21:04)  POCT Blood Glucose.: 134 mg/dL (06-19-23 @ 17:08)

## 2023-06-20 NOTE — PROGRESS NOTE ADULT - PROBLEM SELECTOR PLAN 1
S/p MV Replacement 6/14/23 with Dr. Islas.  Neurologically intact.  On Coumadin daily for MVR. Follow up INR daily to dose Coumadin.   Continue Beta blocker as tolerated  S/p 2u PRBC give 6/16 and 1u PRBC given 6/17 for hypotension, low UO, AKl, and ABLA, now resolved.    Remains hemodynamically stable. Monitor H/H.   Oxygenating well, coughing and deep breathing exercises/incentive spirometry encouraged.  Continue to monitor need for diuresis, monitor strict I/Os, replenish electrolytes PRN to keep K>4 and Mg>2.   Continue with PT, increase activity as tolerated.  FS AC+HS with coverage for glycemic control. Endocrine reccomendations appreciated  Tylenol, Oxy PRN for analgesia.  Continue bowel regimen PRN constipation.   Possible discharge home later today  Case and plan to be reviewed / discussed with CT Surgery attending / team during AM rounds.

## 2023-06-21 VITALS
OXYGEN SATURATION: 95 % | TEMPERATURE: 98 F | DIASTOLIC BLOOD PRESSURE: 67 MMHG | RESPIRATION RATE: 17 BRPM | SYSTOLIC BLOOD PRESSURE: 105 MMHG | HEART RATE: 88 BPM

## 2023-06-21 LAB
ALBUMIN SERPL ELPH-MCNC: 2.9 G/DL — LOW (ref 3.3–5.2)
ALP SERPL-CCNC: 71 U/L — SIGNIFICANT CHANGE UP (ref 40–120)
ALT FLD-CCNC: 24 U/L — SIGNIFICANT CHANGE UP
ANION GAP SERPL CALC-SCNC: 10 MMOL/L — SIGNIFICANT CHANGE UP (ref 5–17)
AST SERPL-CCNC: 30 U/L — SIGNIFICANT CHANGE UP
BILIRUB SERPL-MCNC: 1.4 MG/DL — SIGNIFICANT CHANGE UP (ref 0.4–2)
BUN SERPL-MCNC: 20.8 MG/DL — HIGH (ref 8–20)
CALCIUM SERPL-MCNC: 8.5 MG/DL — SIGNIFICANT CHANGE UP (ref 8.4–10.5)
CHLORIDE SERPL-SCNC: 100 MMOL/L — SIGNIFICANT CHANGE UP (ref 96–108)
CO2 SERPL-SCNC: 25 MMOL/L — SIGNIFICANT CHANGE UP (ref 22–29)
CREAT SERPL-MCNC: 0.79 MG/DL — SIGNIFICANT CHANGE UP (ref 0.5–1.3)
EGFR: 97 ML/MIN/1.73M2 — SIGNIFICANT CHANGE UP
GLUCOSE BLDC GLUCOMTR-MCNC: 117 MG/DL — HIGH (ref 70–99)
GLUCOSE BLDC GLUCOMTR-MCNC: 205 MG/DL — HIGH (ref 70–99)
GLUCOSE SERPL-MCNC: 103 MG/DL — HIGH (ref 70–99)
HCT VFR BLD CALC: 37.5 % — LOW (ref 39–50)
HGB BLD-MCNC: 12.6 G/DL — LOW (ref 13–17)
INR BLD: 2.18 RATIO — HIGH (ref 0.88–1.16)
MAGNESIUM SERPL-MCNC: 2 MG/DL — SIGNIFICANT CHANGE UP (ref 1.6–2.6)
MCHC RBC-ENTMCNC: 30.4 PG — SIGNIFICANT CHANGE UP (ref 27–34)
MCHC RBC-ENTMCNC: 33.6 GM/DL — SIGNIFICANT CHANGE UP (ref 32–36)
MCV RBC AUTO: 90.4 FL — SIGNIFICANT CHANGE UP (ref 80–100)
PLATELET # BLD AUTO: 260 K/UL — SIGNIFICANT CHANGE UP (ref 150–400)
POTASSIUM SERPL-MCNC: 4.3 MMOL/L — SIGNIFICANT CHANGE UP (ref 3.5–5.3)
POTASSIUM SERPL-SCNC: 4.3 MMOL/L — SIGNIFICANT CHANGE UP (ref 3.5–5.3)
PROT SERPL-MCNC: 5.8 G/DL — LOW (ref 6.6–8.7)
PROTHROM AB SERPL-ACNC: 25.5 SEC — HIGH (ref 10.5–13.4)
RBC # BLD: 4.15 M/UL — LOW (ref 4.2–5.8)
RBC # FLD: 14.1 % — SIGNIFICANT CHANGE UP (ref 10.3–14.5)
SODIUM SERPL-SCNC: 135 MMOL/L — SIGNIFICANT CHANGE UP (ref 135–145)
WBC # BLD: 11.42 K/UL — HIGH (ref 3.8–10.5)
WBC # FLD AUTO: 11.42 K/UL — HIGH (ref 3.8–10.5)

## 2023-06-21 PROCEDURE — 86900 BLOOD TYPING SEROLOGIC ABO: CPT

## 2023-06-21 PROCEDURE — 83735 ASSAY OF MAGNESIUM: CPT

## 2023-06-21 PROCEDURE — 71045 X-RAY EXAM CHEST 1 VIEW: CPT

## 2023-06-21 PROCEDURE — 84132 ASSAY OF SERUM POTASSIUM: CPT

## 2023-06-21 PROCEDURE — 82435 ASSAY OF BLOOD CHLORIDE: CPT

## 2023-06-21 PROCEDURE — P9016: CPT

## 2023-06-21 PROCEDURE — 87641 MR-STAPH DNA AMP PROBE: CPT

## 2023-06-21 PROCEDURE — 31720 CLEARANCE OF AIRWAYS: CPT

## 2023-06-21 PROCEDURE — 84484 ASSAY OF TROPONIN QUANT: CPT

## 2023-06-21 PROCEDURE — 99024 POSTOP FOLLOW-UP VISIT: CPT

## 2023-06-21 PROCEDURE — 82962 GLUCOSE BLOOD TEST: CPT

## 2023-06-21 PROCEDURE — 83036 HEMOGLOBIN GLYCOSYLATED A1C: CPT

## 2023-06-21 PROCEDURE — 36415 COLL VENOUS BLD VENIPUNCTURE: CPT

## 2023-06-21 PROCEDURE — 88305 TISSUE EXAM BY PATHOLOGIST: CPT

## 2023-06-21 PROCEDURE — 80048 BASIC METABOLIC PNL TOTAL CA: CPT

## 2023-06-21 PROCEDURE — 85384 FIBRINOGEN ACTIVITY: CPT

## 2023-06-21 PROCEDURE — P9045: CPT

## 2023-06-21 PROCEDURE — 80061 LIPID PANEL: CPT

## 2023-06-21 PROCEDURE — 84443 ASSAY THYROID STIM HORMONE: CPT

## 2023-06-21 PROCEDURE — 84439 ASSAY OF FREE THYROXINE: CPT

## 2023-06-21 PROCEDURE — 80076 HEPATIC FUNCTION PANEL: CPT

## 2023-06-21 PROCEDURE — 94002 VENT MGMT INPAT INIT DAY: CPT

## 2023-06-21 PROCEDURE — 93325 DOPPLER ECHO COLOR FLOW MAPG: CPT

## 2023-06-21 PROCEDURE — 87640 STAPH A DNA AMP PROBE: CPT

## 2023-06-21 PROCEDURE — 87075 CULTR BACTERIA EXCEPT BLOOD: CPT

## 2023-06-21 PROCEDURE — C1894: CPT

## 2023-06-21 PROCEDURE — 99232 SBSQ HOSP IP/OBS MODERATE 35: CPT

## 2023-06-21 PROCEDURE — 85610 PROTHROMBIN TIME: CPT

## 2023-06-21 PROCEDURE — 71045 X-RAY EXAM CHEST 1 VIEW: CPT | Mod: 26

## 2023-06-21 PROCEDURE — 86901 BLOOD TYPING SEROLOGIC RH(D): CPT

## 2023-06-21 PROCEDURE — C1751: CPT

## 2023-06-21 PROCEDURE — 85027 COMPLETE CBC AUTOMATED: CPT

## 2023-06-21 PROCEDURE — C1769: CPT

## 2023-06-21 PROCEDURE — 85576 BLOOD PLATELET AGGREGATION: CPT

## 2023-06-21 PROCEDURE — 85730 THROMBOPLASTIN TIME PARTIAL: CPT

## 2023-06-21 PROCEDURE — 94760 N-INVAS EAR/PLS OXIMETRY 1: CPT

## 2023-06-21 PROCEDURE — 83880 ASSAY OF NATRIURETIC PEPTIDE: CPT

## 2023-06-21 PROCEDURE — C1889: CPT

## 2023-06-21 PROCEDURE — 82550 ASSAY OF CK (CPK): CPT

## 2023-06-21 PROCEDURE — 84295 ASSAY OF SERUM SODIUM: CPT

## 2023-06-21 PROCEDURE — 93458 L HRT ARTERY/VENTRICLE ANGIO: CPT

## 2023-06-21 PROCEDURE — 81001 URINALYSIS AUTO W/SCOPE: CPT

## 2023-06-21 PROCEDURE — 94010 BREATHING CAPACITY TEST: CPT

## 2023-06-21 PROCEDURE — 85025 COMPLETE CBC W/AUTO DIFF WBC: CPT

## 2023-06-21 PROCEDURE — 80053 COMPREHEN METABOLIC PANEL: CPT

## 2023-06-21 PROCEDURE — P9037: CPT

## 2023-06-21 PROCEDURE — 97116 GAIT TRAINING THERAPY: CPT

## 2023-06-21 PROCEDURE — 83605 ASSAY OF LACTIC ACID: CPT

## 2023-06-21 PROCEDURE — 85018 HEMOGLOBIN: CPT

## 2023-06-21 PROCEDURE — 36430 TRANSFUSION BLD/BLD COMPNT: CPT

## 2023-06-21 PROCEDURE — 88311 DECALCIFY TISSUE: CPT

## 2023-06-21 PROCEDURE — 82553 CREATINE MB FRACTION: CPT

## 2023-06-21 PROCEDURE — 86850 RBC ANTIBODY SCREEN: CPT

## 2023-06-21 PROCEDURE — 93312 ECHO TRANSESOPHAGEAL: CPT

## 2023-06-21 PROCEDURE — 93320 DOPPLER ECHO COMPLETE: CPT

## 2023-06-21 PROCEDURE — 93005 ELECTROCARDIOGRAM TRACING: CPT

## 2023-06-21 PROCEDURE — 82947 ASSAY GLUCOSE BLOOD QUANT: CPT

## 2023-06-21 PROCEDURE — 85014 HEMATOCRIT: CPT

## 2023-06-21 PROCEDURE — 84134 ASSAY OF PREALBUMIN: CPT

## 2023-06-21 PROCEDURE — 36600 WITHDRAWAL OF ARTERIAL BLOOD: CPT

## 2023-06-21 PROCEDURE — 87070 CULTURE OTHR SPECIMN AEROBIC: CPT

## 2023-06-21 PROCEDURE — 82330 ASSAY OF CALCIUM: CPT

## 2023-06-21 PROCEDURE — 86923 COMPATIBILITY TEST ELECTRIC: CPT

## 2023-06-21 PROCEDURE — C8929: CPT

## 2023-06-21 PROCEDURE — 82803 BLOOD GASES ANY COMBINATION: CPT

## 2023-06-21 PROCEDURE — 97530 THERAPEUTIC ACTIVITIES: CPT

## 2023-06-21 PROCEDURE — C1887: CPT

## 2023-06-21 PROCEDURE — P9100: CPT

## 2023-06-21 PROCEDURE — 93880 EXTRACRANIAL BILAT STUDY: CPT

## 2023-06-21 RX ORDER — INSULIN LISPRO 100 [IU]/ML
2 INJECTION, SUSPENSION SUBCUTANEOUS
Qty: 3 | Refills: 0
Start: 2023-06-21 | End: 2023-07-20

## 2023-06-21 RX ORDER — ASPIRIN/CALCIUM CARB/MAGNESIUM 324 MG
1 TABLET ORAL
Qty: 30 | Refills: 2
Start: 2023-06-21 | End: 2023-09-18

## 2023-06-21 RX ORDER — DULAGLUTIDE 4.5 MG/.5ML
0.75 INJECTION, SOLUTION SUBCUTANEOUS
Qty: 5 | Refills: 1
Start: 2023-06-21 | End: 2023-08-19

## 2023-06-21 RX ORDER — OXYCODONE AND ACETAMINOPHEN 5; 325 MG/1; MG/1
1 TABLET ORAL
Qty: 28 | Refills: 0
Start: 2023-06-21 | End: 2023-06-27

## 2023-06-21 RX ORDER — INSULIN LISPRO 100/ML
0 VIAL (ML) SUBCUTANEOUS
Qty: 0 | Refills: 0 | DISCHARGE

## 2023-06-21 RX ORDER — FUROSEMIDE 40 MG
1 TABLET ORAL
Qty: 7 | Refills: 0
Start: 2023-06-21 | End: 2023-06-27

## 2023-06-21 RX ORDER — ISOPROPYL ALCOHOL, BENZOCAINE .7; .06 ML/ML; ML/ML
0 SWAB TOPICAL
Qty: 100 | Refills: 1
Start: 2023-06-21

## 2023-06-21 RX ORDER — AMLODIPINE AND VALSARTAN 5; 320 MG/1; MG/1
1 TABLET, FILM COATED ORAL
Qty: 0 | Refills: 0 | DISCHARGE

## 2023-06-21 RX ORDER — WARFARIN SODIUM 2.5 MG/1
2.5 TABLET ORAL ONCE
Refills: 0 | Status: DISCONTINUED | OUTPATIENT
Start: 2023-06-21 | End: 2023-06-21

## 2023-06-21 RX ORDER — ATORVASTATIN CALCIUM 80 MG/1
1 TABLET, FILM COATED ORAL
Qty: 30 | Refills: 2
Start: 2023-06-21 | End: 2023-09-18

## 2023-06-21 RX ORDER — SENNA PLUS 8.6 MG/1
2 TABLET ORAL
Qty: 14 | Refills: 0
Start: 2023-06-21 | End: 2023-06-27

## 2023-06-21 RX ORDER — ASPIRIN/CALCIUM CARB/MAGNESIUM 324 MG
1 TABLET ORAL
Qty: 0 | Refills: 0 | DISCHARGE

## 2023-06-21 RX ORDER — ATORVASTATIN CALCIUM 80 MG/1
0 TABLET, FILM COATED ORAL
Qty: 0 | Refills: 0 | DISCHARGE

## 2023-06-21 RX ORDER — HYDROCODONE BITARTRATE AND ACETAMINOPHEN 7.5; 325 MG/15ML; MG/15ML
0 SOLUTION ORAL
Qty: 0 | Refills: 0 | DISCHARGE

## 2023-06-21 RX ORDER — DULAGLUTIDE 4.5 MG/.5ML
0 INJECTION, SOLUTION SUBCUTANEOUS
Qty: 0 | Refills: 0 | DISCHARGE

## 2023-06-21 RX ORDER — WARFARIN SODIUM 2.5 MG/1
1 TABLET ORAL
Qty: 30 | Refills: 2
Start: 2023-06-21 | End: 2023-09-18

## 2023-06-21 RX ADMIN — OXYCODONE HYDROCHLORIDE 10 MILLIGRAM(S): 5 TABLET ORAL at 09:04

## 2023-06-21 RX ADMIN — Medication 5 UNIT(S): at 12:06

## 2023-06-21 RX ADMIN — Medication 12.5 MILLIGRAM(S): at 06:52

## 2023-06-21 RX ADMIN — SODIUM CHLORIDE 3 MILLILITER(S): 9 INJECTION INTRAMUSCULAR; INTRAVENOUS; SUBCUTANEOUS at 14:39

## 2023-06-21 RX ADMIN — Medication 4: at 12:06

## 2023-06-21 RX ADMIN — LIDOCAINE 1 PATCH: 4 CREAM TOPICAL at 04:36

## 2023-06-21 RX ADMIN — Medication 650 MILLIGRAM(S): at 00:01

## 2023-06-21 RX ADMIN — Medication 1 PACKET(S): at 05:02

## 2023-06-21 RX ADMIN — PANTOPRAZOLE SODIUM 40 MILLIGRAM(S): 20 TABLET, DELAYED RELEASE ORAL at 11:14

## 2023-06-21 RX ADMIN — Medication 5 UNIT(S): at 08:31

## 2023-06-21 RX ADMIN — POLYETHYLENE GLYCOL 3350 17 GRAM(S): 17 POWDER, FOR SOLUTION ORAL at 11:14

## 2023-06-21 RX ADMIN — Medication 40 MILLIGRAM(S): at 05:01

## 2023-06-21 RX ADMIN — Medication 20 MILLIEQUIVALENT(S): at 11:14

## 2023-06-21 RX ADMIN — OXYCODONE HYDROCHLORIDE 10 MILLIGRAM(S): 5 TABLET ORAL at 08:04

## 2023-06-21 RX ADMIN — SODIUM CHLORIDE 3 MILLILITER(S): 9 INJECTION INTRAMUSCULAR; INTRAVENOUS; SUBCUTANEOUS at 05:02

## 2023-06-21 NOTE — PROGRESS NOTE ADULT - PROBLEM SELECTOR PLAN 6
Continue GI ppx with Protonix and Senna  DVT ppx with Lovenox and SCD boots  CHG while invasive lines in place  Strict glucose management for SWI ppx
Continue GI ppx with Protonix and Senna.  DVT ppx with Coumadin and SCD boots.  Strict glucose management for SWI ppx.    Dispo: Home  Plan to be discussed / reviewed with CT Surgery attending / team during AM rounds.
Continue GI ppx with Protonix and Senna.  DVT ppx with Coumadin and SCD boots.  Strict glucose management for SWI ppx.    Dispo: Home with rolling walker   Plan to be discussed / reviewed with CT Surgery attending / team during AM rounds.
Continue GI ppx with Protonix and Senna.  DVT ppx with Coumadin and SCD boots.  Strict glucose management for SWI ppx.    Dispo: Home  Plan to be discussed / reviewed with CT Surgery attending / team during AM rounds.
Continue GI ppx with Protonix and Senna.  DVT ppx with Coumadin and SCD boots.  Strict glucose management for SWI ppx.    Dispo: Home  Plan to be discussed / reviewed with CT Surgery attending / team during AM rounds.
Continue GI ppx with Protonix and Senna.  DVT ppx with Coumadin and SCD boots.  Strict glucose management for SWI ppx.    Dispo: Home with rolling walker   Plan to be discussed / reviewed with CT Surgery attending / team during AM rounds.

## 2023-06-21 NOTE — PROGRESS NOTE ADULT - SUBJECTIVE AND OBJECTIVE BOX
Subjective: Patient seen and assessed POD# 7 s/p MVR(T)   Patient states "I feel very tired."  At time of exam, patient in NAD, .d    Pertinent events of the past 24 hours: Uneventful, recovering as expected    Tele: NSR BBB 80S, PVCs     VITAL SIGNS  Vital Signs Last 24 Hrs  T(C): 36.7 (23 @ 23:52), Max: 37.3 (23 @ 06:22)  T(F): 98.1 (23 @ 23:52), Max: 99.2 (23 @ 06:22)  HR: 82 (23 @ 23:52) (82 - 89)  BP: 91/50 (23 @ 23:52) (91/50 - 136/85)  RR: 18 (23 @ 23:52) (16 - 18)  SpO2: 94% (23 @ 23:52) (90% - 97%)  on (O2)              MEDICATIONS  acetaminophen     Tablet .. 650 milliGRAM(s) Oral every 6 hours PRN  atorvastatin 80 milliGRAM(s) Oral at bedtime  dextrose Oral Gel 15 Gram(s) Oral once PRN  furosemide    Tablet 40 milliGRAM(s) Oral daily  glucagon  Injectable 1 milliGRAM(s) IntraMuscular once  insulin glargine Injectable (LANTUS) 15 Unit(s) SubCutaneous at bedtime  insulin lispro (ADMELOG) corrective regimen sliding scale   SubCutaneous Before meals and at bedtime  insulin lispro Injectable (ADMELOG) 5 Unit(s) SubCutaneous three times a day before meals  lidocaine   4% Patch 1 Patch Transdermal every 24 hours  methimazole 5 milliGRAM(s) Oral daily  metoprolol tartrate 12.5 milliGRAM(s) Oral two times a day  oxyCODONE    IR 10 milliGRAM(s) Oral every 4 hours PRN  oxyCODONE    IR 5 milliGRAM(s) Oral every 4 hours PRN  pantoprazole    Tablet 40 milliGRAM(s) Oral daily  polyethylene glycol 3350 17 Gram(s) Oral daily  potassium chloride    Tablet ER 20 milliEquivalent(s) Oral daily  psyllium Powder 1 Packet(s) Oral two times a day  senna 2 Tablet(s) Oral at bedtime  sodium chloride 0.9% lock flush 3 milliLiter(s) IV Push every 8 hours    PHYSICAL EXAM  .physical    Intake and Output   @ 07:  -   @ 07:00  --------------------------------------------------------  IN: 860 mL / OUT: 2280 mL / NET: -1420 mL     @ 07:  -   @ 02:30  --------------------------------------------------------  IN: 500 mL / OUT: 1200 mL / NET: -700 mL    Weights:  Daily     Daily Weight in k.5 (2023 06:00)  Admit Wt: Drug Dosing Weight  Height (cm): 167.6 (15 Kelvin 2023 01:17)  Weight (kg): 77.6 (15 Kelvin 2023 01:17)  BMI (kg/m2): 27.6 (15 Kelvin 2023 01:17)  BSA (m2): 1.87 (15 Kelvin 2023 01:17)    All laboratory results, radiology and medications reviewed.    LABS      134<L>  |  100  |  17.2  ----------------------------<  115<H>  3.7   |  24.0  |  0.78    Ca    8.3<L>      2023 05:41  Mg     2.0         TPro  x   /  Alb  x   /  TBili  1.4  /  DBili  x   /  AST  x   /  ALT  x   /  AlkPhos  x                                    12.3   11.07 )-----------( 253      ( 2023 05:41 )             35.5          PT/INR - ( 2023 18:20 )   PT: 24.5 sec;   INR: 2.10 ratio         PTT - ( 2023 18:20 )  PTT:32.7 sec    CAPILLARY BLOOD GLUCOSE  POCT Blood Glucose.: 111 mg/dL (2023 21:13)  POCT Blood Glucose.: 112 mg/dL (2023 17:15)  POCT Blood Glucose.: 112 mg/dL (2023 12:05)  POCT Blood Glucose.: 107 mg/dL (2023 08:04)         < from: Xray Chest 1 View- PORTABLE-Urgent (Xray Chest 1 View- PORTABLE-Urgent .) (23 @ 11:05) >    INTERPRETATION:  AP chest on 2023 at 10:23 AM. Patient had right   chest tube insertion.    Heart enlargement, sternotomy, prosthetic heart valve again noted.   Cervical spine hardware again seen.    On  earlier study there was a moderate right base effusion. The   right effusion is markedly diminished after insertion of a catheter right   chest tube. No pneumothorax.    IMPRESSION: Markedly diminished right effusion after insertion of   catheter right chest tube.    < end of copied text >    < from: TTE Echo Complete w/ Contrast w/ Doppler (23 @ 14:05) >    PHYSICIAN INTERPRETATION:  Left Ventricle:  Abnormal (paradoxical) septal motion consistent with post-operative   status.  Left Atrium: The left atrium was not well visualized.  Right Atrium: The right atrium was not well visualized.  Pericardium: The pericardial effusion is globally located around the   entire heart. There is excessive respiratory variation in the tricuspid   valve spectral Doppler velocities and excessive respiratory variation in   the mitral valve spectral Doppler velocities. There is noevidence of   cardiac tamponade. Small to moderate pericardial effusions.  Mitral Valve: The mitral valve is not well seen.  Tricuspid Valve: The tricuspid valve is not well seen. Adequate TR   velocity was not obtained to accurately assess RVSP.  Aortic Valve: The aortic valve was not well visualized.  Pulmonic Valve: The pulmonic valve was not well visualized.  Aorta: The aorta was not well visualized.  The ascending aorta was not well visualized.  Pulmonary Artery: The pulmonary artery is not well seen.  Venous: The inferior vena cava was normal sized, with respiratory size   variation greater than 50%.  In comparison to the previous echocardiogram(s): Prior examinations are   available and were reviewed for comparison purposes.      Summary:   1. Follow up echo to evaluate pericardial effusion.   2. Small to moderate pericardial effusions.    < end of copied text >       Subjective: Patient seen and assessed POD# 7 s/p MVR(T)   Patient states "I feel very tired."  At time of exam, patient in NAD, Pt denies chest pain, palpitations, dizziness, headache, shortness of breath, abdominal pain or N/V/D/C.     Pertinent events of the past 24 hours: Uneventful, recovering as expected    Tele: NSR BBB 80S, PVCs     VITAL SIGNS  Vital Signs Last 24 Hrs  T(C): 36.7 (23 @ 23:52), Max: 37.3 (23 @ 06:22)  T(F): 98.1 (23 @ 23:52), Max: 99.2 (23 @ 06:22)  HR: 82 (23 @ 23:52) (82 - 89)  BP: 91/50 (23 @ 23:52) (91/50 - 136/85)  RR: 18 (23 @ 23:52) (16 - 18)  SpO2: 94% (23 @ 23:52) (90% - 97%)  on (O2)              MEDICATIONS  acetaminophen     Tablet .. 650 milliGRAM(s) Oral every 6 hours PRN  atorvastatin 80 milliGRAM(s) Oral at bedtime  dextrose Oral Gel 15 Gram(s) Oral once PRN  furosemide    Tablet 40 milliGRAM(s) Oral daily  glucagon  Injectable 1 milliGRAM(s) IntraMuscular once  insulin glargine Injectable (LANTUS) 15 Unit(s) SubCutaneous at bedtime  insulin lispro (ADMELOG) corrective regimen sliding scale   SubCutaneous Before meals and at bedtime  insulin lispro Injectable (ADMELOG) 5 Unit(s) SubCutaneous three times a day before meals  lidocaine   4% Patch 1 Patch Transdermal every 24 hours  methimazole 5 milliGRAM(s) Oral daily  metoprolol tartrate 12.5 milliGRAM(s) Oral two times a day  oxyCODONE    IR 10 milliGRAM(s) Oral every 4 hours PRN  oxyCODONE    IR 5 milliGRAM(s) Oral every 4 hours PRN  pantoprazole    Tablet 40 milliGRAM(s) Oral daily  polyethylene glycol 3350 17 Gram(s) Oral daily  potassium chloride    Tablet ER 20 milliEquivalent(s) Oral daily  psyllium Powder 1 Packet(s) Oral two times a day  senna 2 Tablet(s) Oral at bedtime  sodium chloride 0.9% lock flush 3 milliLiter(s) IV Push every 8 hours    PHYSICAL EXAM  .physical    Intake and Output   @ 07:  -   @ 07:00  --------------------------------------------------------  IN: 860 mL / OUT: 2280 mL / NET: -1420 mL     @ 07:01  -   @ 02:30  --------------------------------------------------------  IN: 500 mL / OUT: 1200 mL / NET: -700 mL    Weights:  Daily     Daily Weight in k.5 (2023 06:00)  Admit Wt: Drug Dosing Weight  Height (cm): 167.6 (15 Kelvin 2023 01:17)  Weight (kg): 77.6 (15 Kelvin 2023 01:17)  BMI (kg/m2): 27.6 (15 Kelvin 2023 01:17)  BSA (m2): 1.87 (15 Kelvin 2023 01:17)    All laboratory results, radiology and medications reviewed.    LABS      134<L>  |  100  |  17.2  ----------------------------<  115<H>  3.7   |  24.0  |  0.78    Ca    8.3<L>      2023 05:41  Mg     2.0         TPro  x   /  Alb  x   /  TBili  1.4  /  DBili  x   /  AST  x   /  ALT  x   /  AlkPhos  x                                    12.3   11.07 )-----------( 253      ( 2023 05:41 )             35.5          PT/INR - ( 2023 18:20 )   PT: 24.5 sec;   INR: 2.10 ratio         PTT - ( 2023 18:20 )  PTT:32.7 sec    CAPILLARY BLOOD GLUCOSE  POCT Blood Glucose.: 111 mg/dL (2023 21:13)  POCT Blood Glucose.: 112 mg/dL (2023 17:15)  POCT Blood Glucose.: 112 mg/dL (2023 12:05)  POCT Blood Glucose.: 107 mg/dL (2023 08:04)         < from: Xray Chest 1 View- PORTABLE-Urgent (Xray Chest 1 View- PORTABLE-Urgent .) (23 @ 11:05) >    INTERPRETATION:  AP chest on 2023 at 10:23 AM. Patient had right   chest tube insertion.    Heart enlargement, sternotomy, prosthetic heart valve again noted.   Cervical spine hardware again seen.    On  earlier study there was a moderate right base effusion. The   right effusion is markedly diminished after insertion of a catheter right   chest tube. No pneumothorax.    IMPRESSION: Markedly diminished right effusion after insertion of   catheter right chest tube.    < end of copied text >    < from: TTE Echo Complete w/ Contrast w/ Doppler (23 @ 14:05) >    PHYSICIAN INTERPRETATION:  Left Ventricle:  Abnormal (paradoxical) septal motion consistent with post-operative   status.  Left Atrium: The left atrium was not well visualized.  Right Atrium: The right atrium was not well visualized.  Pericardium: The pericardial effusion is globally located around the   entire heart. There is excessive respiratory variation in the tricuspid   valve spectral Doppler velocities and excessive respiratory variation in   the mitral valve spectral Doppler velocities. There is noevidence of   cardiac tamponade. Small to moderate pericardial effusions.  Mitral Valve: The mitral valve is not well seen.  Tricuspid Valve: The tricuspid valve is not well seen. Adequate TR   velocity was not obtained to accurately assess RVSP.  Aortic Valve: The aortic valve was not well visualized.  Pulmonic Valve: The pulmonic valve was not well visualized.  Aorta: The aorta was not well visualized.  The ascending aorta was not well visualized.  Pulmonary Artery: The pulmonary artery is not well seen.  Venous: The inferior vena cava was normal sized, with respiratory size   variation greater than 50%.  In comparison to the previous echocardiogram(s): Prior examinations are   available and were reviewed for comparison purposes.      Summary:   1. Follow up echo to evaluate pericardial effusion.   2. Small to moderate pericardial effusions.    < end of copied text >       Subjective: Patient seen and assessed POD# 7 s/p MVR(T)   Patient states "I feel very tired."  At time of exam, patient in NAD, Pt denies chest pain, palpitations, dizziness, headache, shortness of breath, abdominal pain or N/V/D/C.     Pertinent events of the past 24 hours: Uneventful, recovering as expected    Tele: NSR BBB 80S, PVCs     VITAL SIGNS  Vital Signs Last 24 Hrs  T(C): 36.7 (23 @ 23:52), Max: 37.3 (23 @ 06:22)  T(F): 98.1 (23 @ 23:52), Max: 99.2 (23 @ 06:22)  HR: 82 (23 @ 23:52) (82 - 89)  BP: 91/50 (23 @ 23:52) (91/50 - 136/85)  RR: 18 (23 @ 23:52) (16 - 18)  SpO2: 94% (23 @ 23:52) (90% - 97%)  on (O2)              MEDICATIONS  acetaminophen     Tablet .. 650 milliGRAM(s) Oral every 6 hours PRN  atorvastatin 80 milliGRAM(s) Oral at bedtime  dextrose Oral Gel 15 Gram(s) Oral once PRN  furosemide    Tablet 40 milliGRAM(s) Oral daily  glucagon  Injectable 1 milliGRAM(s) IntraMuscular once  insulin glargine Injectable (LANTUS) 15 Unit(s) SubCutaneous at bedtime  insulin lispro (ADMELOG) corrective regimen sliding scale   SubCutaneous Before meals and at bedtime  insulin lispro Injectable (ADMELOG) 5 Unit(s) SubCutaneous three times a day before meals  lidocaine   4% Patch 1 Patch Transdermal every 24 hours  methimazole 5 milliGRAM(s) Oral daily  metoprolol tartrate 12.5 milliGRAM(s) Oral two times a day  oxyCODONE    IR 10 milliGRAM(s) Oral every 4 hours PRN  oxyCODONE    IR 5 milliGRAM(s) Oral every 4 hours PRN  pantoprazole    Tablet 40 milliGRAM(s) Oral daily  polyethylene glycol 3350 17 Gram(s) Oral daily  potassium chloride    Tablet ER 20 milliEquivalent(s) Oral daily  psyllium Powder 1 Packet(s) Oral two times a day  senna 2 Tablet(s) Oral at bedtime  sodium chloride 0.9% lock flush 3 milliLiter(s) IV Push every 8 hours    PHYSICAL EXAM  Constitutional: NAD  Neuro: A+O x 3, non-focal, speech clear and intact  CV: regular rate, regular rhythm, +S1S2, no murmurs or rub  Pulm/chest: lung sounds CTA and equal bilaterally, no accessory muscle use noted  Abd: +BS, soft, NT, ND  Ext: RICO x 4, no C/C/E  Skin: warm, well perfused  Psych: calm, appropriate affect  Incision: midline sternal incision open to air, +staples, well approximated, sternum stable, no audible sternal click,    Intake and Output   @ 07:  -   @ 07:00  --------------------------------------------------------  IN: 860 mL / OUT: 2280 mL / NET: -1420 mL     @ 07:01  -   @ 02:30  --------------------------------------------------------  IN: 500 mL / OUT: 1200 mL / NET: -700 mL    Weights:  Daily     Daily Weight in k.5 (2023 06:00)  Admit Wt: Drug Dosing Weight  Height (cm): 167.6 (15 Kelvin 2023 01:17)  Weight (kg): 77.6 (15 Kelvin 2023 01:17)  BMI (kg/m2): 27.6 (15 Kelvin 2023 01:17)  BSA (m2): 1.87 (15 Kelvin 2023 01:17)    All laboratory results, radiology and medications reviewed.    LABS      134<L>  |  100  |  17.2  ----------------------------<  115<H>  3.7   |  24.0  |  0.78    Ca    8.3<L>      2023 05:41  Mg     2.0         TPro  x   /  Alb  x   /  TBili  1.4  /  DBili  x   /  AST  x   /  ALT  x   /  AlkPhos  x                                    12.3   11.07 )-----------( 253      ( 2023 05:41 )             35.5          PT/INR - ( 2023 18:20 )   PT: 24.5 sec;   INR: 2.10 ratio         PTT - ( 2023 18:20 )  PTT:32.7 sec    CAPILLARY BLOOD GLUCOSE  POCT Blood Glucose.: 111 mg/dL (2023 21:13)  POCT Blood Glucose.: 112 mg/dL (2023 17:15)  POCT Blood Glucose.: 112 mg/dL (2023 12:05)  POCT Blood Glucose.: 107 mg/dL (2023 08:04)         < from: Xray Chest 1 View- PORTABLE-Urgent (Xray Chest 1 View- PORTABLE-Urgent .) (23 @ 11:05) >    INTERPRETATION:  AP chest on 2023 at 10:23 AM. Patient had right   chest tube insertion.    Heart enlargement, sternotomy, prosthetic heart valve again noted.   Cervical spine hardware again seen.    On  earlier study there was a moderate right base effusion. The   right effusion is markedly diminished after insertion of a catheter right   chest tube. No pneumothorax.    IMPRESSION: Markedly diminished right effusion after insertion of   catheter right chest tube.    < end of copied text >    < from: TTE Echo Complete w/ Contrast w/ Doppler (23 @ 14:05) >    PHYSICIAN INTERPRETATION:  Left Ventricle:  Abnormal (paradoxical) septal motion consistent with post-operative   status.  Left Atrium: The left atrium was not well visualized.  Right Atrium: The right atrium was not well visualized.  Pericardium: The pericardial effusion is globally located around the   entire heart. There is excessive respiratory variation in the tricuspid   valve spectral Doppler velocities and excessive respiratory variation in   the mitral valve spectral Doppler velocities. There is noevidence of   cardiac tamponade. Small to moderate pericardial effusions.  Mitral Valve: The mitral valve is not well seen.  Tricuspid Valve: The tricuspid valve is not well seen. Adequate TR   velocity was not obtained to accurately assess RVSP.  Aortic Valve: The aortic valve was not well visualized.  Pulmonic Valve: The pulmonic valve was not well visualized.  Aorta: The aorta was not well visualized.  The ascending aorta was not well visualized.  Pulmonary Artery: The pulmonary artery is not well seen.  Venous: The inferior vena cava was normal sized, with respiratory size   variation greater than 50%.  In comparison to the previous echocardiogram(s): Prior examinations are   available and were reviewed for comparison purposes.      Summary:   1. Follow up echo to evaluate pericardial effusion.   2. Small to moderate pericardial effusions.    < end of copied text >

## 2023-06-21 NOTE — PROGRESS NOTE ADULT - ASSESSMENT
68M with PMHx CAD s/p PCI of RCA 2/16/2021, HTN, HLD, DM, HCV s/p treatment and cardiac murmur with MR. He saw Dr. Desouza for evaluation of moderate to severe MR and was referred to Dr. Islas for mitral clip evaluation. Underwent MVR with Dr. Islas.  On admission a1c 6.3%, was on Trulicity weekly at home (insulin in past but not for last 2 months), sees Dr. Dhaliwal.    1. Controlled DM, a1c 6.3%  -  continue Lantus 15 units qhs  - Premeal admelog 5 units tid with meals  -Once ready for discharge, may send him home on  home meds, trulicity weekly injection and humalog prn , he checks sugars 3 x a day and takes humalog as needed, to see his endo as outpatient to adjust home meds.    2. Hyperthyroidism  - TFTs within normal limits  - Continue home dose methimazole 5mg daily  - Repeat TFTs as outpatient in 4-6 weeks    3. Mitral regurgitation  - S/p MVR, care per CT surgery
68M, pmhx CAD s/p PCI of RCA 2/16/2021, HTN, HLD, DM, hyperthyroidism, hemochromatosis, neck surgery s/p PCDF & ACDF, appendectomy, hernia repair, Right shoulder dislocation s/p repair, Hepatitis C virus s/p treatment, known MR initially planned to be evaluated for mitral clip, recent admission in April for syncope secondary to orthostatic hypotension and BELLO in setting of dehydration, found to have strep salivarius/vestilbularis bacteremia, QUINCY with severe MR, inconclusive for endocarditis, pt tx with 4 weeks ceftriaxone, now s/p repeat QUINCY 6/12 to assess valve, found to have severe MR with multiple mobile echodensities. 6/14 underwent MVR with Dr. Islas. Postop course significant for ABLA (stable s/p PRBC transfusions), hypotension (resolved s/p fluid resuscitation and BELLO (resolved). Left pleural effusion drained 6/19. 
68M with PMHx CAD s/p PCI of RCA 2/16/2021, HTN, HLD, DM, HCV s/p treatment and cardiac murmur with MR. He saw Dr. Desouza for evaluation of moderate to severe MR and was referred to Dr. Islas for mitral clip evaluation. Underwent MVR with Dr. Islas.  On admission a1c 6.3%, was on Trulicity weekly at home (insulin in past but not for last 2 months), sees Dr. Dhaliwal.    1. Controlled DM, a1c 6.3%  - Continue lantus 15 units qhs  - Premeal admelog 5 units tid with meals  - For discharge, recommend previous home regimen (trulicity weekly, humalog prn, follows with Dr Dhaliwal)    2. Hyperthyroidism  - TFTs within normal limits  - Continue home dose methimazole 5mg daily  - Repeat TFTs as outpatient in 4-6 weeks    3. Mitral regurgitation  - S/p MVR, care per CT surgery    
68M with PMHx CAD s/p PCI of RCA 2/16/2021, HTN, HLD, DM, HCV s/p treatment and cardiac murmur with MR. He saw Dr. Desouza for evaluation of moderate to severe MR and was referred to Dr. Islas for mitral clip evaluation. Underwent MVR with Dr. Islas.  On admission a1c 6.3%, was on Trulicity weekly at home (insulin in past but not for last 2 months), sees Dr. Dhaliwal.    1. Controlled DM, a1c 6.3%  - Continue lantus 15 units qhs  - Premeal admelog 5 units tid with meals  - For discharge, recommend previous home regimen (trulicity weekly, humalog prn, follows with Dr Dhaliwal)    2. Hyperthyroidism  - TFTs within normal limits  - Continue home dose methimazole 5mg daily  - Repeat TFTs as outpatient in 4-6 weeks with his outpatient endocrinologist     3. Mitral regurgitation  - S/p MVR, care per CT surgery  
68M with PMHx CAD s/p PCI of RCA 2/16/2021, HTN, HLD, DM, HCV s/p treatment and cardiac murmur with MR. He saw Dr. Desouza for evaluation of moderate to severe MR and was referred to Dr. Islas for mitral clip evaluation. Underwent MVR with Dr. Islas.  On admission a1c 6.3%, was on Trulicity weekly at home (insulin in past but not for last 2 months), sees Dr. Dhaliwal.    1. Controlled DM, a1c 6.3%  - Transitioned off insulin gtt at midnight  - Lantus 15 units qhs  - Premeal admelog 5 units tid with meals    2. Hyperthyroidism  - TFTs within normal limits  - Continue home dose methimazole 5mg daily  - Repeat TFTs as outpatient in 4-6 weeks    3. Mitral regurgitation  - S/p MVR, care per CT surgery
68M, pmhx CAD s/p PCI of RCA 2/16/2021, HTN, HLD, DM, hyperthyroidism, hemochromatosis, neck surgery s/p PCDF & ACDF, appendectomy, hernia repair, R shoulder dislocation s/p repair, HCV s/p treatment, known MR initially planned to be evaluated for mitral clip, recent admission in April for syncope secondary to orthostatic hypotension and BELLO in setting of dehydration, found to have strep salivarius/vestilbularis bacteremia, QUINCY with severe MR, inconclusive for endocarditis, pt tx with 4 weeks ceftriaxone, now s/p repeat QUINCY 6/12 to assess valve, found to have severe MR with multiple mobile echodensities. 
68M, pmhx CAD s/p PCI of RCA 2/16/2021, HTN, HLD, DM, hyperthyroidism, hemochromatosis, neck surgery s/p PCDF & ACDF, appendectomy, hernia repair, Right shoulder dislocation s/p repair, Hepatitis C virus s/p treatment, known MR initially planned to be evaluated for mitral clip, recent admission in April for syncope secondary to orthostatic hypotension and BELLO in setting of dehydration, found to have strep salivarius/vestilbularis bacteremia, QUINCY with severe MR, inconclusive for endocarditis, pt tx with 4 weeks ceftriaxone, now s/p repeat QUINCY 6/12 to assess valve, found to have severe MR with multiple mobile echodensities. 6/14 underwent MVR with Dr. Islas. Postop course significant for ABLA, hypotension, and BELLO.
68M with PMHx CAD s/p PCI of RCA 2/16/2021, HTN, HLD, DM, HCV s/p treatment and cardiac murmur with MR. He saw Dr. Desouza for evaluation of moderate to severe MR and was referred to Dr. Islas for mitral clip evaluation. Underwent MVR with Dr. Islas.  On admission a1c 6.3%, was on Trulicity weekly at home (insulin in past but not for last 2 months), sees Dr. Dahliwal.    1. Controlled DM, a1c 6.3% - glucoses controlled  - Continue lantus 15 units qhs  - Premeal admelog 5 units tid with meals  - For discharge, recommend previous home regimen (trulicity weekly, humalog prn, follows with Dr Dhaliwal)    2. Hyperthyroidism  - TFTs within normal limits  - Continue home dose methimazole 5mg daily  - Repeat TFTs as outpatient in 4-6 weeks with his outpatient endocrinologist     3. Mitral regurgitation  - S/p MVR, care per CT surgery    
68M, pmhx CAD s/p PCI of RCA 2/16/2021, HTN, HLD, DM, hyperthyroidism, hemochromatosis, neck surgery s/p PCDF & ACDF, appendectomy, hernia repair, R shoulder dislocation s/p repair, HCV s/p treatment, known MR initially planned to be evaluated for mitral clip, recent admission in April for syncope secondary to orthostatic hypotension and BELLO in setting of dehydration, found to have strep salivarius/vestilbularis bacteremia, QUINCY with severe MR, inconclusive for endocarditis, pt tx with 4 weeks ceftriaxone, now s/p repeat QUINCY 6/12 to assess valve, found to have severe MR with multiple mobile echodensities.
Procedure: Left heart catheterization    1. S/P LHC: Nonobstructive CAD  ·	Remove radial band at: 5:30PM  ·	Wrist precautions explained.  ·	GDMT:   ·	     DAPT: Held as per CT surgery  ·	     Statin: Lipitor 80 mg daily  ·	     Beta Blocker: Toprol 100 mg daily  ·	     Other Antianginals: Amlodipine 10 mg daily  ·	     Aggressive cardiovascular risk reduction and lifestyle modification.    2. Goal Non-HDL < 80, LDL < 55  ·	Lipid Panel: At goal  ·	Statin: Lipitor 80 mg daily  ·	Other: N/A    3. Severe MR  ·	MVR tomorrow      
68M, pmhx CAD s/p PCI of RCA 2/16/2021, HTN, HLD, DM, hyperthyroidism, hemochromatosis, neck surgery s/p PCDF & ACDF, appendectomy, hernia repair, R shoulder dislocation s/p repair, Hepatitis C virus s/p treatment, known MR initially planned to be evaluated for mitral clip, recent admission in April for syncope secondary to orthostatic hypotension and BELLO in setting of dehydration, found to have strep salivarius/vestilbularis bacteremia, QUINCY with severe MR, inconclusive for endocarditis, pt tx with 4 weeks ceftriaxone, now s/p repeat QUINCY 6/12 to assess valve, found to have severe MR with multiple mobile echodensities. 6/14 underwent MVR with Dr. Islas. Postop course significant for ABLA. 
68M, pmhx CAD s/p PCI of RCA 2/16/2021, HTN, HLD, DM, hyperthyroidism, hemochromatosis, neck surgery s/p PCDF & ACDF, appendectomy, hernia repair, R shoulder dislocation s/p repair, HCV s/p treatment, known MR initially planned to be evaluated for mitral clip, recent admission in April for syncope secondary to orthostatic hypotension and BELLO in setting of dehydration, found to have strep salivarius/vestilbularis bacteremia, QUINCY with severe MR, inconclusive for endocarditis, pt tx with 4 weeks ceftriaxone, now s/p repeat QUINCY 6/12 to assess valve, found to have severe MR with multiple mobile echodensities. 6/14 underwent MVR with Dr. Islas
68M, pmhx CAD s/p PCI of RCA 2/16/2021, HTN, HLD, DM, hyperthyroidism, hemochromatosis, neck surgery s/p PCDF & ACDF, appendectomy, hernia repair, Right shoulder dislocation s/p repair, Hepatitis C virus s/p treatment, known MR initially planned to be evaluated for mitral clip, recent admission in April for syncope secondary to orthostatic hypotension and BELLO in setting of dehydration, found to have strep salivarius/vestilbularis bacteremia, QUINCY with severe MR, inconclusive for endocarditis, pt tx with 4 weeks ceftriaxone, now s/p repeat QUINCY 6/12 to assess valve, found to have severe MR with multiple mobile echodensities. 6/14 underwent MVR with Dr. Islas. Postop course significant for ABLA (stable s/p PRBC transfusions), hypotension (resolved s/p fluid resuscitation and BELLO (resolved). Left pleural effusion drained 6/19. TTE 6/20 demonstrating small to moderate pericardial effusion 
68M, pmhx CAD s/p PCI of RCA 2/16/2021, HTN, HLD, DM, hyperthyroidism, hemochromatosis, neck surgery s/p PCDF & ACDF, appendectomy, hernia repair, Right shoulder dislocation s/p repair, Hepatitis C virus s/p treatment, known MR initially planned to be evaluated for mitral clip, recent admission in April for syncope secondary to orthostatic hypotension and BELLO in setting of dehydration, found to have strep salivarius/vestilbularis bacteremia, QUINCY with severe MR, inconclusive for endocarditis, pt tx with 4 weeks ceftriaxone, now s/p repeat QUINCY 6/12 to assess valve, found to have severe MR with multiple mobile echodensities. 6/14 underwent MVR with Dr. Islas. Postop course significant for ABLA (stable s/p PRBC transfusions), hypotension (resolved s/p fluid resuscitation and BELLO (resolved).
68M, pmhx CAD s/p PCI of RCA 2/16/2021, HTN, HLD, DM, hyperthyroidism, hemochromatosis, neck surgery s/p PCDF & ACDF, appendectomy, hernia repair, Right shoulder dislocation s/p repair, Hepatitis C virus s/p treatment, known MR initially planned to be evaluated for mitral clip, recent admission in April for syncope secondary to orthostatic hypotension and BELLO in setting of dehydration, found to have strep salivarius/vestilbularis bacteremia, QUINCY with severe MR, inconclusive for endocarditis, pt tx with 4 weeks ceftriaxone, now s/p repeat QUINCY 6/12 to assess valve, found to have severe MR with multiple mobile echodensities. 6/14 underwent MVR with Dr. Islas. Postop course significant for ABLA (stable s/p PRBC transfusions), hypotension (resolved s/p fluid resusitation), and BELLO (resolved).

## 2023-06-21 NOTE — PROGRESS NOTE ADULT - PROBLEM SELECTOR PROBLEM 2
Type 2 diabetes mellitus with other circulatory complication, with long-term current use of insulin

## 2023-06-21 NOTE — PROGRESS NOTE ADULT - PROBLEM SELECTOR PLAN 3
Continue high dose statin (continuing home dose atorvastatin 80mg)
Continue high dose statin (continuing home dose atorvastatin 80mg).
Continue high dose statin (continuing home dose atorvastatin 80mg).
Continue high dose statin (continuing home dose atorvastatin 80mg)
Continue high dose statin (continuing home dose atorvastatin 80mg).

## 2023-06-21 NOTE — PROGRESS NOTE ADULT - PROBLEM SELECTOR PLAN 1
S/p MV Replacement 6/14/23 with Dr. Islas.  Neurologically intact.  On Coumadin daily for MVR. Follow up INR daily to dose Coumadin.   Continue Beta blocker as tolerated  S/p 2u PRBC give 6/16 and 1u PRBC given 6/17 for hypotension, low UO, AKl, and ABLA, now resolved.    Remains hemodynamically stable. Monitor H/H.   Oxygenating well, coughing and deep breathing exercises/incentive spirometry encouraged.  Continue to monitor need for diuresis, monitor strict I/Os, replenish electrolytes PRN to keep K>4 and Mg>2.   Continue with PT, increase activity as tolerated.  FS AC+HS with coverage for glycemic control. Endocrine recommendations appreciated  Tylenol, Oxy PRN for analgesia.  Continue bowel regimen PRN constipation.   Pre discharge TTE with small to moderate pericardial effusion- Dr. Islas aware. Will continue AC at this time.   Repeat TTE planned for later today  Possible discharge home later today  Case and plan to be reviewed / discussed with CT Surgery attending / team during AM rounds.

## 2023-06-21 NOTE — PROGRESS NOTE ADULT - PROBLEM SELECTOR PLAN 5
HCT 25 on POD #1 in setting of hypotension.  S/p 2u PRBC give 6/16 and 1u PRBC given 6/17 for persistent hypotension, low UO, new BELLO on labs with persistent ABLA, now resolved.
HCT 25 on POD #1 in setting of hypotension  PRBC given - will repeat and trend CBC  Mediastinal chest tube output minimal   Start anemia meds as needed
HCT 25 on POD #1 in setting of hypotension.  S/p 2u PRBC give 6/16 and 1u PRBC given now for persistent hypotension, low UO, new BELLO on labs with persistent ABLA.   Start anemia meds as needed.
HCT 25 on POD #1 in setting of hypotension.  S/p 2u PRBC give 6/16 and 1u PRBC given 6/17 for persistent hypotension, low UO, new BELLO on labs with persistent ABLA, now resolved.
HCT 25 on POD #1 in setting of hypotension.  S/p 2u PRBC give 6/16 and 1u PRBC given 6/17 for persistent hypotension, low UO, new BELLO on labs with persistent ABLA, now resolved.
Lovenox and SCDs for DVT prophylaxis  Protonix for GI prophylaxis  continue with bowel regimen   Antibiotics for SWI PPX
HCT 25 on POD #1 in setting of hypotension.  S/p 2u PRBC give 6/16 and 1u PRBC given 6/17 for persistent hypotension, low UO, new BELLO on labs with persistent ABLA, now resolved.    Start anemia meds as needed.

## 2023-06-21 NOTE — PROGRESS NOTE ADULT - PROBLEM SELECTOR PROBLEM 5
Acute blood loss anemia
Prophylactic measure

## 2023-06-21 NOTE — PROGRESS NOTE ADULT - SUBJECTIVE AND OBJECTIVE BOX
INTERVAL EVENTS:  Follow up diabetes management    ROS: Denies chest pain, sob, abd pain.     MEDICATIONS  (STANDING):  atorvastatin 80 milliGRAM(s) Oral at bedtime  furosemide    Tablet 40 milliGRAM(s) Oral daily  glucagon  Injectable 1 milliGRAM(s) IntraMuscular once  insulin glargine Injectable (LANTUS) 15 Unit(s) SubCutaneous at bedtime  insulin lispro (ADMELOG) corrective regimen sliding scale   SubCutaneous Before meals and at bedtime  insulin lispro Injectable (ADMELOG) 5 Unit(s) SubCutaneous three times a day before meals  lidocaine   4% Patch 1 Patch Transdermal every 24 hours  methimazole 5 milliGRAM(s) Oral daily  metoprolol tartrate 12.5 milliGRAM(s) Oral two times a day  pantoprazole    Tablet 40 milliGRAM(s) Oral daily  polyethylene glycol 3350 17 Gram(s) Oral daily  potassium chloride    Tablet ER 20 milliEquivalent(s) Oral daily  psyllium Powder 1 Packet(s) Oral two times a day  senna 2 Tablet(s) Oral at bedtime  sodium chloride 0.9% lock flush 3 milliLiter(s) IV Push every 8 hours  warfarin 2.5 milliGRAM(s) Oral once    MEDICATIONS  (PRN):  acetaminophen     Tablet .. 650 milliGRAM(s) Oral every 6 hours PRN Mild Pain (1 - 3)  dextrose Oral Gel 15 Gram(s) Oral once PRN Blood Glucose LESS THAN 70 milliGRAM(s)/deciliter  oxyCODONE    IR 10 milliGRAM(s) Oral every 4 hours PRN Severe Pain (7 - 10)  oxyCODONE    IR 5 milliGRAM(s) Oral every 4 hours PRN Moderate Pain (4 - 6)    Allergies  No Known Allergies    Vital Signs Last 24 Hrs  T(C): 36.6 (21 Jun 2023 07:33), Max: 37 (20 Jun 2023 19:43)  T(F): 97.8 (21 Jun 2023 07:33), Max: 98.6 (20 Jun 2023 19:43)  HR: 88 (21 Jun 2023 07:33) (78 - 88)  BP: 105/67 (21 Jun 2023 07:33) (91/50 - 136/85)  BP(mean): 102 (20 Jun 2023 16:39) (102 - 102)  RR: 17 (21 Jun 2023 07:33) (16 - 18)  SpO2: 95% (21 Jun 2023 07:33) (93% - 97%)    Parameters below as of 21 Jun 2023 07:33  Patient On (Oxygen Delivery Method): room air      PHYSICAL EXAM:  General: No apparent distress  Neck: Supple, trachea midline, no thyromegaly  Respiratory: Lungs clear bilaterally, normal rate, effort  Cardiac: +S1, S2, no m/r/g  GI: +BS, soft, non tender, non distended  Extremities: No peripheral edema, no pedal lesions  Neuro: A+O X3, no tremor      LABS:                        12.6   11.42 )-----------( 260      ( 21 Jun 2023 05:06 )             37.5     06-21    135  |  100  |  20.8<H>  ----------------------------<  103<H>  4.3   |  25.0  |  0.79    Ca    8.5      21 Jun 2023 05:06  Mg     2.0     06-21    TPro  5.8<L>  /  Alb  2.9<L>  /  TBili  1.4  /  DBili  x   /  AST  30  /  ALT  24  /  AlkPhos  71  06-21    Urinalysis Basic - ( 21 Jun 2023 05:06 )    Color: x / Appearance: x / SG: x / pH: x  Gluc: 103 mg/dL / Ketone: x  / Bili: x / Urobili: x   Blood: x / Protein: x / Nitrite: x   Leuk Esterase: x / RBC: x / WBC x   Sq Epi: x / Non Sq Epi: x / Bacteria: x      POCT Blood Glucose.: 205 mg/dL (06-21-23 @ 12:02)  POCT Blood Glucose.: 117 mg/dL (06-21-23 @ 08:28)  POCT Blood Glucose.: 111 mg/dL (06-20-23 @ 21:13)  POCT Blood Glucose.: 112 mg/dL (06-20-23 @ 17:15)    Thyroid Stimulating Hormone, Serum: 1.71 uIU/mL (06-12-23 @ 16:00)  Free Thyroxine, Serum: 1.2 ng/dL (06-12-23 @ 16:00)

## 2023-06-21 NOTE — PROGRESS NOTE ADULT - PROBLEM SELECTOR PLAN 2
Strict BG control for sternal wound infection prophylaxis.  Transitioned to Lantus, premeal insulin, and sliding scale insulin AC/HS.  Endocrine following.  Diabetes education.
Strict BG control for sternal wound infection prophylaxis.  Transitioned to premeal, BANDAR ACHS, and lantus per protocol  Endocrine following  Diabetes education consult in place
Continue insulin gtt  transition per protocol
Strict BG control for sternal wound infection prophylaxis.  Transitioned to Lantus, pre meal insulin, and sliding scale insulin AC/HS.  Endocrine following.  Diabetes education.

## 2023-06-21 NOTE — PROGRESS NOTE ADULT - PROBLEM SELECTOR PROBLEM 1
Severe mitral regurgitation
No

## 2023-06-22 ENCOUNTER — NON-APPOINTMENT (OUTPATIENT)
Age: 68
End: 2023-06-22

## 2023-06-23 ENCOUNTER — APPOINTMENT (OUTPATIENT)
Dept: CARE COORDINATION | Facility: HOME HEALTH | Age: 68
End: 2023-06-23
Payer: MEDICARE

## 2023-06-23 PROBLEM — I25.118 ATHEROSCLEROTIC HEART DISEASE OF NATIVE CORONARY ARTERY WITH OTHER FORMS OF ANGINA PECTORIS: Chronic | Status: ACTIVE | Noted: 2023-06-12

## 2023-06-23 PROBLEM — E78.2 MIXED HYPERLIPIDEMIA: Chronic | Status: ACTIVE | Noted: 2023-06-12

## 2023-06-23 PROBLEM — E83.119 HEMOCHROMATOSIS, UNSPECIFIED: Chronic | Status: ACTIVE | Noted: 2023-06-12

## 2023-06-23 PROBLEM — I10 ESSENTIAL (PRIMARY) HYPERTENSION: Chronic | Status: ACTIVE | Noted: 2023-06-12

## 2023-06-23 PROBLEM — E11.59 TYPE 2 DIABETES MELLITUS WITH OTHER CIRCULATORY COMPLICATIONS: Chronic | Status: ACTIVE | Noted: 2023-06-12

## 2023-06-23 PROBLEM — B18.2 CHRONIC VIRAL HEPATITIS C: Chronic | Status: ACTIVE | Noted: 2023-06-12

## 2023-06-23 PROCEDURE — 99024 POSTOP FOLLOW-UP VISIT: CPT

## 2023-06-26 LAB — SURGICAL PATHOLOGY STUDY: SIGNIFICANT CHANGE UP

## 2023-06-26 RX ORDER — METHIMAZOLE 5 MG/1
5 TABLET ORAL DAILY
Refills: 0 | Status: ACTIVE | COMMUNITY
Start: 2023-06-26

## 2023-06-26 RX ORDER — SENNOSIDES 8.6 MG/1
8.6 TABLET, COATED ORAL
Refills: 0 | Status: ACTIVE | COMMUNITY
Start: 2023-06-26

## 2023-06-26 NOTE — HISTORY OF PRESENT ILLNESS
[FreeTextEntry1] : FOLLOW YOUR HEART HOME VISIT-[facility]\par Telehealth Home Visit made to  Mr. Palmer for post discharge transitional care management and post follow up.\par Patient of Dr. Islas s/p  MVR  Discharge on 6/21/23 from [facility].\par \par Mr. Palmer is a 68 year old male with  PMH CAD s/p PCI of RCA 2/16/2021, HTN, HLD, DM II, hyperthyroidism, hemochromatosis, neck surgery s/p PCDF & ACDF, appendectomy, hernia repair, Right shoulder dislocation s/p repair, Hepatitis C virus s/p treatment, known MR initially planned to be evaluated for mitral clip, recent admission in April for syncope secondary to orthostatic hypotension and BELLO in setting of dehydration, found to have strep salivarius / vestilbularis bacteremia, QUINCY at that time with severe MR and inconclusive for endocarditis. Patient was treated with 4 weeks ceftriaxone and had repeat QUINCY 6/12 to assess valve which showed severe MR with multiple mobile echo densities. Patient underwent MV Replacement (tissue) with Dr. Islas 6/14/23. Postop course significant for acute blood loss anemia (stable s/p PRBC transfusions), hypotension (resolved s/p fluid resuscitation), and BELLO (resolved). Pt hemodynamically stable and ambulating well. Pt. discharge to home on 6/21/23.   Pt. seen for follow up s/p hospitalization. Denies any new complaints/issues at this time. Compliant with all medications as per d/c order with +teach back.   Patient has scheduled follow up appointment. Reminded of CN role and contact number was provided to the patient.  Advised to call with any questions/concerns, verbalized understanding.\par \par

## 2023-06-26 NOTE — PHYSICAL EXAM
[FreeTextEntry1] : Upon visualization MT incision with steri strips & CT incision CDI & RUT, small amount of serous sang drainage from CT incision, RLLE edema>LLE

## 2023-06-29 NOTE — COUNSELING
[Hygeine (Including Daily Shower)] : hygeine (including daily shower) [Importance of Regular Medical Follow-Up] : the importance of regular medical follow-up [No Heavy Lifting] : no heavy lifting (>15-20 lb. for 1 month or 25 lb. for 3 months from date of surgery) [Blood Pressure Control] : blood pressure control [Weight Management] : weight management [S/S of infection] : signs and symptoms of infection (and to whom it should be reported) [Progressive Ambulation/Activity] : progressive ambulation/activity [Medication/Vitamin/Herb/Food Interaction] : medication/vitamin/herb/food interaction [SBE antibiotic prophylaxis] : SBE antibiotic prophylaxis was recommended [Low Fat/Low Cholesterol Diet] : low fat/low cholesterol diet [Coumadin Management] : Coumadin management [Stress Management] : stress management

## 2023-06-30 ENCOUNTER — APPOINTMENT (OUTPATIENT)
Dept: CARDIOTHORACIC SURGERY | Facility: CLINIC | Age: 68
End: 2023-06-30
Payer: MEDICARE

## 2023-06-30 ENCOUNTER — OUTPATIENT (OUTPATIENT)
Dept: OUTPATIENT SERVICES | Facility: HOSPITAL | Age: 68
LOS: 1 days | End: 2023-06-30
Payer: COMMERCIAL

## 2023-06-30 VITALS
HEART RATE: 85 BPM | TEMPERATURE: 98.3 F | DIASTOLIC BLOOD PRESSURE: 88 MMHG | RESPIRATION RATE: 16 BRPM | SYSTOLIC BLOOD PRESSURE: 140 MMHG | WEIGHT: 165.4 LBS | HEIGHT: 66 IN | BODY MASS INDEX: 26.58 KG/M2 | OXYGEN SATURATION: 94 %

## 2023-06-30 DIAGNOSIS — Z98.89 OTHER SPECIFIED POSTPROCEDURAL STATES: Chronic | ICD-10-CM

## 2023-06-30 DIAGNOSIS — Z98.890 OTHER SPECIFIED POSTPROCEDURAL STATES: Chronic | ICD-10-CM

## 2023-06-30 DIAGNOSIS — Z95.5 PRESENCE OF CORONARY ANGIOPLASTY IMPLANT AND GRAFT: Chronic | ICD-10-CM

## 2023-06-30 DIAGNOSIS — Z98.1 ARTHRODESIS STATUS: Chronic | ICD-10-CM

## 2023-06-30 DIAGNOSIS — I35.0 NONRHEUMATIC AORTIC (VALVE) STENOSIS: ICD-10-CM

## 2023-06-30 PROCEDURE — 93306 TTE W/DOPPLER COMPLETE: CPT

## 2023-06-30 PROCEDURE — 99024 POSTOP FOLLOW-UP VISIT: CPT

## 2023-06-30 PROCEDURE — 71046 X-RAY EXAM CHEST 2 VIEWS: CPT | Mod: 26

## 2023-06-30 PROCEDURE — 93306 TTE W/DOPPLER COMPLETE: CPT | Mod: 26

## 2023-06-30 PROCEDURE — 71046 X-RAY EXAM CHEST 2 VIEWS: CPT

## 2023-06-30 NOTE — PHYSICAL EXAM
[Respiration, Rhythm And Depth] : normal respiratory rhythm and effort [Heart Rate And Rhythm] : heart rate was normal and rhythm regular [Heart Sounds] : normal S1 and S2 [Clean] : clean [Dry] : dry [Healing Well] : healing well [No Edema] : no edema [FreeTextEntry1] : diminished bases bilaterally [Bleeding] : no active bleeding [Foul Odor] : no foul smell [Purulent Drainage] : no purulent drainage [Serosanguinous Drainage] : no serosanguinous drainage [Erythema] : not erythematous [Warm] : not warm [Tender] : not tender

## 2023-06-30 NOTE — ASSESSMENT
[FreeTextEntry1] : The patient is making slow progress status post mitral valve replacement.  At baseline he has impaired mobility.  However he is walking well around the home.  He is reluctant to ambulate outdoors secondary to "poor air quality."  I have encouraged him to slowly start venturing outdoors, and potentially going to his local shopping center to ambulate further distances while indoors.\par \par On exam there are decreased breath sounds at the bilateral bases left greater than right.  There is a left pleural effusion.  There is no lower extremity edema.\par \par I have discussed the case with Dr. Leonardo Llamas.  He consulted and saw the patient.  We will arrange for an outpatient ultrasound-guided left thoracentesis by Dr. Llamas next Wednesday.  The patient will hold his Coumadin Monday and Tuesday evening.\par \par He will then follow-up for additional postoperative care with Dr. Islas\par \par I would like to thank you for referring this patient to my attention and for allowing me to participate in his care.  If there are any further questions, or I can be of any further assistance, please do not hesitate contacting me at any time.

## 2023-06-30 NOTE — ADDENDUM
[FreeTextEntry1] : Patient noted to have pleural effusion on chest xray today. Patient seen, evaluated and examined by Dr. Leonardo Llamas. He has a left pleural effusion and will require thoracentesis. This will be scheduled for next Wednesday in the procedure room at [facility].

## 2023-06-30 NOTE — REASON FOR VISIT
[Spouse] : spouse [de-identified] : mitral valve replacement with #29 Lyles II porcine valve [de-identified] : 6/14/2023 [de-identified] : 68M, PMH CAD s/p PCI of RCA 2/16/2021, HTN, HLD, DM II, hyperthyroidism, hemochromatosis, neck surgery s/p PCDF & ACDF, Hepatitis C virus s/p treatment, known MR initially planned to be evaluated for mitral clip, recent admission in April for syncope secondary to orthostatic hypotension and BELLO in setting of dehydration, found to have strep salivarius / vestilbularis bacteremia, QUINCY at that time with severe MR and inconclusive for endocarditis. Patient was treated with 4 weeks ceftriaxone and had repeat QUINCY 6/12 to assess valve which showed severe MR with multiple mobile echo densities. \par \par Patient underwent MV Replacement (tissue) with Dr. Islas 6/14/23. Postop course significant for acute blood \par loss anemia (stable s/p PRBC transfusions), hypotension (resolved s/p fluid resuscitation), and BELLO (resolved). Pt hemodynamically stable and ambulating well. Pt stable for discharge home as per Dr. Islas on 6/21/2023\par Home INR draws set up, Mon/Thurs to start Thursday 6/22 to be followed by PCP Dr. Hortensia Samuels in [place]. \par \par Pre-discharge TTE: Small to moderate pericardial effusion.\par \par Today the patient reports feeling short of breath and fatigue. His pain is mostly from the prior neck surgery. His incision is only slight pain. Denies swelling in the legs or palpitations. Reports occasional scant drainage lower part of sternal incision\par \par Postoperative chest x-ray performed earlier today shows a moderate left pleural effusion.\par \par Repeat echocardiogram shows a small pericardial effusion.  Upon discharge he had a small to moderate pericardial effusion.\par \par The patient's past medical history, past surgical history, family history, social history, allergies, medications, and multisystem review of systems were individually reviewed with the patient.  There are no pertinent additions or subtractions.  The patient was personally seen and examined.

## 2023-07-03 RX ORDER — FUROSEMIDE 40 MG
1 TABLET ORAL
Qty: 14 | Refills: 0
Start: 2023-07-03 | End: 2023-07-16

## 2023-07-04 ENCOUNTER — TRANSCRIPTION ENCOUNTER (OUTPATIENT)
Age: 68
End: 2023-07-04

## 2023-07-05 ENCOUNTER — APPOINTMENT (OUTPATIENT)
Dept: THORACIC SURGERY | Facility: HOSPITAL | Age: 68
End: 2023-07-05

## 2023-07-05 ENCOUNTER — OUTPATIENT (OUTPATIENT)
Dept: OUTPATIENT SERVICES | Facility: HOSPITAL | Age: 68
LOS: 1 days | End: 2023-07-05
Payer: COMMERCIAL

## 2023-07-05 DIAGNOSIS — Z95.5 PRESENCE OF CORONARY ANGIOPLASTY IMPLANT AND GRAFT: Chronic | ICD-10-CM

## 2023-07-05 DIAGNOSIS — Z98.89 OTHER SPECIFIED POSTPROCEDURAL STATES: Chronic | ICD-10-CM

## 2023-07-05 DIAGNOSIS — Z98.1 ARTHRODESIS STATUS: Chronic | ICD-10-CM

## 2023-07-05 DIAGNOSIS — Z98.890 OTHER SPECIFIED POSTPROCEDURAL STATES: Chronic | ICD-10-CM

## 2023-07-05 DIAGNOSIS — J90 PLEURAL EFFUSION, NOT ELSEWHERE CLASSIFIED: ICD-10-CM

## 2023-07-05 LAB — GLUCOSE BLDC GLUCOMTR-MCNC: 152 MG/DL — HIGH (ref 70–99)

## 2023-07-05 PROCEDURE — 32555 ASPIRATE PLEURA W/ IMAGING: CPT | Mod: LT

## 2023-07-05 PROCEDURE — 71045 X-RAY EXAM CHEST 1 VIEW: CPT | Mod: 26

## 2023-07-05 PROCEDURE — 82962 GLUCOSE BLOOD TEST: CPT

## 2023-07-05 PROCEDURE — 71045 X-RAY EXAM CHEST 1 VIEW: CPT

## 2023-07-05 PROCEDURE — 32555 ASPIRATE PLEURA W/ IMAGING: CPT | Mod: LT,78

## 2023-07-05 PROCEDURE — 32554 ASPIRATE PLEURA W/O IMAGING: CPT | Mod: LT,78

## 2023-07-05 DEVICE — PLEURX CATHETER KIT: Type: IMPLANTABLE DEVICE | Status: FUNCTIONAL

## 2023-07-11 LAB — INR PPP: 1.56

## 2023-07-14 LAB — INR PPP: 2.06

## 2023-07-18 ENCOUNTER — NON-APPOINTMENT (OUTPATIENT)
Age: 68
End: 2023-07-18

## 2023-07-18 ENCOUNTER — OUTPATIENT (OUTPATIENT)
Dept: OUTPATIENT SERVICES | Facility: HOSPITAL | Age: 68
LOS: 1 days | End: 2023-07-18
Payer: COMMERCIAL

## 2023-07-18 ENCOUNTER — RESULT REVIEW (OUTPATIENT)
Age: 68
End: 2023-07-18

## 2023-07-18 ENCOUNTER — APPOINTMENT (OUTPATIENT)
Dept: OPHTHALMOLOGY | Facility: CLINIC | Age: 68
End: 2023-07-18
Payer: MEDICARE

## 2023-07-18 ENCOUNTER — APPOINTMENT (OUTPATIENT)
Dept: CARDIOTHORACIC SURGERY | Facility: CLINIC | Age: 68
End: 2023-07-18
Payer: MEDICARE

## 2023-07-18 VITALS
SYSTOLIC BLOOD PRESSURE: 151 MMHG | DIASTOLIC BLOOD PRESSURE: 99 MMHG | OXYGEN SATURATION: 98 % | RESPIRATION RATE: 16 BRPM | HEART RATE: 100 BPM | HEIGHT: 66 IN | BODY MASS INDEX: 24.27 KG/M2 | WEIGHT: 151 LBS | TEMPERATURE: 98 F

## 2023-07-18 DIAGNOSIS — Z98.89 OTHER SPECIFIED POSTPROCEDURAL STATES: Chronic | ICD-10-CM

## 2023-07-18 DIAGNOSIS — Z95.5 PRESENCE OF CORONARY ANGIOPLASTY IMPLANT AND GRAFT: Chronic | ICD-10-CM

## 2023-07-18 DIAGNOSIS — Z98.890 OTHER SPECIFIED POSTPROCEDURAL STATES: Chronic | ICD-10-CM

## 2023-07-18 DIAGNOSIS — G89.18 OTHER ACUTE POSTPROCEDURAL PAIN: ICD-10-CM

## 2023-07-18 DIAGNOSIS — E11.9 TYPE 2 DIABETES MELLITUS W/OUT COMPLICATIONS: ICD-10-CM

## 2023-07-18 DIAGNOSIS — Z98.1 ARTHRODESIS STATUS: Chronic | ICD-10-CM

## 2023-07-18 DIAGNOSIS — J90 PLEURAL EFFUSION, NOT ELSEWHERE CLASSIFIED: ICD-10-CM

## 2023-07-18 LAB — INR PPP: 2.11

## 2023-07-18 PROCEDURE — 99213 OFFICE O/P EST LOW 20 MIN: CPT

## 2023-07-18 PROCEDURE — 92134 CPTRZ OPH DX IMG PST SGM RTA: CPT

## 2023-07-18 PROCEDURE — 71046 X-RAY EXAM CHEST 2 VIEWS: CPT | Mod: 26

## 2023-07-18 PROCEDURE — 99024 POSTOP FOLLOW-UP VISIT: CPT

## 2023-07-18 PROCEDURE — 71046 X-RAY EXAM CHEST 2 VIEWS: CPT

## 2023-07-18 NOTE — REASON FOR VISIT
[Spouse] : spouse [de-identified] : mitral valve replacement with #29 Lyles II porcine valve [de-identified] : 6/14/23 [de-identified] : 68M, PMH CAD s/p PCI of RCA 2/16/2021, HTN, HLD, DM II, hyperthyroidism, hemochromatosis, neck surgery s/p PCDF & ACDF, Hepatitis C virus s/p treatment, known MR initially planned to be evaluated for mitral clip, recent admission in April for syncope secondary to orthostatic hypotension and BELLO in setting of dehydration, found to have strep salivarius / vestilbularis bacteremia, QUINCY at that time with severe MR and inconclusive for endocarditis. Patient was treated with 4 weeks ceftriaxone and had repeat QUINCY 6/12 to assess valve which showed severe MR with multiple mobile echo densities. \par \par Patient underwent MV Replacement (tissue) with Dr. Islas 6/14/23. Postop course significant for acute blood \par loss anemia (stable s/p PRBC transfusions), hypotension (resolved s/p fluid resuscitation), and BELLO (resolved). Pt hemodynamically stable and ambulating well. Pt stable for discharge home as per Dr. Islas on 6/21/2023\par Home INR draws set up, Mon/Thurs to start Thursday 6/22 to be followed by PCP Dr. Hortensia Samuels in [place]. \par \par Pre-discharge TTE: Small to moderate pericardial effusion.\par \par He presented postoperatively to the office with shortness of breath and was found to have a left side pleural effusion which was drained by Dr. Llamas for 800 cc 7/5/2023. He reports feeling so much better. He walks frequently. Patient denies chest pain, palpitations, shortness of breath, cough, fevers, chills, fatigue and unintentional weight loss or gain.

## 2023-07-18 NOTE — ASSESSMENT
[FreeTextEntry1] : I had the pleasure of seeing Mr. LYDIA PEACE  in the office today.\par \par Briefly, Mr. PEACE is an 68 year old male who is status post mitral valve replacement on 6/14/2023. The postoperative course was notable for anemia, hypotension, left pleural effusion. Since discharge, Mr. PEACE has been slowly improving and at this time primarily complains of incisional pain radiating to neck.\par \par During the visit, we discussed the importance of increasing activity and exercise as tolerated. We discussed the need for antibiotic prophylaxis with procedures such as dental work and colonoscopy or endoscopy. We recommend waiting a full six months before undergoing any dental procedure or cleaning.\par \par Overall, I am happy with Mr. PEACE's progress. All questions were answered and concerns were addressed. I encouraged the patient to call the office with any other concerns.\par \par In summary:\par - Continue current medications, continue diuretics\par - May start driving short distances\par - Pain medication continued until can make appt with pain management doctor\par - Continue incentive spirometry\par - Increase activity as tolerated\par - Eat a heart healthy diet\par - No further need to follow up in our office unless specific surgical concern\par - Follow up with cardiologist, Dr. Desuoza 8/7\par

## 2023-07-18 NOTE — CONSULT LETTER
[Dear  ___] : Dear  [unfilled], [Courtesy Letter:] : I had the pleasure of seeing your patient, [unfilled], in my office today. [Consult Closing:] : Thank you very much for allowing me to participate in the care of this patient.  If you have any questions, please do not hesitate to contact me. [Please see my note below.] : Please see my note below. [Sincerely,] : Sincerely, [FreeTextEntry2] : Aadm Desouza MD [FreeTextEntry3] : Wellington Islas MD\par  of Cardiothoracic Surgery\par [facility]\par [address] \[place] [address]\par [phone]\par

## 2023-07-18 NOTE — PHYSICAL EXAM
[] : no respiratory distress [Respiration, Rhythm And Depth] : normal respiratory rhythm and effort [Auscultation Breath Sounds / Voice Sounds] : lungs were clear to auscultation bilaterally [Apical Impulse] : the apical impulse was normal [Heart Rate And Rhythm] : heart rate was normal and rhythm regular [Heart Sounds] : normal S1 and S2 [Clean] : clean [Dry] : dry [Healing Well] : healing well [No Edema] : no edema

## 2023-07-20 ENCOUNTER — APPOINTMENT (OUTPATIENT)
Dept: THORACIC SURGERY | Facility: CLINIC | Age: 68
End: 2023-07-20

## 2023-07-21 ENCOUNTER — TRANSCRIPTION ENCOUNTER (OUTPATIENT)
Age: 68
End: 2023-07-21

## 2023-07-21 ENCOUNTER — APPOINTMENT (OUTPATIENT)
Dept: CARDIOLOGY | Facility: CLINIC | Age: 68
End: 2023-07-21
Payer: MEDICARE

## 2023-07-21 LAB — INR PPP: 2.1

## 2023-07-21 PROCEDURE — 93793 ANTICOAG MGMT PT WARFARIN: CPT

## 2023-07-21 PROCEDURE — 85610 PROTHROMBIN TIME: CPT | Mod: QW

## 2023-07-26 LAB — INR PPP: 2.21

## 2023-07-28 LAB — INR PPP: 2.43

## 2023-08-01 ENCOUNTER — APPOINTMENT (OUTPATIENT)
Dept: GASTROENTEROLOGY | Facility: CLINIC | Age: 68
End: 2023-08-01
Payer: MEDICARE

## 2023-08-01 DIAGNOSIS — K76.9 UNSPECIFIED CIRRHOSIS OF LIVER: ICD-10-CM

## 2023-08-01 DIAGNOSIS — K74.60 UNSPECIFIED CIRRHOSIS OF LIVER: ICD-10-CM

## 2023-08-01 PROCEDURE — 91200 LIVER ELASTOGRAPHY: CPT

## 2023-08-02 LAB — INR PPP: 2.06

## 2023-08-04 NOTE — ASSESSMENT
[FreeTextEntry1] : FibroScan/ Vibration Controlled Transient Elastography (VCTE) Report  PROBE SIZE: M  INDICATION: NAFLD/MORENO  REFERRING PHYSICIAN: Dr. Jyoti Saravia    PROCEDURE:  Patient was NPO for 4 hours prior to procedure. After providing oral explanations of the procedure to the patient, patient was placed in supine position with right arm in maximum abduction to allow optimal exposure of right lateral abdomen. Patient abdomen was briefly assessed to identify to the terminus of the xyphoid process. The ideal target testing spot was located mid-line and lateral to this point. To acquire proper signals, the skin to liver capsule distance was accessed with the FibroScan probe. Patient was instructed to breathe normally and to abstain from sudden movements during the procedure. Ten shear waves were produced and measurements of the speed of each wave evaluated. Patient tolerated the procedure well and was discharged without incident.    FINDINGS:  - Median shear wave speed of 4.94 meters/second.  - This corresponds to a median Liver Stiffness Score of 73.1 kPa.  - IQR/med 6%  -  dB/m   RESULTS:  FIBROSIS: F4 Fibrosis  STEATOSIS: S0 - Stage (0-10% steatosis)   The results regarding fibrosis stage and/or steatosis grade is based on current published scientific literature and the provided patients diagnosis. Any further medical or surgical intervention should be made by considering the overall medical condition of the patient.

## 2023-08-07 ENCOUNTER — NON-APPOINTMENT (OUTPATIENT)
Age: 68
End: 2023-08-07

## 2023-08-07 ENCOUNTER — APPOINTMENT (OUTPATIENT)
Dept: CARDIOLOGY | Facility: CLINIC | Age: 68
End: 2023-08-07
Payer: MEDICARE

## 2023-08-07 VITALS
WEIGHT: 155 LBS | HEIGHT: 66 IN | OXYGEN SATURATION: 95 % | SYSTOLIC BLOOD PRESSURE: 144 MMHG | DIASTOLIC BLOOD PRESSURE: 82 MMHG | BODY MASS INDEX: 24.91 KG/M2 | HEART RATE: 98 BPM

## 2023-08-07 DIAGNOSIS — B19.20 OTHER CIRRHOSIS OF LIVER: ICD-10-CM

## 2023-08-07 DIAGNOSIS — K74.69 OTHER CIRRHOSIS OF LIVER: ICD-10-CM

## 2023-08-07 PROCEDURE — 99214 OFFICE O/P EST MOD 30 MIN: CPT

## 2023-08-07 PROCEDURE — 93000 ELECTROCARDIOGRAM COMPLETE: CPT

## 2023-08-07 RX ORDER — FUROSEMIDE 40 MG/1
40 TABLET ORAL DAILY
Qty: 14 | Refills: 0 | Status: DISCONTINUED | COMMUNITY
Start: 2023-06-21 | End: 2023-08-07

## 2023-08-09 LAB — INR PPP: 1.88

## 2023-08-15 LAB — INR PPP: 2.44

## 2023-08-22 LAB — INR PPP: 2.61

## 2023-08-29 LAB — INR PPP: 2.46

## 2023-09-06 LAB — INR PPP: 2.37

## 2023-09-12 LAB — INR PPP: 3

## 2023-09-19 LAB — INR PPP: 2.62

## 2023-09-26 LAB — INR PPP: 2.56

## 2023-10-03 LAB — INR PPP: 2.52

## 2023-10-09 NOTE — ED CDU PROVIDER SUBSEQUENT DAY NOTE - TOBACCO USE
Never smoker Notification Instructions: Patient will be notified of pathology results. However, patient instructed to call the office if not contacted within 2 weeks.

## 2023-10-10 LAB — INR PPP: 2.97

## 2023-10-17 RX ORDER — WARFARIN 2.5 MG/1
2.5 TABLET ORAL
Qty: 135 | Refills: 1 | Status: DISCONTINUED | COMMUNITY
Start: 2023-06-21 | End: 2023-10-17

## 2023-10-27 ENCOUNTER — APPOINTMENT (OUTPATIENT)
Dept: ULTRASOUND IMAGING | Facility: CLINIC | Age: 68
End: 2023-10-27
Payer: MEDICARE

## 2023-10-27 ENCOUNTER — APPOINTMENT (OUTPATIENT)
Dept: RADIOLOGY | Facility: CLINIC | Age: 68
End: 2023-10-27
Payer: MEDICARE

## 2023-10-27 PROCEDURE — 76700 US EXAM ABDOM COMPLETE: CPT

## 2023-10-27 PROCEDURE — 71046 X-RAY EXAM CHEST 2 VIEWS: CPT

## 2023-11-07 ENCOUNTER — NON-APPOINTMENT (OUTPATIENT)
Age: 68
End: 2023-11-07

## 2023-11-07 ENCOUNTER — APPOINTMENT (OUTPATIENT)
Dept: OPHTHALMOLOGY | Facility: CLINIC | Age: 68
End: 2023-11-07
Payer: MEDICARE

## 2023-11-07 PROCEDURE — 92014 COMPRE OPH EXAM EST PT 1/>: CPT

## 2023-11-07 PROCEDURE — 92134 CPTRZ OPH DX IMG PST SGM RTA: CPT

## 2023-11-20 ENCOUNTER — APPOINTMENT (OUTPATIENT)
Dept: CARDIOLOGY | Facility: CLINIC | Age: 68
End: 2023-11-20
Payer: MEDICARE

## 2023-11-20 VITALS
HEIGHT: 66 IN | OXYGEN SATURATION: 100 % | DIASTOLIC BLOOD PRESSURE: 80 MMHG | BODY MASS INDEX: 26.36 KG/M2 | WEIGHT: 164 LBS | SYSTOLIC BLOOD PRESSURE: 122 MMHG | HEART RATE: 80 BPM

## 2023-11-20 DIAGNOSIS — Z95.2 PRESENCE OF PROSTHETIC HEART VALVE: ICD-10-CM

## 2023-11-20 PROCEDURE — 99214 OFFICE O/P EST MOD 30 MIN: CPT

## 2023-11-20 RX ORDER — DULAGLUTIDE 1.5 MG/.5ML
1.5 INJECTION, SOLUTION SUBCUTANEOUS
Qty: 6 | Refills: 0 | Status: DISCONTINUED | COMMUNITY
Start: 2021-01-06 | End: 2023-11-20

## 2023-11-20 RX ORDER — POTASSIUM CHLORIDE 750 MG/1
10 CAPSULE, EXTENDED RELEASE ORAL TWICE DAILY
Qty: 180 | Refills: 3 | Status: DISCONTINUED | COMMUNITY
Start: 2023-06-23 | End: 2023-11-20

## 2023-11-20 RX ORDER — OXYCODONE 5 MG/1
5 TABLET ORAL
Qty: 30 | Refills: 0 | Status: DISCONTINUED | COMMUNITY
Start: 2023-07-18 | End: 2023-11-20

## 2023-11-20 RX ORDER — SEMAGLUTIDE 1.34 MG/ML
2 INJECTION, SOLUTION SUBCUTANEOUS
Refills: 0 | Status: ACTIVE | COMMUNITY

## 2023-11-30 ENCOUNTER — LABORATORY RESULT (OUTPATIENT)
Age: 68
End: 2023-11-30

## 2024-02-06 ENCOUNTER — APPOINTMENT (OUTPATIENT)
Dept: OPHTHALMOLOGY | Facility: CLINIC | Age: 69
End: 2024-02-06
Payer: MEDICARE

## 2024-02-06 ENCOUNTER — NON-APPOINTMENT (OUTPATIENT)
Age: 69
End: 2024-02-06

## 2024-02-06 PROCEDURE — 92134 CPTRZ OPH DX IMG PST SGM RTA: CPT

## 2024-02-06 PROCEDURE — 92012 INTRM OPH EXAM EST PATIENT: CPT

## 2024-03-19 ENCOUNTER — APPOINTMENT (OUTPATIENT)
Dept: ULTRASOUND IMAGING | Facility: CLINIC | Age: 69
End: 2024-03-19
Payer: MEDICARE

## 2024-03-19 PROCEDURE — 76700 US EXAM ABDOM COMPLETE: CPT

## 2024-05-20 ENCOUNTER — NON-APPOINTMENT (OUTPATIENT)
Age: 69
End: 2024-05-20

## 2024-05-20 ENCOUNTER — APPOINTMENT (OUTPATIENT)
Dept: CARDIOLOGY | Facility: CLINIC | Age: 69
End: 2024-05-20
Payer: MEDICARE

## 2024-05-20 VITALS
BODY MASS INDEX: 26.47 KG/M2 | SYSTOLIC BLOOD PRESSURE: 122 MMHG | DIASTOLIC BLOOD PRESSURE: 80 MMHG | WEIGHT: 164 LBS | HEART RATE: 86 BPM

## 2024-05-20 DIAGNOSIS — I25.10 ATHEROSCLEROTIC HEART DISEASE OF NATIVE CORONARY ARTERY W/OUT ANGINA PECTORIS: ICD-10-CM

## 2024-05-20 DIAGNOSIS — I10 ESSENTIAL (PRIMARY) HYPERTENSION: ICD-10-CM

## 2024-05-20 DIAGNOSIS — B95.5 BACTEREMIA: ICD-10-CM

## 2024-05-20 DIAGNOSIS — I34.0 NONRHEUMATIC MITRAL (VALVE) INSUFFICIENCY: ICD-10-CM

## 2024-05-20 DIAGNOSIS — R78.81 BACTEREMIA: ICD-10-CM

## 2024-05-20 DIAGNOSIS — I35.0 NONRHEUMATIC AORTIC (VALVE) STENOSIS: ICD-10-CM

## 2024-05-20 PROCEDURE — 93000 ELECTROCARDIOGRAM COMPLETE: CPT

## 2024-05-20 PROCEDURE — G2211 COMPLEX E/M VISIT ADD ON: CPT

## 2024-05-20 PROCEDURE — 99214 OFFICE O/P EST MOD 30 MIN: CPT

## 2024-05-20 RX ORDER — AMOXICILLIN 500 MG/1
500 TABLET, FILM COATED ORAL
Qty: 4 | Refills: 11 | Status: ACTIVE | COMMUNITY
Start: 2024-05-20 | End: 1900-01-01

## 2024-05-20 NOTE — DISCUSSION/SUMMARY
[Patient] : the patient [With Me] : with me [___ Month(s)] : in [unfilled] month(s) [FreeTextEntry1] : 69M w/ PMH as above presenting for follow up.  S/P MVR and doing well. He is low risk for dental work and may proceed without further workup.  He will require SBE ppx - Amoxicillin sent to pharmacy.  1. Severe MR - s/p bMVR  Continue ASA 81 mg PO daily.  2. HTN -well-controlled, continue toprol 100 mg PO daily.   3. Continue healthy diet and exercise 4. HLD -continue atorvastatin 80 mg PO QHS 5. CAD - s/p PCI, CCS class 0. Continue aspirin 81 mg p.o. daily  RTC 6 months [EKG obtained to assist in diagnosis and management of assessed problem(s)] : EKG obtained to assist in diagnosis and management of assessed problem(s)

## 2024-05-20 NOTE — HISTORY OF PRESENT ILLNESS
[FreeTextEntry1] : 69M w/ PMH of HTN, HLD, DM on insulin, HCV s/p treatment, AS with long-term history of murmur presenting to establish care with our practice.  He is active at the gym and is able to use the elipitical and bike for up to an hour at a time without chest pain, SOB or palpitations.  He has previously had a non-invasive workup through [facility] but has switched insurance.    TTE revealed severe eccentric MR and a possible BAV.  LVIDs < 4.0 and EF was 60% by visual estimation. QUINCY revealed severe eccentric MR with a partially flail PMVL. He continues to be asymptomatic and exercise regularly at the gym.  5/20/2024: Feeling well since our last visit.  Denies chest pains, SOB and LE edema.  Trying to remain active at the gym.  Upcoming dental work planned.

## 2024-05-20 NOTE — PHYSICAL EXAM

## 2024-05-20 NOTE — CARDIOLOGY SUMMARY
[de-identified] : 5/9/2022: Normal sinus rhythm, normal 5/15/2023: NSR, low voltage 5/20/2024: SR, LAE, LAFB [de-identified] : QUINCY 3/26/21: severe, eccentric MR, flail P2 [de-identified] : 2/16/21: mLAD 50%, mRCA 80% s/p PCI [de-identified] : 2023: 29 mm MVR

## 2024-05-20 NOTE — REASON FOR VISIT
[Structural Heart and Valve Disease] : structural heart and valve disease [Hyperlipidemia] : hyperlipidemia [Hypertension] : hypertension [Coronary Artery Disease] : coronary artery disease [Spouse] : spouse [FreeTextEntry3] : Dr. Samuels

## 2024-06-04 ENCOUNTER — NON-APPOINTMENT (OUTPATIENT)
Age: 69
End: 2024-06-04

## 2024-06-04 ENCOUNTER — APPOINTMENT (OUTPATIENT)
Dept: OPHTHALMOLOGY | Facility: CLINIC | Age: 69
End: 2024-06-04
Payer: MEDICARE

## 2024-06-04 PROCEDURE — 92134 CPTRZ OPH DX IMG PST SGM RTA: CPT

## 2024-06-04 PROCEDURE — 99213 OFFICE O/P EST LOW 20 MIN: CPT

## 2024-08-06 ENCOUNTER — APPOINTMENT (OUTPATIENT)
Dept: OPHTHALMOLOGY | Facility: CLINIC | Age: 69
End: 2024-08-06

## 2024-08-06 ENCOUNTER — NON-APPOINTMENT (OUTPATIENT)
Age: 69
End: 2024-08-06

## 2024-08-06 PROCEDURE — 92134 CPTRZ OPH DX IMG PST SGM RTA: CPT

## 2024-08-06 PROCEDURE — 99213 OFFICE O/P EST LOW 20 MIN: CPT

## 2024-08-09 ENCOUNTER — RESULT REVIEW (OUTPATIENT)
Age: 69
End: 2024-08-09

## 2024-08-15 ENCOUNTER — RESULT REVIEW (OUTPATIENT)
Age: 69
End: 2024-08-15

## 2024-08-15 ENCOUNTER — APPOINTMENT (OUTPATIENT)
Dept: ULTRASOUND IMAGING | Facility: CLINIC | Age: 69
End: 2024-08-15
Payer: MEDICARE

## 2024-08-15 PROCEDURE — 76700 US EXAM ABDOM COMPLETE: CPT

## 2024-11-18 ENCOUNTER — NON-APPOINTMENT (OUTPATIENT)
Age: 69
End: 2024-11-18

## 2024-11-18 ENCOUNTER — APPOINTMENT (OUTPATIENT)
Dept: CARDIOLOGY | Facility: CLINIC | Age: 69
End: 2024-11-18
Payer: MEDICARE

## 2024-11-18 VITALS
HEIGHT: 66 IN | HEART RATE: 93 BPM | OXYGEN SATURATION: 99 % | WEIGHT: 169 LBS | BODY MASS INDEX: 27.16 KG/M2 | DIASTOLIC BLOOD PRESSURE: 80 MMHG | SYSTOLIC BLOOD PRESSURE: 118 MMHG

## 2024-11-18 DIAGNOSIS — Z95.2 PRESENCE OF PROSTHETIC HEART VALVE: ICD-10-CM

## 2024-11-18 DIAGNOSIS — I25.10 ATHEROSCLEROTIC HEART DISEASE OF NATIVE CORONARY ARTERY W/OUT ANGINA PECTORIS: ICD-10-CM

## 2024-11-18 DIAGNOSIS — I34.0 NONRHEUMATIC MITRAL (VALVE) INSUFFICIENCY: ICD-10-CM

## 2024-11-18 DIAGNOSIS — I10 ESSENTIAL (PRIMARY) HYPERTENSION: ICD-10-CM

## 2024-11-18 DIAGNOSIS — I35.0 NONRHEUMATIC AORTIC (VALVE) STENOSIS: ICD-10-CM

## 2024-11-18 LAB — HBA1C MFR BLD HPLC: 6.7

## 2024-11-18 PROCEDURE — G2211 COMPLEX E/M VISIT ADD ON: CPT

## 2024-11-18 PROCEDURE — 99214 OFFICE O/P EST MOD 30 MIN: CPT

## 2024-11-18 PROCEDURE — 93306 TTE W/DOPPLER COMPLETE: CPT

## 2024-11-18 PROCEDURE — 93000 ELECTROCARDIOGRAM COMPLETE: CPT

## 2024-12-16 ENCOUNTER — APPOINTMENT (OUTPATIENT)
Dept: ULTRASOUND IMAGING | Facility: CLINIC | Age: 69
End: 2024-12-16
Payer: MEDICARE

## 2024-12-16 PROCEDURE — 76700 US EXAM ABDOM COMPLETE: CPT

## 2025-01-29 NOTE — DISCHARGE NOTE PROVIDER - PROVIDER TOKENS
Physical Therapy Treatment    Patient Name:  Solomon Terrazas   MRN:  [id number]    Recommendations:     Discharge Recommendations: Moderate Intensity Therapy  Discharge Equipment Recommendations: to be determined by next level of care  Barriers to discharge: Decreased caregiver support and poor cognition, increased assistance needed    Assessment:     Solomon Terrazas is a 76 y.o. male admitted with a medical diagnosis of Syncope and collapse.  He presents with the following impairments/functional limitations: weakness, impaired endurance, impaired self care skills, impaired functional mobility, gait instability, impaired balance, impaired cognition, decreased coordination, decreased upper extremity function, decreased safety awareness, decreased ROM, impaired coordination, impaired skin. Pt able to complete 4 sit to stand trials with RW requiring  min A with bed height elevated. Also, able to take ~5-6 side steps with RW requiring min A with assistance to advance RW and constant verbal cueing to continue with stepping. BP's taken throughout- in supine prior to movement 98/61 map 72 HR 69, in sitting at EOB 94/54 map 69 HR 69, in standing 96/60 map 72 HR 81, after session with HOB elevated 118/62 map HR 71. Would benefit from continued PT services to increase pt's out of bed therapeutic activities and exercises.    Rehab Prognosis: Good and Fair; patient would benefit from acute skilled PT services to address these deficits and reach maximum level of function.    Recent Surgery: * No surgery found *      Plan:     During this hospitalization, patient to be seen 4 x/week to address the identified rehab impairments via gait training, therapeutic activities, therapeutic exercises, neuromuscular re-education and progress toward the following goals:    Plan of Care Expires:  02/26/25    Subjective     Chief Complaint: None Expressed  Patient/Family Comments/goals: Pt willingly to work with therapy.   Pain/Comfort:          Objective:     Communicated with nurse prior to session.  Patient found HOB elevated with bed alarm, PureWick, telemetry upon PT entry to room.     General Precautions: Standard, fall  Orthopedic Precautions: N/A  Braces: N/A  Respiratory Status: Room air     Functional Mobility:  Bed Mobility:     Rolling Left:  contact guard assistance and minimum assistance  Rolling Right: contact guard assistance and minimum assistance  Scooting: contact guard assistance and increased time to reach EOB  Supine to Sit: minimum assistance  Sit to Supine: minimum assistance and assistance to advance BLE's  Transfers:     Sit to Stand:  minimum assistance and bed height elevated with rolling walker  Gait: ~5-6 side steps with RW requiring min A plus assistance to advance RW      AM-PAC 6 CLICK MOBILITY  Turning over in bed (including adjusting bedclothes, sheets and blankets)?: 3  Sitting down on and standing up from a chair with arms (e.g., wheelchair, bedside commode, etc.): 3  Moving from lying on back to sitting on the side of the bed?: 3  Moving to and from a bed to a chair (including a wheelchair)?: 3  Need to walk in hospital room?: 3  Climbing 3-5 steps with a railing?: 1  Basic Mobility Total Score: 16       Treatment & Education:  Pt able to perform 4 sit to stand transfer trials with RW requiring min A with bed height elevated.  Initially(stands trials 1 and 2) pt with posterior lean onto EOB however, with weight shifting posteriorly/center/anteriorly pt able to bring self over base of support. Instructed in and performed 1 x 10 reps BLE's of hip/knee flexion(marches) within RW boundaries requiring min/CGA. Pt with a decreased ability to lift feet from floor. Able to take ~5-6 side steps with RW requiring min A plus assistance to advance RW. BP's taken throughput session please see assessment note above for all readings. Pt is pleasant and cooperative with all therapy requests.    Patient left HOB elevated with all  lines intact, call button in reach, bed alarm on, and nurse notified..    GOALS:   Multidisciplinary Problems       Physical Therapy Goals          Problem: Physical Therapy    Goal Priority Disciplines Outcome Interventions   Physical Therapy Goal     PT, PT/OT Progressing    Description: Goals to be met by: 25     Patient will increase functional independence with mobility by performin. Supine to sit with Modified [place]  2. Sit to supine with Modified [place]  3. Sit to stand transfer with Supervision/Mod I  4. Gait  x 100 feet with Supervision/ Mod I using LRAD.                          DME Justifications:  No DME recommended requiring DME justifications    Time Tracking:     PT Received On: 25  PT Start Time: 1002     PT Stop Time: 1037  PT Total Time (min): 35 min     Billable Minutes: Gait Training 8, Therapeutic Activity 17, and Neuromuscular Re-education 10    Treatment Type: Treatment  PT/PTA: PTA     Number of PTA visits since last PT visit: 2025   PROVIDER:[TOKEN:[[id number]]],PROVIDER:[TOKEN:[[id number]]]

## 2025-02-04 ENCOUNTER — NON-APPOINTMENT (OUTPATIENT)
Age: 70
End: 2025-02-04

## 2025-02-04 ENCOUNTER — APPOINTMENT (OUTPATIENT)
Dept: OPHTHALMOLOGY | Facility: CLINIC | Age: 70
End: 2025-02-04
Payer: MEDICARE

## 2025-02-04 PROCEDURE — 92014 COMPRE OPH EXAM EST PT 1/>: CPT

## 2025-02-04 PROCEDURE — 92134 CPTRZ OPH DX IMG PST SGM RTA: CPT

## 2025-03-10 ENCOUNTER — APPOINTMENT (OUTPATIENT)
Dept: ULTRASOUND IMAGING | Facility: CLINIC | Age: 70
End: 2025-03-10
Payer: MEDICARE

## 2025-03-10 PROCEDURE — 76536 US EXAM OF HEAD AND NECK: CPT

## 2025-03-18 ENCOUNTER — APPOINTMENT (OUTPATIENT)
Dept: PODIATRY | Facility: CLINIC | Age: 70
End: 2025-03-18
Payer: MEDICARE

## 2025-03-18 VITALS — WEIGHT: 165 LBS | HEIGHT: 64 IN | BODY MASS INDEX: 28.17 KG/M2

## 2025-03-18 PROCEDURE — 99204 OFFICE O/P NEW MOD 45 MIN: CPT | Mod: 25

## 2025-03-18 PROCEDURE — 11721 DEBRIDE NAIL 6 OR MORE: CPT

## 2025-05-02 NOTE — PROGRESS NOTE ADULT - WEIGHT IN LBS
-- DO NOT REPLY / DO NOT REPLY ALL --  -- This inbox is not monitored. If this was sent to the wrong provider or department, reroute message to P ECO Reroute pool. --  -- Message is from Engagement Center Operations (ECO) --  Reason for Appointment Message: [employer] (KB) instructs ECO not to schedule    Reason for Visit: Follow up in one year    Is the patient currently scheduled? No    Preferred time to be seen: Morning at the [address] [place] [address] location    Caller Information       Contact Date/Time Type Contact Phone/Fax    05/02/2025 04:04 PM CDT Phone (Incoming) Dolores Montgomery [phone]            Alternative phone number: NA    Can a detailed message be left?  Yes - Voicemail   Patient has been advised the message will be addressed within 2-3 business days         Copied from CRM #[id number]. Topic: MW Schedule Appointment  >> May 2, 2025  4:05 PM Helen WEBER wrote:  Dolores Montgomery called to schedule, cancel or reschedule a Primary Care or Specialty Clinician visit.   Unable to Schedule, reschedule, or cancel, Sent message - .   [phone]

## 2025-05-05 ENCOUNTER — APPOINTMENT (OUTPATIENT)
Dept: CARDIOLOGY | Facility: CLINIC | Age: 70
End: 2025-05-05
Payer: MEDICARE

## 2025-05-05 VITALS
HEIGHT: 64 IN | WEIGHT: 176 LBS | BODY MASS INDEX: 30.05 KG/M2 | OXYGEN SATURATION: 99 % | DIASTOLIC BLOOD PRESSURE: 68 MMHG | SYSTOLIC BLOOD PRESSURE: 130 MMHG | HEART RATE: 80 BPM

## 2025-05-05 DIAGNOSIS — I25.10 ATHEROSCLEROTIC HEART DISEASE OF NATIVE CORONARY ARTERY W/OUT ANGINA PECTORIS: ICD-10-CM

## 2025-05-05 DIAGNOSIS — I35.0 NONRHEUMATIC AORTIC (VALVE) STENOSIS: ICD-10-CM

## 2025-05-05 DIAGNOSIS — I10 ESSENTIAL (PRIMARY) HYPERTENSION: ICD-10-CM

## 2025-05-05 DIAGNOSIS — Z95.2 PRESENCE OF PROSTHETIC HEART VALVE: ICD-10-CM

## 2025-05-05 DIAGNOSIS — I34.0 NONRHEUMATIC MITRAL (VALVE) INSUFFICIENCY: ICD-10-CM

## 2025-05-05 PROCEDURE — G2211 COMPLEX E/M VISIT ADD ON: CPT

## 2025-05-05 PROCEDURE — 93000 ELECTROCARDIOGRAM COMPLETE: CPT

## 2025-05-05 PROCEDURE — 99214 OFFICE O/P EST MOD 30 MIN: CPT

## 2025-05-05 RX ORDER — INSULIN ASPART 100 [IU]/ML
100 INJECTION, SOLUTION INTRAVENOUS; SUBCUTANEOUS
Refills: 0 | Status: ACTIVE | COMMUNITY

## 2025-05-27 ENCOUNTER — APPOINTMENT (OUTPATIENT)
Dept: PODIATRY | Facility: CLINIC | Age: 70
End: 2025-05-27
Payer: MEDICARE

## 2025-05-27 ENCOUNTER — NON-APPOINTMENT (OUTPATIENT)
Age: 70
End: 2025-05-27

## 2025-05-27 PROCEDURE — 11721 DEBRIDE NAIL 6 OR MORE: CPT

## 2025-08-07 ENCOUNTER — APPOINTMENT (OUTPATIENT)
Dept: PODIATRY | Facility: CLINIC | Age: 70
End: 2025-08-07
Payer: MEDICARE

## 2025-08-07 PROCEDURE — 11721 DEBRIDE NAIL 6 OR MORE: CPT

## (undated) DEVICE — Device

## (undated) DEVICE — DRAPE TOWEL BLUE 17" X 24"

## (undated) DEVICE — SOL ANTI FOG

## (undated) DEVICE — MARKING PEN W RULER

## (undated) DEVICE — GLV 8 PROTEXIS (WHITE)

## (undated) DEVICE — GLV 7.5 PROTEXIS (WHITE)

## (undated) DEVICE — BLADE SCALPEL SAFETYLOCK #15

## (undated) DEVICE — SUT PROLENE 5-0 36" RB-1

## (undated) DEVICE — SUT PROLENE 7-0 30" BV-1

## (undated) DEVICE — PRESSURE INFUSOR BAG 500ML

## (undated) DEVICE — PACING CABLE A/V TEMP SCREW DOWN 6FT

## (undated) DEVICE — CONNECTOR "Y" 3/8 X 3/8 X 3/8"

## (undated) DEVICE — MULTIPLE PERFUSION SET FEMALE 1 INLET LEG W 4 LEGS 15" (BLUE/RED)

## (undated) DEVICE — SUT VICRYL 0 54" REEL UNDYED

## (undated) DEVICE — NDL COUNTER FOAM AND MAGNET 40-70

## (undated) DEVICE — CONNECTOR STRAIGHT 3/8 X 1/2"

## (undated) DEVICE — SUT ETHIBOND 2-0 30" SH-1 GREEN/WHITE

## (undated) DEVICE — PREP CHLORAPREP HI-LITE ORANGE 26ML

## (undated) DEVICE — ELCTR BOVIE BLADE 3/4" EXTENDED LENGTH 6"

## (undated) DEVICE — GLV 6.5 PROTEXIS (WHITE)

## (undated) DEVICE — SUT PROLENE 5-0 30" RB-2

## (undated) DEVICE — NDL HYPO SAFE 18G X 1.5" (PINK)

## (undated) DEVICE — SUT PDS II 2-0 27" CT-1

## (undated) DEVICE — GLV 8.5 PROTEXIS (WHITE)

## (undated) DEVICE — DRSG MEPILEX 10 X 30CM (4 X 12") AG

## (undated) DEVICE — GLV 6 PROTEXIS (WHITE)

## (undated) DEVICE — TONGUE DEPRESSOR

## (undated) DEVICE — SUT PROLENE 3-0 36" SH

## (undated) DEVICE — SPONGE PEANUT AUTO COUNT

## (undated) DEVICE — TUBING ATS SUCTION LINE

## (undated) DEVICE — SUT VICRYL 0 36" CTX UNDYED

## (undated) DEVICE — CHEST DRAIN PLEUR-EVAC DRY/WET ADULT-PEDS SINGLE (QUICK)

## (undated) DEVICE — SUT SOFSILK 0 30" C-16

## (undated) DEVICE — SET BLOOD Y-TYPE

## (undated) DEVICE — SUT ETHIBOND 2-0 36" SH

## (undated) DEVICE — DRSG TEGADERM 4X4.75"

## (undated) DEVICE — VESSEL LOOP EXTRA MAXI-BLUE 0.200" X 22"

## (undated) DEVICE — TRAY IRRIGATION SYR BULB 60CC

## (undated) DEVICE — SUT ETHIBOND 2-0 4-30" RB-1 GREEN

## (undated) DEVICE — GOWN TRIMAX LG

## (undated) DEVICE — WARMING BLANKET DUO-THERM HYPER/HYPOTHERM ADULT

## (undated) DEVICE — SUCTION YANKAUER NO CONTROL VENT

## (undated) DEVICE — GLV 7.5 SENSICARE W ALOE

## (undated) DEVICE — PRESSURE INFUSOR BAG 1000ML

## (undated) DEVICE — SUT ETHIBOND 2-0 30" V5 WHITE

## (undated) DEVICE — CONNECTOR "Y" 1/2 X 3/8 X 3/8"

## (undated) DEVICE — URETERAL CATH RED RUBBER 18FR (RED)

## (undated) DEVICE — STOPCOCK 3 WAY W TUBE 35"

## (undated) DEVICE — SUT SILK 2-0 18" SH (POP-OFF)

## (undated) DEVICE — SUT SILK 0 30" TIES

## (undated) DEVICE — MAXI-FLO SUCTION CATHETER KIT 14FR STRAIGHT

## (undated) DEVICE — LAP PAD 18 X 18"

## (undated) DEVICE — WARMING BLANKET LOWER ADULT

## (undated) DEVICE — SYR LUER LOK 20CC

## (undated) DEVICE — DRAPE IOBAN 33" X 23"

## (undated) DEVICE — TUBING TRUWAVE PRESSURE MALE/FEMALE 72"

## (undated) DEVICE — ELCTR BOVIE TIP BLADE MEGADYNE E-Z CLEAN 6.5" (LONG)

## (undated) DEVICE — FRAZIER SUCTION TIP 10FR

## (undated) DEVICE — SUT PROLENE 6-0 30" C-1

## (undated) DEVICE — SUT VICRYL 3-0 27" CT-1

## (undated) DEVICE — SUT VICRYL 2-0 27" CT-1 UNDYED

## (undated) DEVICE — SUT SILK 2 30" MH

## (undated) DEVICE — SUT PROLENE 8-0 24" BV175-6

## (undated) DEVICE — SUT BLUNT SZ 5

## (undated) DEVICE — SOL IRR POUR NS 0.9% 500ML

## (undated) DEVICE — DRAPE INSTRUMENT POUCH 6.75" X 11"

## (undated) DEVICE — ELCTR MULTIFUNCTION DEFIBRILLATION ELECTRODE EDGE SYSTEM ADULT

## (undated) DEVICE — SUT STAINLESS STEEL 5 18" SCC

## (undated) DEVICE — DRSG MEPILEX 10 X 10CM (4 X 4") AG

## (undated) DEVICE — SUT ETHIBOND 4-0 36" RB-1

## (undated) DEVICE — SUT ETHIBOND 2-0 4-30" RB-1 WHITE

## (undated) DEVICE — SUT POLYSORB 3-0 18" P-12 UNDYED

## (undated) DEVICE — STOPCOCK 3 WAY W SWIVEL MALE LUER LOCK

## (undated) DEVICE — PACK MINOR WITH LAP

## (undated) DEVICE — SUT PROLENE 7-0 24" BV175-6

## (undated) DEVICE — DRAIN PLEURX KIT WITH 1000ML VACUUM BOTTLE

## (undated) DEVICE — GLV 6.5 PROTEXIS (BLUE)

## (undated) DEVICE — DRSG TAPE TRANSPORE 3"

## (undated) DEVICE — DRSG MEPILEX 10 X 25CM (4 X 10") AG

## (undated) DEVICE — DRSG OPSITE 2.5 X 2"

## (undated) DEVICE — SUT SILK 5-0 60" TIES

## (undated) DEVICE — DRSG MASTISOL

## (undated) DEVICE — CONNECTOR "Y" 1/2 X 1/2 X 3/8"

## (undated) DEVICE — TOURNIQUET SET TOURNIKWIK 12FR (4 TUBES, 1 SNARE) 7.5"

## (undated) DEVICE — SUT PROLENE 3-0 36" MH

## (undated) DEVICE — TUBING SUCTION 20FT

## (undated) DEVICE — PREP SCRUB BRUSH W CHG 4%

## (undated) DEVICE — SUT PROLENE 4-0 36" RB-1

## (undated) DEVICE — SUT SILK 0 30" KS

## (undated) DEVICE — SUT POLYSORB 4-0 P-12 UNDYED

## (undated) DEVICE — MEDICATION LABELS W MARKER

## (undated) DEVICE — SUT PROLENE 4-0 36" SH

## (undated) DEVICE — SUT ETHIBOND 5 4-30" CCS

## (undated) DEVICE — SUT SOFSILK 4-0 24" CV-15

## (undated) DEVICE — SUT MONOCRYL 4-0 27" PS-2 UNDYED

## (undated) DEVICE — SUT DOUBLE 6 WIRE STERNAL

## (undated) DEVICE — RANGER BLOOD/FLUID WARMING SET DISP

## (undated) DEVICE — SUT SURGIPRO II 0 30" GS-21

## (undated) DEVICE — DRAPE TOWEL WHITE 17" X 24"

## (undated) DEVICE — PHRENIC NERVE PAD MEDIUM

## (undated) DEVICE — BLADE SURGICAL #15 CARBON

## (undated) DEVICE — ELCTR BOVIE TIP BLADE MEGADYNE E-Z CLEAN 2.5" (SHORT)

## (undated) DEVICE — SUT PLEDGET 9MM X 4MM X 1.5MM

## (undated) DEVICE — STAPLER SKIN PROXIMATE

## (undated) DEVICE — VISITEC 4X4

## (undated) DEVICE — DRAPE 3/4 SHEET W REINFORCEMENT 56X77"

## (undated) DEVICE — GLV 7 PROTEXIS (BLUE)

## (undated) DEVICE — DRSG CURITY GAUZE SPONGE 4 X 4" 12-PLY

## (undated) DEVICE — SWITCH ARISS TABLE MOUNT UNEQUAL LEGS 13"

## (undated) DEVICE — DRSG OPSITE 13.75 X 4"

## (undated) DEVICE — PACK VALVE

## (undated) DEVICE — TUBING MEDI-VAC W MAXIGRIP CONNECTORS 1/4"X6'

## (undated) DEVICE — SUT VICRYL 2 27" TP-1 UNDYED

## (undated) DEVICE — GOWN XL W TOWEL

## (undated) DEVICE — GLV 7 PROTEXIS (WHITE)

## (undated) DEVICE — SUT ETHIBOND 3-0 36" RB-1

## (undated) DEVICE — DRAPE SLUSH / WARMER 44 X 66"

## (undated) DEVICE — SAW BLADE STRYKER SAGITTAL SAFEDGE 40.5X47.5X0.64

## (undated) DEVICE — SUT PERMAHAND SILK 2 60" TIES

## (undated) DEVICE — SUT VICRYL 1 36" CTX UNDYED

## (undated) DEVICE — SUT SILK 0 30" SH